# Patient Record
Sex: MALE | Race: WHITE | NOT HISPANIC OR LATINO | Employment: OTHER | URBAN - METROPOLITAN AREA
[De-identification: names, ages, dates, MRNs, and addresses within clinical notes are randomized per-mention and may not be internally consistent; named-entity substitution may affect disease eponyms.]

---

## 2019-09-10 ENCOUNTER — APPOINTMENT (OUTPATIENT)
Dept: RADIOLOGY | Facility: CLINIC | Age: 64
End: 2019-09-10
Payer: OTHER GOVERNMENT

## 2019-09-10 ENCOUNTER — OFFICE VISIT (OUTPATIENT)
Dept: OBGYN CLINIC | Facility: CLINIC | Age: 64
End: 2019-09-10
Payer: COMMERCIAL

## 2019-09-10 VITALS
BODY MASS INDEX: 34.57 KG/M2 | HEART RATE: 80 BPM | HEIGHT: 75 IN | DIASTOLIC BLOOD PRESSURE: 82 MMHG | SYSTOLIC BLOOD PRESSURE: 124 MMHG | WEIGHT: 278 LBS

## 2019-09-10 DIAGNOSIS — M54.2 NECK PAIN: ICD-10-CM

## 2019-09-10 DIAGNOSIS — M51.36 LUMBAR DEGENERATIVE DISC DISEASE: ICD-10-CM

## 2019-09-10 DIAGNOSIS — M46.1 INFLAMMATION OF RIGHT SACROILIAC JOINT (HCC): ICD-10-CM

## 2019-09-10 DIAGNOSIS — M47.812 CERVICAL SPONDYLOSIS WITHOUT MYELOPATHY: Primary | ICD-10-CM

## 2019-09-10 DIAGNOSIS — M54.50 LUMBAR PAIN: ICD-10-CM

## 2019-09-10 PROCEDURE — 72110 X-RAY EXAM L-2 SPINE 4/>VWS: CPT

## 2019-09-10 PROCEDURE — 72050 X-RAY EXAM NECK SPINE 4/5VWS: CPT

## 2019-09-10 PROCEDURE — 99203 OFFICE O/P NEW LOW 30 MIN: CPT | Performed by: ORTHOPAEDIC SURGERY

## 2019-09-10 RX ORDER — NAPROXEN 500 MG/1
500 TABLET ORAL 2 TIMES DAILY WITH MEALS
COMMUNITY
End: 2020-01-16 | Stop reason: SDUPTHER

## 2019-09-10 RX ORDER — CYCLOBENZAPRINE HCL 10 MG
10 TABLET ORAL
COMMUNITY

## 2019-09-10 RX ORDER — DONEPEZIL HYDROCHLORIDE 10 MG/1
10 TABLET, FILM COATED ORAL
COMMUNITY

## 2019-09-10 NOTE — PROGRESS NOTES
Assessment:     1  Cervical spondylosis without myelopathy    2  Lumbar degenerative disc disease    3  Inflammation of right sacroiliac joint (HCC)        Plan:     Problem List Items Addressed This Visit        Musculoskeletal and Integument    Cervical spondylosis without myelopathy - Primary    Relevant Orders    XR spine cervical complete 4 or 5 vw non injury    Ambulatory referral to Physical Therapy    Lumbar degenerative disc disease    Relevant Orders    XR spine lumbar minimum 4 views non injury    Ambulatory referral to Physical Therapy    Inflammation of right sacroiliac joint [facility])    Relevant Orders    Ambulatory referral to Physical Therapy          The patient was seen and examined by Dr Mily Harris and myself  Findings consistent with cervical and lumbar spine spondylosis and right sacroiliitis  Findings and treatment options were discussed with the patient  X-rays reviewed with him  Recommend formal physical therapy for the cervical lumbar spine at this time  Continue naproxen and Flexeril  He will follow up in 6-8 weeks for re-evaluation  Discussed he may knee evaluation with pain management for possible right SI joint injection if there is no improvement  All questions were answered to patient's satisfaction  Subjective:     Patient ID: Valery Menendez is a 60 y o  male  Chief Complaint: This is a 31-year-old white male complaining of cervical and lumbar spine pain for the past 40 years or so  Patient states the pain has been progressively worsening over time  He is having increasing stiffness of his cervical spine  He has occasional pain that radiates down his left upper extremity  He denies any numbness or tingling into his hands  He also has occasional pain radiating from his lumbar spine to his bilateral buttocks  He denies any numbness or tingling down his legs  He denies any bowel or bladder incontinence    He had physical therapy about 5 years ago in [place] that did help at that time  He states he has seen a rheumatologist and tests were negative for rheumatoid arthritis  He receives naproxen and Flexeril from his PCP that provided minimal relief  No other recent treatment  He has had a right total knee replacement and right shoulder replacement within the past 5 years as well  Patient intake form was reviewed today  Allergy:  No Known Allergies  Medications:  all current active meds have been reviewed  Past Medical History:  Past Medical History:   Diagnosis Date    Alzheimer disease     Arthritis      Past Surgical History:  Past Surgical History:   Procedure Laterality Date    CORONARY ANGIOPLASTY WITH STENT PLACEMENT Left     KNEE SURGERY Right 05/2017    replacement    SHOULDER SURGERY Right 11/2017    replacement     Family History:  Family History   Problem Relation Age of Onset    Colon cancer Mother     Dementia Mother     Prostate cancer Father     Heart disease Father      Social History:  Social History     Substance and Sexual Activity   Alcohol Use Yes    Binge frequency: Monthly     Social History     Substance and Sexual Activity   Drug Use Not on file     Social History     Tobacco Use   Smoking Status Former Smoker   Smokeless Tobacco Never Used     Review of Systems   Constitutional: Negative  HENT: Negative  Eyes: Negative  Respiratory: Negative  Cardiovascular: Negative  Gastrointestinal: Negative  Endocrine: Negative  Genitourinary: Negative  Musculoskeletal: Positive for arthralgias and back pain  Skin: Negative  Allergic/Immunologic: Negative  Neurological: Negative  Hematological: Negative  Psychiatric/Behavioral: Negative  Objective:  BP Readings from Last 1 Encounters:   09/10/19 124/82      Wt Readings from Last 1 Encounters:   09/10/19 126 kg (278 lb)      BMI:   Estimated body mass index is 34 75 kg/m² as calculated from the following:    Height as of this encounter: 6' 3" (1 905 m)  Weight as of this encounter: 126 kg (278 lb)  BSA:   Estimated body surface area is 2 52 meters squared as calculated from the following:    Height as of this encounter: 6' 3" (1 905 m)  Weight as of this encounter: 126 kg (278 lb)  Physical Exam   Constitutional: He is oriented to person, place, and time  He appears well-developed  HENT:   Head: Normocephalic and atraumatic  Eyes: Conjunctivae and EOM are normal    Neck: Neck supple  Neurological: He is alert and oriented to person, place, and time  Skin: Skin is warm  Psychiatric: He has a normal mood and affect  Nursing note and vitals reviewed  Back Exam     Tenderness   The patient is experiencing tenderness in the cervical and lumbar (Bilateral cervical paraspinals and trapezius, right SI joint and bilateral lumbar paraspinals)  Muscle Strength   The patient has normal back strength  Tests   Straight leg raise right: negative  Straight leg raise left: negative    Other   Toe walk: normal  Heel walk: normal  Back sensation: Some decreased sensation to light touch around right knee joint near total knee scar  Erythema: no back redness  Scars: absent    Comments:  Limited extension and bilateral lateral rotation of cervical spine with spasm  Limited extension of lumbar spine            I have personally reviewed pertinent films in PACS and my interpretation is X-rays of cervical spine reveal reversal of curve with degenerative disc disease at C5-C6, C6-C7 and facet arthropathy  X-rays lumbar spine reveal diffuse degenerative disc disease with straightening of curve

## 2019-09-24 ENCOUNTER — EVALUATION (OUTPATIENT)
Dept: PHYSICAL THERAPY | Facility: CLINIC | Age: 64
End: 2019-09-24
Payer: OTHER GOVERNMENT

## 2019-09-24 DIAGNOSIS — M47.812 CERVICAL SPONDYLOSIS WITHOUT MYELOPATHY: ICD-10-CM

## 2019-09-24 DIAGNOSIS — M51.36 LUMBAR DEGENERATIVE DISC DISEASE: ICD-10-CM

## 2019-09-24 DIAGNOSIS — M46.1 INFLAMMATION OF RIGHT SACROILIAC JOINT (HCC): ICD-10-CM

## 2019-09-24 PROCEDURE — 97161 PT EVAL LOW COMPLEX 20 MIN: CPT | Performed by: PHYSICAL THERAPIST

## 2019-09-24 PROCEDURE — 97140 MANUAL THERAPY 1/> REGIONS: CPT | Performed by: PHYSICAL THERAPIST
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
## 2019-09-27 ENCOUNTER — OFFICE VISIT (OUTPATIENT)
Dept: PHYSICAL THERAPY | Facility: CLINIC | Age: 64
End: 2019-09-27
Payer: OTHER GOVERNMENT

## 2019-09-27 DIAGNOSIS — M46.1 INFLAMMATION OF RIGHT SACROILIAC JOINT (HCC): ICD-10-CM

## 2019-09-27 DIAGNOSIS — M51.36 LUMBAR DEGENERATIVE DISC DISEASE: ICD-10-CM

## 2019-09-27 DIAGNOSIS — M47.812 CERVICAL SPONDYLOSIS WITHOUT MYELOPATHY: Primary | ICD-10-CM

## 2019-09-27 PROCEDURE — 97110 THERAPEUTIC EXERCISES: CPT

## 2019-09-27 PROCEDURE — 97112 NEUROMUSCULAR REEDUCATION: CPT

## 2019-09-27 PROCEDURE — 97140 MANUAL THERAPY 1/> REGIONS: CPT

## 2019-09-27 NOTE — PROGRESS NOTES
Daily Note     Today's date: 2019  Patient name: Daysi Major  : 1955  MRN: [id number]  Referring provider: Noah Freire PA-C  Dx:   Encounter Diagnosis     ICD-10-CM    1  Cervical spondylosis without myelopathy M47 812    2  Lumbar degenerative disc disease M51 36    3  Inflammation of right sacroiliac joint (HCC) M46 1                   Subjective: pt states that he feels the same only exercise that irritates him is chin tuck      Objective: See treatment diary below      Assessment:   Patient tolerated exercises with small correction with chin tuck to not press as aggressively into tuck, pt had less pain with less pressure      Plan: Continue per plan of care  Precautions: chronic c/s, and l/s pain, hx or right TSa, and right knee replacement              Daily Treatment Diary       Manuals            T/s, c/s and l/s pa and rot mobs DB                                                   Exercise Diary              HEP DB            UBE  2'x2'           Chin tuck  5"x10           doorway stretch             Seated t/s ext stretch             Supine QL  20"x5           ppt  5"  2x10           Bridge with  band  Blue  3"  2x10           clams  Blue  3"  2x10                                                                                                                                                                                    Modalities

## 2019-10-14 ENCOUNTER — OFFICE VISIT (OUTPATIENT)
Dept: PHYSICAL THERAPY | Facility: CLINIC | Age: 64
End: 2019-10-14
Payer: OTHER GOVERNMENT

## 2019-10-14 DIAGNOSIS — M51.36 LUMBAR DEGENERATIVE DISC DISEASE: ICD-10-CM

## 2019-10-14 DIAGNOSIS — M46.1 INFLAMMATION OF RIGHT SACROILIAC JOINT (HCC): ICD-10-CM

## 2019-10-14 DIAGNOSIS — M47.812 CERVICAL SPONDYLOSIS WITHOUT MYELOPATHY: Primary | ICD-10-CM

## 2019-10-14 PROCEDURE — 97140 MANUAL THERAPY 1/> REGIONS: CPT | Performed by: PHYSICAL THERAPIST

## 2019-10-14 PROCEDURE — 97110 THERAPEUTIC EXERCISES: CPT | Performed by: PHYSICAL THERAPIST

## 2019-10-14 PROCEDURE — 97112 NEUROMUSCULAR REEDUCATION: CPT | Performed by: PHYSICAL THERAPIST

## 2019-10-14 NOTE — PROGRESS NOTES
Daily Note     Today's date: 10/14/2019  Patient name: Claudeen Jacobsen  : 1955  MRN: [id number]  Referring provider: Andrew Barrow PA-C  Dx:   Encounter Diagnosis     ICD-10-CM    1  Cervical spondylosis without myelopathy M47 812    2  Lumbar degenerative disc disease M51 36    3  Inflammation of right sacroiliac joint (HCC) M46 1                   Subjective: Pt notes that as long as he is moving he isn't bad, but if he sits still he starts to get stiff and painful    Objective: See treatment diary below      Assessment: Pt with decreased TTp to the l/s and t/s during mobs today  Pt with increased diffiuclty and control of LT during prone I's    Plan: Continue per plan of care  Precautions: chronic c/s, and l/s pain, hx or right TSa, and right knee replacement              Daily Treatment Diary       Manuals 9/24 9/27 10/14          T/s, c/s and l/s pa and rot mobs DB  DB          SOR release with gentle traction   DB                                    Exercise Diary              HEP DB            UBE  2'x2' 2'x2'          Chin tuck  5"x10 5''x2x10          doorway stretch             Seated t/s ext stretch             Supine QL  20"x5 20''x5          ppt  5"  2x10 5''x2x10          Bridge with  band  Blue  3"  2x10 Blue 3''x2x10          clams  Blue  3"  2x10 Blue 3''x2x10          Prone hip ext   x10 ea          Prone I's   2x10          Seated isoshld ER   5''x2x10                                                                                                                                            Modalities

## 2019-10-16 ENCOUNTER — OFFICE VISIT (OUTPATIENT)
Dept: PHYSICAL THERAPY | Facility: CLINIC | Age: 64
End: 2019-10-16
Payer: OTHER GOVERNMENT

## 2019-10-16 DIAGNOSIS — M46.1 INFLAMMATION OF RIGHT SACROILIAC JOINT (HCC): ICD-10-CM

## 2019-10-16 DIAGNOSIS — M51.36 LUMBAR DEGENERATIVE DISC DISEASE: ICD-10-CM

## 2019-10-16 DIAGNOSIS — M47.812 CERVICAL SPONDYLOSIS WITHOUT MYELOPATHY: Primary | ICD-10-CM

## 2019-10-16 PROCEDURE — 97112 NEUROMUSCULAR REEDUCATION: CPT

## 2019-10-16 PROCEDURE — 97140 MANUAL THERAPY 1/> REGIONS: CPT

## 2019-10-16 NOTE — PROGRESS NOTES
Daily Note     Today's date: 10/16/2019  Patient name: Jeffy Ramsey  : 1955  MRN: [id number]  Referring provider: La Morris PA-C  Dx:   Encounter Diagnosis     ICD-10-CM    1  Cervical spondylosis without myelopathy M47 812    2  Lumbar degenerative disc disease M51 36    3  Inflammation of right sacroiliac joint (HCC) M46 1                   Subjective: no new c/o offered  He notes that chin tucks are still challenging for him      Objective: See treatment diary below      Assessment:   Patient noted tenderness with mobs through lower c/s upper t/s region  He is challenged by I due to some shoulder soreness      Plan: Continue per plan of care  Precautions: chronic c/s, and l/s pain, hx or right TSa, and right knee replacement              Daily Treatment Diary       Manuals 9/24 9/27 10/14 10/16         T/s, c/s and l/s pa and rot mobs DB  DB RN-  grade1-2         SOR release with gentle traction   DB DL                                   Exercise Diary              HEP DB            UBE  2'x2' 2'x2'          Chin tuck  5"x10 5''x2x10 5"x10         doorway stretch             Seated t/s ext stretch    3"  x10         Supine QL  20"x5 20''x5 20"x5         ppt  5"  2x10 5''x2x10 5"  2x10         Bridge with  band  Blue  3"  2x10 Blue 3''x2x10 Blue 3"  2x10         clams  Blue  3"  2x10 Blue 3''x2x10 Blue  3"  2x10         Prone hip ext   x10 ea 2x10         Prone I's   2x10 2x10         Seated isoshld ER   5''x2x10 5"  2x10                                                                                                                                           Modalities

## 2019-10-21 ENCOUNTER — OFFICE VISIT (OUTPATIENT)
Dept: PHYSICAL THERAPY | Facility: CLINIC | Age: 64
End: 2019-10-21
Payer: OTHER GOVERNMENT

## 2019-10-21 DIAGNOSIS — M46.1 INFLAMMATION OF RIGHT SACROILIAC JOINT (HCC): ICD-10-CM

## 2019-10-21 DIAGNOSIS — M47.812 CERVICAL SPONDYLOSIS WITHOUT MYELOPATHY: Primary | ICD-10-CM

## 2019-10-21 DIAGNOSIS — M51.36 LUMBAR DEGENERATIVE DISC DISEASE: ICD-10-CM

## 2019-10-21 PROCEDURE — 97012 MECHANICAL TRACTION THERAPY: CPT | Performed by: PHYSICAL THERAPIST

## 2019-10-21 PROCEDURE — 97140 MANUAL THERAPY 1/> REGIONS: CPT | Performed by: PHYSICAL THERAPIST

## 2019-10-21 PROCEDURE — 97112 NEUROMUSCULAR REEDUCATION: CPT | Performed by: PHYSICAL THERAPIST

## 2019-10-21 NOTE — PROGRESS NOTES
Daily Note     Today's date: 10/21/2019  Patient name: Etienne Babin  : 1955  MRN: [id number]  Referring provider: Maria Luisa Lang PA-C  Dx:   Encounter Diagnosis     ICD-10-CM    1  Cervical spondylosis without myelopathy M47 812    2  Lumbar degenerative disc disease M51 36    3  Inflammation of right sacroiliac joint (HCC) M46 1                   Subjective: Pt notes that he continues to have some pain on the left lateral side of c/s with chin tucks despite consistently doing them for 3 days  Objective: See treatment diary below      Assessment: trialed c/s traction today at 30lbs intermittant  initially trialed 20 # but pt didn't feel any pull  Pt with improved tolerance to rotation post traction  And improvement to chin tucks post left sided scalene stm and 1 st rib mobs  Add scalene stretch for nv    Plan: Continue per plan of care  Precautions: chronic c/s, and l/s pain, hx or right TSa, and right knee replacement              Daily Treatment Diary       Manuals 9/24 9/27 10/14 10/16 10/21        T/s, c/s and l/s pa and rot mobs DB  DB RN-  grade1-2 DB grade 2-3        SOR release with gentle traction   DB DL DB                                  Exercise Diary              HEP DB            UBE  2'x2' 2'x2'  2'x2'        Chin tuck  5"x10 5''x2x10 5"x10 5''x30        doorway stretch             Seated t/s ext stretch    3"  x10 3''x10        Supine QL  20"x5 20''x5 20"x5 20''x5        ppt  5"  2x10 5''x2x10 5"  2x10 5''x2x10        Bridge with  band  Blue  3"  2x10 Blue 3''x2x10 Blue 3"  2x10 nv        clams  Blue  3"  2x10 Blue 3''x2x10 Blue  3"  2x10 nv        Prone hip ext   x10 ea 2x10 2x10        Prone I's   2x10 2x10 2x10        Seated isoshld ER   5''x2x10 5"  2x10 nv        Scalene stretch     nv                     traction     intermittant 20# 2' 30# 10' (30# full time)                                                                                                  Modalities

## 2019-10-23 ENCOUNTER — OFFICE VISIT (OUTPATIENT)
Dept: PHYSICAL THERAPY | Facility: CLINIC | Age: 64
End: 2019-10-23
Payer: OTHER GOVERNMENT

## 2019-10-23 DIAGNOSIS — M46.1 INFLAMMATION OF RIGHT SACROILIAC JOINT (HCC): ICD-10-CM

## 2019-10-23 DIAGNOSIS — M51.36 LUMBAR DEGENERATIVE DISC DISEASE: ICD-10-CM

## 2019-10-23 DIAGNOSIS — M47.812 CERVICAL SPONDYLOSIS WITHOUT MYELOPATHY: Primary | ICD-10-CM

## 2019-10-23 PROCEDURE — 97012 MECHANICAL TRACTION THERAPY: CPT

## 2019-10-23 PROCEDURE — 97112 NEUROMUSCULAR REEDUCATION: CPT

## 2019-10-23 PROCEDURE — 97140 MANUAL THERAPY 1/> REGIONS: CPT

## 2019-10-23 NOTE — PROGRESS NOTES
Daily Note     Today's date: 10/23/2019  Patient name: John Duarte  : 1955  MRN: [id number]  Referring provider: Med Natarajan PA-C  Dx:   Encounter Diagnosis     ICD-10-CM    1  Cervical spondylosis without myelopathy M47 812    2  Lumbar degenerative disc disease M51 36    3  Inflammation of right sacroiliac joint (HCC) M46 1                   Subjective: pt notes that his neck felt better after last visit but low back was a bit more sore      Objective: See treatment diary below      Assessment: Tolerated treatment with no increased sxs during treatment session  Patient exhibited good technique with therapeutic exercises and would benefit from continued PT      Plan: Continue per plan of care  Precautions: chronic c/s, and l/s pain, hx or right TSa, and right knee replacement              Daily Treatment Diary       Manuals 9/24 9/27 10/14 10/16 10/21 10/23       T/s, c/s and l/s pa and rot mobs DB  DB RN-  grade1-2 DB grade 2-3 RN       SOR release with gentle traction   DB DL DB DL SOR only       TPR to L UT/scalene                          Exercise Diary              HEP DB            UBE  2'x2' 2'x2'  2'x2' 2'x2'       Chin tuck  5"x10 5''x2x10 5"x10 5''x30        doorway stretch             Seated t/s ext stretch    3"  x10 3''x10        Supine QL  20"x5 20''x5 20"x5 20''x5 20"X5       ppt  5"  2x10 5''x2x10 5"  2x10 5''x2x10 5"  2X10       Bridge with  band  Blue  3"  2x10 Blue 3''x2x10 Blue 3"  2x10 nv        clams  Blue  3"  2x10 Blue 3''x2x10 Blue  3"  2x10 nv        Prone hip ext   x10 ea 2x10 2x10 2X10       Prone I's   2x10 2x10 2x10        Seated isoshld ER   5''x2x10 5"  2x10 nv        Scalene stretch     nv                     traction     intermittant 20# 2' 30# 10' (30# static 10' 3 step up/down                                                                                                   Modalities

## 2019-10-28 ENCOUNTER — OFFICE VISIT (OUTPATIENT)
Dept: PHYSICAL THERAPY | Facility: CLINIC | Age: 64
End: 2019-10-28
Payer: OTHER GOVERNMENT

## 2019-10-28 DIAGNOSIS — M47.812 CERVICAL SPONDYLOSIS WITHOUT MYELOPATHY: Primary | ICD-10-CM

## 2019-10-28 DIAGNOSIS — M51.36 LUMBAR DEGENERATIVE DISC DISEASE: ICD-10-CM

## 2019-10-28 DIAGNOSIS — M46.1 INFLAMMATION OF RIGHT SACROILIAC JOINT (HCC): ICD-10-CM

## 2019-10-28 PROCEDURE — 97012 MECHANICAL TRACTION THERAPY: CPT | Performed by: PHYSICAL THERAPIST

## 2019-10-28 PROCEDURE — 97140 MANUAL THERAPY 1/> REGIONS: CPT | Performed by: PHYSICAL THERAPIST

## 2019-10-28 PROCEDURE — 97112 NEUROMUSCULAR REEDUCATION: CPT | Performed by: PHYSICAL THERAPIST

## 2019-10-28 NOTE — PROGRESS NOTES
Daily Note     Today's date: 10/28/2019  Patient name: Marko Chadwick  : 1955  MRN: [id number]  Referring provider: Lala Chahal PA-C  Dx:   Encounter Diagnosis     ICD-10-CM    1  Cervical spondylosis without myelopathy M47 812    2  Lumbar degenerative disc disease M51 36    3  Inflammation of right sacroiliac joint (HCC) M46 1                   Subjective: pt notes that he has been waking with less pain but by a few hours after waking he has more pain, and increased difficulty by the evening  Objective: See treatment diary below      Assessment: Pt presents with improved mobility of the t/s and l/s but with continued restrictions in the c/s    Plan: Continue per plan of care  Precautions: chronic c/s, and l/s pain, hx or right TSa, and right knee replacement              Daily Treatment Diary       Manuals 9/24 9/27 10/14 10/16 10/21 10/23 10/28      T/s, c/s and l/s pa and rot mobs DB  DB RN-  grade1-2 DB grade 2-3 RN DB      SOR release with gentle traction   DB DL DB DL SOR only DB      TPR to L UT/scalene                          Exercise Diary              HEP DB            UBE  2'x2' 2'x2'  2'x2' 2'x2' 2'x2'      Chin tuck  5"x10 5''x2x10 5"x10 5''x30  5''x30      doorway stretch             Seated t/s ext stretch    3"  x10 3''x10        Supine QL  20"x5 20''x5 20"x5 20''x5 20"X5 20''x5      ppt  5"  2x10 5''x2x10 5"  2x10 5''x2x10 5"  2X10 5''x2x10      Bridge with  band  Blue  3"  2x10 Blue 3''x2x10 Blue 3"  2x10 nv        clams  Blue  3"  2x10 Blue 3''x2x10 Blue  3"  2x10 nv  Blue 3''x2x10      Prone hip ext   x10 ea 2x10 2x10 2X10 nv      Prone I's   2x10 2x10 2x10        Seated isoshld ER   5''x2x10 5"  2x10 nv        [place] stretch     nv        [place] and arrow stretch       5''x10ea      traction     intermittant 20# 2' 30# 10' (30# static 10' 3 step up/down  (30# static 10' 3 step up/down Modalities

## 2019-10-30 ENCOUNTER — OFFICE VISIT (OUTPATIENT)
Dept: PHYSICAL THERAPY | Facility: CLINIC | Age: 64
End: 2019-10-30
Payer: OTHER GOVERNMENT

## 2019-10-30 DIAGNOSIS — M51.36 LUMBAR DEGENERATIVE DISC DISEASE: ICD-10-CM

## 2019-10-30 DIAGNOSIS — M46.1 INFLAMMATION OF RIGHT SACROILIAC JOINT (HCC): ICD-10-CM

## 2019-10-30 DIAGNOSIS — M47.812 CERVICAL SPONDYLOSIS WITHOUT MYELOPATHY: Primary | ICD-10-CM

## 2019-10-30 PROCEDURE — 97112 NEUROMUSCULAR REEDUCATION: CPT

## 2019-10-30 PROCEDURE — 97012 MECHANICAL TRACTION THERAPY: CPT

## 2019-10-30 PROCEDURE — 97140 MANUAL THERAPY 1/> REGIONS: CPT

## 2019-10-30 NOTE — PROGRESS NOTES
Daily Note     Today's date: 10/30/2019  Patient name: Andrés Bowens  : 1955  MRN: [id number]  Referring provider: Eleni Epley, PA-C  Dx:   Encounter Diagnosis     ICD-10-CM    1  Cervical spondylosis without myelopathy M47 812    2  Lumbar degenerative disc disease M51 36    3  Inflammation of right sacroiliac joint (HCC) M46 1                   Subjective: Patient states he has no new complaints at this time  Reports therapy has been improving his sx's slowly  Objective: See treatment diary below      Assessment: Tolerated treatment well  Continued with current POC this visit  Complained of pain in left cervical region following chin tucks which was relieved with SOR  Noted relief following traction  Patient demonstrated fatigue post treatment, exhibited good technique with therapeutic exercises and would benefit from continued PT      Plan: Continue per plan of care  Progress treatment as tolerated  Precautions: chronic c/s, and l/s pain, hx or right TSa, and right knee replacement              Daily Treatment Diary       Manuals 9/24 9/27 10/14 10/16 10/21 10/23 10/28 10/30     T/s, c/s and l/s pa and cali mobs DB  DB RN-  grade1-2 DB grade 2-3 RN DB nv     SOR release with gentle traction   DB DL DB DL SOR only DB RA     TPR to L UT/scalene                          Exercise Diary              HEP DB            UBE  2'x2' 2'x2'  2'x2' 2'x2' 2'x2' 2'x2'     Chin tuck  5"x10 5''x2x10 5"x10 5''x30  5''x30 5"x30     doorway stretch             Seated t/s ext stretch    3"  x10 3''x10        Supine QL  20"x5 20''x5 20"x5 20''x5 20"X5 20''x5 20"x5     ppt  5"  2x10 5''x2x10 5"  2x10 5''x2x10 5"  2X10 5''x2x10 5"  2x10     Bridge with  band  Blue  3"  2x10 Blue 3''x2x10 Blue 3"  2x10 nv        clams  Blue  3"  2x10 Blue 3''x2x10 Blue  3"  2x10 nv  Blue 3''x2x10 Blue  3" 2x15     Prone hip ext   x10 ea 2x10 2x10 2X10 nv      Prone I's   2x10 2x10 2x10        Seated isoshld ER   5''x2x10 5"  2x10 nv [place] stretch     nv        [place] and arrow stretch       5''x10ea 5"x10ea     traction     intermittant 20# 2' 30# 10' (30# static 10' 3 step up/down  (30# static 10' 3 step up/down  30# static 10' 3 step up/down                                                                                                Modalities

## 2019-11-04 ENCOUNTER — APPOINTMENT (OUTPATIENT)
Dept: PHYSICAL THERAPY | Facility: CLINIC | Age: 64
End: 2019-11-04
Payer: OTHER GOVERNMENT

## 2019-11-05 ENCOUNTER — OFFICE VISIT (OUTPATIENT)
Dept: OBGYN CLINIC | Facility: CLINIC | Age: 64
End: 2019-11-05
Payer: COMMERCIAL

## 2019-11-05 ENCOUNTER — APPOINTMENT (OUTPATIENT)
Dept: RADIOLOGY | Facility: CLINIC | Age: 64
End: 2019-11-05
Payer: OTHER GOVERNMENT

## 2019-11-05 VITALS
HEART RATE: 82 BPM | BODY MASS INDEX: 33.07 KG/M2 | SYSTOLIC BLOOD PRESSURE: 120 MMHG | DIASTOLIC BLOOD PRESSURE: 80 MMHG | WEIGHT: 266 LBS | HEIGHT: 75 IN

## 2019-11-05 DIAGNOSIS — M47.812 CERVICAL SPONDYLOSIS WITHOUT MYELOPATHY: ICD-10-CM

## 2019-11-05 DIAGNOSIS — M19.012 OSTEOARTHRITIS OF GLENOHUMERAL JOINT, LEFT: ICD-10-CM

## 2019-11-05 DIAGNOSIS — M25.512 LEFT SHOULDER PAIN, UNSPECIFIED CHRONICITY: ICD-10-CM

## 2019-11-05 DIAGNOSIS — M75.42 SHOULDER IMPINGEMENT SYNDROME, LEFT: Primary | ICD-10-CM

## 2019-11-05 DIAGNOSIS — M46.1 INFLAMMATION OF RIGHT SACROILIAC JOINT (HCC): ICD-10-CM

## 2019-11-05 DIAGNOSIS — M51.36 LUMBAR DEGENERATIVE DISC DISEASE: ICD-10-CM

## 2019-11-05 PROCEDURE — 99213 OFFICE O/P EST LOW 20 MIN: CPT | Performed by: ORTHOPAEDIC SURGERY

## 2019-11-05 PROCEDURE — 73030 X-RAY EXAM OF SHOULDER: CPT

## 2019-11-05 PROCEDURE — 20610 DRAIN/INJ JOINT/BURSA W/O US: CPT | Performed by: ORTHOPAEDIC SURGERY

## 2019-11-05 RX ORDER — BETAMETHASONE SODIUM PHOSPHATE AND BETAMETHASONE ACETATE 3; 3 MG/ML; MG/ML
12 INJECTION, SUSPENSION INTRA-ARTICULAR; INTRALESIONAL; INTRAMUSCULAR; SOFT TISSUE
Status: COMPLETED | OUTPATIENT
Start: 2019-11-05 | End: 2019-11-05

## 2019-11-05 RX ORDER — LIDOCAINE HYDROCHLORIDE 10 MG/ML
4 INJECTION, SOLUTION EPIDURAL; INFILTRATION; INTRACAUDAL; PERINEURAL
Status: COMPLETED | OUTPATIENT
Start: 2019-11-05 | End: 2019-11-05

## 2019-11-05 RX ADMIN — LIDOCAINE HYDROCHLORIDE 4 ML: 10 INJECTION, SOLUTION EPIDURAL; INFILTRATION; INTRACAUDAL; PERINEURAL at 09:46

## 2019-11-05 RX ADMIN — LIDOCAINE HYDROCHLORIDE 4 ML: 10 INJECTION, SOLUTION EPIDURAL; INFILTRATION; INTRACAUDAL; PERINEURAL at 08:45

## 2019-11-05 RX ADMIN — BETAMETHASONE SODIUM PHOSPHATE AND BETAMETHASONE ACETATE 12 MG: 3; 3 INJECTION, SUSPENSION INTRA-ARTICULAR; INTRALESIONAL; INTRAMUSCULAR; SOFT TISSUE at 09:46

## 2019-11-05 RX ADMIN — BETAMETHASONE SODIUM PHOSPHATE AND BETAMETHASONE ACETATE 12 MG: 3; 3 INJECTION, SUSPENSION INTRA-ARTICULAR; INTRALESIONAL; INTRAMUSCULAR; SOFT TISSUE at 08:45

## 2019-11-05 NOTE — PROGRESS NOTES
Assessment:     1  Shoulder impingement syndrome, left    2  Osteoarthritis of glenohumeral joint, left    3  Cervical spondylosis without myelopathy    4  Lumbar degenerative disc disease    5  Inflammation of right sacroiliac joint ([address] )        Plan:     Problem List Items Addressed This Visit        Musculoskeletal and Integument    Cervical spondylosis without myelopathy    Relevant Orders    Durable Medical Equipment    Ambulatory referral to Physical Therapy    Lumbar degenerative disc disease    Relevant Orders    Ambulatory referral to Physical Therapy    Inflammation of right sacroiliac joint (HCC)    Osteoarthritis of glenohumeral joint, left    Relevant Medications    lidocaine (PF) (XYLOCAINE-MPF) 1 % injection 4 mL (Completed)    betamethasone acetate-betamethasone sodium phosphate (CELESTONE) injection 12 mg (Completed)    Other Relevant Orders    Large joint arthrocentesis: L glenohumeral (Completed)       Other    Shoulder impingement syndrome, left - Primary    Relevant Medications    lidocaine (PF) (XYLOCAINE-MPF) 1 % injection 4 mL (Completed)    betamethasone acetate-betamethasone sodium phosphate (CELESTONE) injection 12 mg (Completed)    Other Relevant Orders    XR shoulder 2+ vw left    Large joint arthrocentesis: L subacromial bursa (Completed)          Patient will continue with physical therapy for cervical and lumbar DDD, pain, traction, modalities and motion as long as beneficial, he was given script for manual traction unit, he finds traction gives relief of symptoms  Imaging of left shoulder shows narrowing of glenohumeral joint with degenerative changes, exam positive impingement and Warren, offered and accepted CSI which was beneficial in pain reduction and improvement in motion  Ice shoulder over next few days, activities to tolerance  Can repeat injection in 3-4 months if needed  Continue with naproxen and muscle relaxant    All patient's questions were answered to his satisfaction  This note is created using dictation transcription  It may contain typographical errors, grammatical errors, improperly dictated words, background noise and other errors  Subjective:     Patient ID: Deondre Posada is a 60 y o  male  Chief Complaint:  60 yr old male in for f/u regarding cervical DDD, spondylosis and lumbar, SI pain  He has been attending physical therapy for past 6 weeks  He continues to note stiffness/pain, decreased motion in cervical spine  He feels tracking offers short term relief of symptoms  He is taking naproxen and muscle relaxant  Lumbar symptoms unchanged from previous appt  He is also complaining of left shoulder pain, told he has advanced arthritis in left shoulder, decreased motion and function with overhead activities, mild weakness noted  Pain anterolateral shoulder  No new injury or trauma  Denies numbness or tingling in left extremity  Allergy:  No Known Allergies  Medications:  all current active meds have been reviewed  Past Medical History:  Past Medical History:   Diagnosis Date    [facility] disease [facility] [address]     Arthritis      Past Surgical History:  Past Surgical History:   Procedure Laterality Date    CORONARY ANGIOPLASTY WITH STENT PLACEMENT Left     KNEE SURGERY Right 05/2017    replacement    SHOULDER SURGERY Right 11/2017    replacement     Family History:  Family History   Problem Relation Age of Onset    Colon cancer Mother     Dementia Mother     Prostate cancer Father     Heart disease Father      Social History:  Social History     Substance and Sexual Activity   Alcohol Use Yes    Binge frequency: Monthly     Social History     Substance and Sexual Activity   Drug Use Not on file     Social History     Tobacco Use   Smoking Status Former Smoker   Smokeless Tobacco Never Used     Review of Systems   Constitutional: Negative for chills and fever  HENT: Negative for drooling and sneezing  Eyes: Negative for redness     Respiratory: Negative for cough, shortness of breath and wheezing  Cardiovascular: Negative  Gastrointestinal: Negative  Endocrine: Negative  Genitourinary: Negative  Musculoskeletal: Positive for arthralgias (Left shoulder and left knee), back pain, joint swelling (Left knee) and neck pain  Neurological: Negative  Psychiatric/Behavioral: The patient is not nervous/anxious  Objective:  BP Readings from Last 1 Encounters:   11/05/19 120/80      Wt Readings from Last 1 Encounters:   11/05/19 121 kg (266 lb)      BMI:   Estimated body mass index is 33 25 kg/m² as calculated from the following:    Height as of this encounter: 6' 3" (1 905 m)  Weight as of this encounter: 121 kg (266 lb)  BSA:   Estimated body surface area is 2 48 meters squared as calculated from the following:    Height as of this encounter: 6' 3" (1 905 m)  Weight as of this encounter: 121 kg (266 lb)  Physical Exam   Constitutional: He is oriented to person, place, and time  He appears well-developed and well-nourished  HENT:   Head: Normocephalic and atraumatic  Eyes: Conjunctivae and EOM are normal    Neck: Neck supple  Pulmonary/Chest: Effort normal    Neurological: He is alert and oriented to person, place, and time  He has normal reflexes  Skin: Skin is warm and dry  Psychiatric: He has a normal mood and affect  His behavior is normal  Judgment and thought content normal    Nursing note and vitals reviewed  Back Exam     Tenderness   The patient is experiencing tenderness in the cervical and lumbar  Range of Motion   Extension: abnormal   Flexion: abnormal     Tests   Straight leg raise right: negative  Straight leg raise left: negative    Other   Toe walk: normal  Heel walk: normal    Comments:  Decreased cervical motion in all planes with pain  Sensation intact bilateral upper extremities        Left Shoulder Exam     Tenderness   The patient is experiencing tenderness in the biceps tendon      Range of Motion   Active abduction: 140 (Pain)   Passive abduction: 160 (Pain)   Forward flexion: 150 (Pain)     Muscle Strength   Abduction: 5/5   Internal rotation: 5/5   External rotation: 5/5   Biceps: 5/5     Tests   Apprehension: negative  Warren test: positive  Cross arm: negative  Impingement: positive  Drop arm: negative    Other   Erythema: absent  Scars: absent  Sensation: normal  Pulse: present             I have personally reviewed pertinent films in PACS and my interpretation is moderate narrowing glenohumeral joint with degenerative changes, no osseus abnormalities         Large joint arthrocentesis: L subacromial bursa  Date/Time: 11/5/2019 8:45 AM  Consent given by: patient  Site marked: site marked  Timeout: Immediately prior to procedure a time out was called to verify the correct patient, procedure, equipment, support staff and site/side marked as required   Supporting Documentation  Indications: pain   Procedure Details  Location: shoulder - L subacromial bursa  Preparation: Patient was prepped and draped in the usual sterile fashion  Needle size: 22 G  Ultrasound guidance: no  Approach: posterolateral  Medications administered: 12 mg betamethasone acetate-betamethasone sodium phosphate 6 (3-3) mg/mL; 4 mL lidocaine (PF) 1 %    Patient tolerance: patient tolerated the procedure well with no immediate complications  Dressing:  Sterile dressing applied    Large joint arthrocentesis: L glenohumeral  Date/Time: 11/5/2019 9:46 AM  Consent given by: patient  Site marked: site marked  Timeout: Immediately prior to procedure a time out was called to verify the correct patient, procedure, equipment, support staff and site/side marked as required   Supporting Documentation  Indications: pain   Procedure Details  Location: shoulder - L glenohumeral  Preparation: Patient was prepped and draped in the usual sterile fashion  Needle size: 22 G  Ultrasound guidance: no  Approach: posterior  Medications administered: 4 mL lidocaine (PF) 1 %; 12 mg betamethasone acetate-betamethasone sodium phosphate 6 (3-3) mg/mL    Patient tolerance: patient tolerated the procedure well with no immediate complications  Dressing:  Sterile dressing applied            Scribe Attestation    I,:   Karen Bills am acting as a scribe while in the presence of the attending physician :        I,:   Johnny Busby MD personally performed the services described in this documentation    as scribed in my presence :

## 2019-11-06 ENCOUNTER — OFFICE VISIT (OUTPATIENT)
Dept: PHYSICAL THERAPY | Facility: CLINIC | Age: 64
End: 2019-11-06
Payer: OTHER GOVERNMENT

## 2019-11-06 DIAGNOSIS — M47.812 CERVICAL SPONDYLOSIS WITHOUT MYELOPATHY: Primary | ICD-10-CM

## 2019-11-06 DIAGNOSIS — M46.1 INFLAMMATION OF RIGHT SACROILIAC JOINT (HCC): ICD-10-CM

## 2019-11-06 DIAGNOSIS — M51.36 LUMBAR DEGENERATIVE DISC DISEASE: ICD-10-CM

## 2019-11-06 PROCEDURE — 97012 MECHANICAL TRACTION THERAPY: CPT

## 2019-11-06 PROCEDURE — 97140 MANUAL THERAPY 1/> REGIONS: CPT

## 2019-11-06 PROCEDURE — 97112 NEUROMUSCULAR REEDUCATION: CPT

## 2019-11-06 NOTE — PROGRESS NOTES
Daily Note     Today's date: 2019  Patient name: Laura Coats  : 1955  MRN: [id number]  Referring provider: Melania Alvarado PA-C  Dx:   Encounter Diagnosis     ICD-10-CM    1  Cervical spondylosis without myelopathy M47 812    2  Lumbar degenerative disc disease M51 36    3  Inflammation of right sacroiliac joint (HCC) M46 1                   Subjective: notes that he got a shot in his left shoulder that seems to be helping at this time  Got script for cervical traction will submit to  [facility] and find out what he can get  Still with much tightness L sided neck       Objective: See treatment diary below      Assessment: unable to perform chin tuck today as he got significant tightness and spasm  Patient does get some relief from traction to decrease tightness however today he noted as machine coming down he felt pressure on left sided neck that did cause some discomfort      Plan: Continue per plan of care  Precautions: chronic c/s, and l/s pain, hx or right TSa, and right knee replacement              Daily Treatment Diary       Manuals 9/24 9/27 10/14 10/16 10/21 10/23 10/28 10/30 11    T/s, c/s and l/s pa and rot mobs DB  DB RN-  grade1-2 DB grade 2-3 RN DB nv     SOR release with gentle traction   DB DL DB DL SOR only DB RA DL    TPR to L UT/scalene                          Exercise Diary              HEP DB            UBE  2'x2' 2'x2'  2'x2' 2'x2' 2'x2' 2'x2' 2'x2'    Chin tuck  5"x10 5''x2x10 5"x10 5''x30  5''x30 5"x30 Held    doorway stretch             Seated t/s ext stretch    3"  x10 3''x10        Supine QL  20"x5 20''x5 20"x5 20''x5 20"X5 20''x5 20"x5     ppt  5"  2x10 5''x2x10 5"  2x10 5''x2x10 5"  2X10 5''x2x10 5"  2x10 5"x20    Bridge with  band  Blue  3"  2x10 Blue 3''x2x10 Blue 3"  2x10 nv    Blue  3"  2x10    clams  Blue  3"  2x10 Blue 3''x2x10 Blue  3"  2x10 nv  Blue 3''x2x10 Blue  3" 2x15 Blue  2x15    Prone hip ext   x10 ea 2x10 2x10 2X10 nv      Prone I's   2x10 2x10 2x10 Seated isoshld ER   5''x2x10 5"  2x10 nv        [place] stretch     nv        [place] and arrow stretch       5''x10ea 5"x10ea     traction     intermittant 20# 2' 30# 10' (30# static 10' 3 step up/down  (30# static 10' 3 step up/down  30# static 10' 3 step up/down 30#  Static 10'  3step up/dn                                                                                               Modalities

## 2019-11-08 ENCOUNTER — OFFICE VISIT (OUTPATIENT)
Dept: PHYSICAL THERAPY | Facility: CLINIC | Age: 64
End: 2019-11-08
Payer: OTHER GOVERNMENT

## 2019-11-08 DIAGNOSIS — M46.1 INFLAMMATION OF RIGHT SACROILIAC JOINT (HCC): ICD-10-CM

## 2019-11-08 DIAGNOSIS — M51.36 LUMBAR DEGENERATIVE DISC DISEASE: ICD-10-CM

## 2019-11-08 DIAGNOSIS — M47.812 CERVICAL SPONDYLOSIS WITHOUT MYELOPATHY: Primary | ICD-10-CM

## 2019-11-08 PROCEDURE — 97112 NEUROMUSCULAR REEDUCATION: CPT

## 2019-11-08 PROCEDURE — 97140 MANUAL THERAPY 1/> REGIONS: CPT

## 2019-11-08 PROCEDURE — 97012 MECHANICAL TRACTION THERAPY: CPT

## 2019-11-08 NOTE — PROGRESS NOTES
Daily Note     Today's date: 2019  Patient name: Renetta Reyes  : 1955  MRN: [id number]  Referring provider: Abigail Estes PA-C  Dx:   Encounter Diagnosis     ICD-10-CM    1  Cervical spondylosis without myelopathy M47 812    2  Lumbar degenerative disc disease M51 36    3  Inflammation of right sacroiliac joint (HCC) M46 1                   Subjective: pt notes that he was pretty sore after last visit in left psm      Objective: See treatment diary below      Assessment: Tolerated treatment with decreased pain in neck and did perform traction with better results  Patient exhibited good technique with therapeutic exercises      Plan: Continue per plan of care  Precautions: chronic c/s, and l/s pain, hx or right TSa, and right knee replacement              Daily Treatment Diary       Manuals 9/24 9/27 10/14 10/16 10/21 10/23 10/28 10/30 11/6 11/8   T/s, c/s and l/s pa and rot mobs DB  DB RN-  grade1-2 DB grade 2-3 RN DB nv  RN   SOR release with gentle traction   DB DL DB DL SOR only DB RA DL DL   TPR to L UT/scalene                          Exercise Diary              HEP DB            UBE  2'x2' 2'x2'  2'x2' 2'x2' 2'x2' 2'x2' 2'x2' 2'x2'   Chin tuck  5"x10 5''x2x10 5"x10 5''x30  5''x30 5"x30 Held    doorway stretch             Seated t/s ext stretch    3"  x10 3''x10        Supine QL  20"x5 20''x5 20"x5 20''x5 20"X5 20''x5 20"x5     ppt  5"  2x10 5''x2x10 5"  2x10 5''x2x10 5"  2X10 5''x2x10 5"  2x10 5"x20 5"x20   Bridge with  band  Blue  3"  2x10 Blue 3''x2x10 Blue 3"  2x10 nv    Blue  3"  2x10 2x10   clams  Blue  3"  2x10 Blue 3''x2x10 Blue  3"  2x10 nv  Blue 3''x2x10 Blue  3" 2x15 Blue  2x15 Blue  2x15   Prone hip ext   x10 ea 2x10 2x10 2X10 nv      Prone I's   2x10 2x10 2x10        Seated isoshld ER   5''x2x10 5"  2x10 nv        [place] stretch     nv        [place] and arrow stretch       5''x10ea 5"x10ea  5"x10   traction     intermittant 20# 2' 30# 10' (30# static 10' 3 step up/down  (30# static 10' 3 step up/down  30# static 10' 3 step up/down 30#  Static 10'  3step up/dn                                                                                               Modalities

## 2019-11-11 ENCOUNTER — OFFICE VISIT (OUTPATIENT)
Dept: PHYSICAL THERAPY | Facility: CLINIC | Age: 64
End: 2019-11-11
Payer: OTHER GOVERNMENT

## 2019-11-11 DIAGNOSIS — M46.1 INFLAMMATION OF RIGHT SACROILIAC JOINT (HCC): ICD-10-CM

## 2019-11-11 DIAGNOSIS — M47.812 CERVICAL SPONDYLOSIS WITHOUT MYELOPATHY: Primary | ICD-10-CM

## 2019-11-11 DIAGNOSIS — M51.36 LUMBAR DEGENERATIVE DISC DISEASE: ICD-10-CM

## 2019-11-11 PROCEDURE — 97012 MECHANICAL TRACTION THERAPY: CPT

## 2019-11-11 PROCEDURE — 97140 MANUAL THERAPY 1/> REGIONS: CPT

## 2019-11-11 NOTE — PROGRESS NOTES
Daily Note     Today's date: 2019  Patient name: Rose Marie Garcia  : 1955  MRN: [id number]  Referring provider: Suzanne Bustos PA-C  Dx:   Encounter Diagnosis     ICD-10-CM    1  Cervical spondylosis without myelopathy M47 812    2  Lumbar degenerative disc disease M51 36    3  Inflammation of right sacroiliac joint (HCC) M46 1                   Subjective: pt notes that he felt pretty good after last visit with less pain in left sided neck  Objective: See treatment diary below      Assessment: Tolerated treatment with no increased sxs in neck   Patient with a bit less tightness noted in left sided neck (UT) with manual treatment       Plan: Continue per plan of care  Precautions: chronic c/s, and l/s pain, hx or right TSa, and right knee replacement              Daily Treatment Diary       Manuals    T/s, c/s and l/s kianna walker RN         RN   SOR release with gentle traction DL        DL DL   TPR to L UT/scalene                          Exercise Diary              HEP             UBE 2'x2'        2'x2' 2'x2'   Chin tuck         Held    doorway stretch             Seated t/s ext stretch             Supine QL             ppt 5"x20        5"x20 5"x20   Bridge with  band Blue  2x10        Blue  3"  2x10 2x10   clams Blue  2x15        Blue  2x15 Blue  2x15   Prone hip ext 2x10            Prone I's             Seated isoshld ER             [place] stretch             [place] and arrow stretch 5"x10         5"x10   traction 30# static 10' 3step        30#  Static 10'  3step up/dn                                                                                               Modalities

## 2019-11-21 ENCOUNTER — TELEPHONE (OUTPATIENT)
Dept: OBGYN CLINIC | Facility: HOSPITAL | Age: 64
End: 2019-11-21

## 2019-11-21 NOTE — TELEPHONE ENCOUNTER
I did not evaluate his knee  I just stated that abnormal gait due to knee or hip problem may cause pain in his back  He does have degenerative disc disease in the lumbar spine which could cause pain in itself

## 2019-11-21 NOTE — TELEPHONE ENCOUNTER
Patient is calling   [phone]    Patient is calling because he needs a letter written to the [place] explaining that his back pain could be caused by his knee problems  Patient is stating that this was discussed at his last appt  Patient would like to pick this up when we call him to let him know it is ready  Patient may provide a fax number as well

## 2019-11-21 NOTE — TELEPHONE ENCOUNTER
I spoke with patient and advised him above information  He is going to get a referral to come back for knee evaluation  He will call when he has the referral from the [address]

## 2019-11-29 ENCOUNTER — OFFICE VISIT (OUTPATIENT)
Dept: PHYSICAL THERAPY | Facility: CLINIC | Age: 64
End: 2019-11-29
Payer: OTHER GOVERNMENT

## 2019-11-29 DIAGNOSIS — M51.36 LUMBAR DEGENERATIVE DISC DISEASE: ICD-10-CM

## 2019-11-29 DIAGNOSIS — M47.812 CERVICAL SPONDYLOSIS WITHOUT MYELOPATHY: Primary | ICD-10-CM

## 2019-11-29 DIAGNOSIS — M46.1 INFLAMMATION OF RIGHT SACROILIAC JOINT (HCC): ICD-10-CM

## 2019-11-29 PROCEDURE — 97112 NEUROMUSCULAR REEDUCATION: CPT

## 2019-11-29 PROCEDURE — 97012 MECHANICAL TRACTION THERAPY: CPT

## 2019-11-29 PROCEDURE — 97140 MANUAL THERAPY 1/> REGIONS: CPT

## 2019-11-29 NOTE — PROGRESS NOTES
Daily Note     Today's date: 2019  Patient name: Renetta Reyes  : 1955  MRN: [id number]  Referring provider: Abigail Estes PA-C  Dx:   Encounter Diagnosis     ICD-10-CM    1  Cervical spondylosis without myelopathy M47 812    2  Lumbar degenerative disc disease M51 36    3  Inflammation of right sacroiliac joint (HCC) M46 1                   Subjective: "It's all the same, it's the arthritis  The neck stretching helps but it doesn't last  They really haven't been doing much for my shoulders and that's what bothers me " Pt does also c/o's cerv pain when driving, lumbar pain when bending over  Unable to specify numerical pain level at arrival        Objective: See treatment diary below      Assessment: Tolerated treatment well  Patient would benefit from continued PT  Reiterated importance of improving flexibility through cervical and lumbar spine before strengthening  Pt did have sharp L anterior shoulder pain with bow and arrow stretch; held this today  Responds well to manual therapy and traction for temporary relief of cervical pain  RE next week  Plan: Continue per plan of care  Precautions: chronic c/s, and l/s pain, hx or right TSa, and right knee replacement              Daily Treatment Diary       Manuals    T/s, c/s and l/s pa and cali walker RN RN        RN   SOR release with gentle traction DL ARLEY       DL DL   TPR to L UT/scalene                          Exercise Diary              HEP             UBE 2'x2' 2'x2'       2'x2' 2'x2'   Chin tuck         Held    doorway stretch             Seated t/s ext stretch             Supine QL             ppt 5"x20 5''x20       5"x20 5"x20   Bridge with  band Blue  2x10 Blue 2x10       Blue  3"  2x10 2x10   clams Blue  2x15 Blue 2x15        Blue  2x15 Blue  2x15   Prone hip ext 2x10 2x10            Prone I's             Seated isoshld ER             Scal[place] stretch             [place] and arrow stretch 5"x10 5''x10 R only, L p!         5"x10   traction 30# static 10' 3step 30# static 10' 3 step       30#  Static 10'  3step up/dn                                                                                               Modalities

## 2019-12-02 ENCOUNTER — EVALUATION (OUTPATIENT)
Dept: PHYSICAL THERAPY | Facility: CLINIC | Age: 64
End: 2019-12-02
Payer: COMMERCIAL

## 2019-12-02 DIAGNOSIS — M47.812 CERVICAL SPONDYLOSIS WITHOUT MYELOPATHY: Primary | ICD-10-CM

## 2019-12-02 DIAGNOSIS — M46.1 INFLAMMATION OF RIGHT SACROILIAC JOINT (HCC): ICD-10-CM

## 2019-12-02 DIAGNOSIS — M51.36 LUMBAR DEGENERATIVE DISC DISEASE: ICD-10-CM

## 2019-12-02 PROCEDURE — 97164 PT RE-EVAL EST PLAN CARE: CPT | Performed by: PHYSICAL THERAPIST

## 2019-12-02 PROCEDURE — 97140 MANUAL THERAPY 1/> REGIONS: CPT | Performed by: PHYSICAL THERAPIST

## 2019-12-02 PROCEDURE — 97112 NEUROMUSCULAR REEDUCATION: CPT | Performed by: PHYSICAL THERAPIST

## 2019-12-02 NOTE — PROGRESS NOTES
PT Evaluation     Today's date: 2019  Patient name: Andrés Bowens  : 1955  MRN: [id number]  Referring provider: Eleni Epley, PA-C  Dx:   Encounter Diagnosis     ICD-10-CM    1  Cervical spondylosis without myelopathy M47 812    2  Lumbar degenerative disc disease M51 36    3  Inflammation of right sacroiliac joint [facility]) M46 1                   Assessment  Assessment details: Andrés Bowens is a 60 y o  Male with a hx of dementia, and a hx of chronic pain  He has been compliant with attending PT and home exercise program since initial eval   Felipe Tiwari  has made improvements in objective data since initial eval but is still limited compared to prior level of function  Felipe Tiwari continues with above listed impairments and would benefit from additional skilled PT to address these deficits to return to prior level of function  Impairments: abnormal gait, abnormal muscle firing, abnormal or restricted ROM, activity intolerance, difficulty understanding, impaired physical strength, lacks appropriate home exercise program, pain with function, poor posture  and poor body mechanics  Understanding of Dx/Px/POC: good   Prognosis: good    Goals  Short Term Goals: Target Date 4 weeks  1  Pt will initiate and advance HEP  2  Pt will have full prom  Not met  3  Pt will be able to sit to stand with out UE use met  4  Pt will have <2/10 pain at rest met      Long Term Goals: Target Date 8 weeks  1  Pt will demonstrate independence in HEP  2  Pt will be able to squat with out pain not met  3  Pt will have at least 4/5 strength in B ue and le partially met  4   Pt will have <2/10 pain with activity not met      Plan  Patient would benefit from: skilled PT  Planned modality interventions: cryotherapy, electrical stimulation/Russian stimulation and thermotherapy: hydrocollator packs  Planned therapy interventions: joint mobilization, manual therapy, patient education, postural training, activity modification, abdominal trunk stabilization, body mechanics training, flexibility, functional ROM exercises, graded exercise, home exercise program, neuromuscular re-education, strengthening, stretching, therapeutic activities, therapeutic exercise, motor coordination training, muscle pump exercises, gait training, balance/weight bearing training, ADL training and breathing training  Frequency: 2x week  Duration in weeks: 6  Plan of Care beginning date: 2019  Plan of Care expiration date: 2019  Treatment plan discussed with: patient        Subjective Evaluation    History of Present Illness  Mechanism of injury: Pt notes that he has had some improvements in his strength and rom, but continues to have pain through out his body  He notes that the exercises do help him but when he wakes in the am he has pain with movement after a few minutes  Pain  Current pain ratin  At best pain ratin  At worst pain ratin  Location: l/s and c/s  Quality: dull ache, discomfort, radiating, tight, throbbing and sharp    Patient Goals  Patient goals for therapy: decreased pain, increased motion, independence with ADLs/IADLs, increased strength and return to sport/leisure activities          Objective     Concurrent Complaints  Positive for night pain and disturbed sleep  Negative for dizziness, faints, headaches, tinnitus, trouble swallowing, difficulty breathing, shortness of breath, respiratory pain, visual change, bladder dysfunction, bowel dysfunction, saddle (S4) numbness, history of cancer, history of trauma and infection    Static Posture     Head  Forward  Shoulders  Depressed and rounded  Thoracic Spine  Hyperkyphosis  Lumbar Spine   Flattened  Postural Observations  Seated posture: fair  Standing posture: fair  Correction of posture: has no consistent effect        Tenderness     Lumbar Spine  No tenderness in the spinous process       Neurological Testing     Sensation   Cervical/Thoracic   Left   Intact: light touch    Right   Intact: light touch    Lumbar   Left   Intact: light touch    Right   Intact: light touch    Reflexes   Left   Biceps (C5/C6): normal (2+)  Brachioradialis (C6): normal (2+)  Triceps (C7): normal (2+)  Patellar (L4): normal (2+)  Achilles (S1): normal (2+)    Right   Biceps (C5/C6): normal (2+)  Brachioradialis (C6): normal (2+)  Triceps (C7): normal (2+)  Patellar (L4): normal (2+)  Achilles (S1): normal (2+)    Active Range of Motion   Cervical/Thoracic Spine       Cervical    Subcranial retraction:  with pain   Restriction level: moderate  Flexion:  WFL  Extension:  with pain Restriction level: moderate  Left lateral flexion:  with pain Restriction level: minimal  Right lateral flexion:  with pain Restriction level minimal  Left rotation:  with pain Restriction level: moderate  Right rotation:  with pain Restriction level: moderate    Thoracic    Flexion:  Restriction level: minimal  Extension:  Restriction level: minimal    Lumbar   Flexion:  WFL  Extension:  Restriction level: minimal  Left lateral flexion:  Restriction level: minimal  Right lateral flexion:  Restriction level: minimal  Left rotation:  with pain Restriction level: minimal  Right rotation:  Restriction level: minimal    Joint Play     Hypomobile: C3, C4, C5, C6, C7, T1, T2, T3, T4, T5, T6, T7, T8, T9, T10, T11, T12, 6th rib, 7th rib, 8th rib, 9th rib, 10th rib, L1, L2, L3, L4 and L5     Strength/Myotome Testing   Cervical Spine   Neck flexion: 4    Left Shoulder     Planes of Motion   Flexion: 4   Abduction: 4   External rotation at 0°: 4   Internal rotation at 0°: 4-     Right Shoulder     Planes of Motion   Flexion: 4   Abduction: 4   External rotation at 0°: 4   Internal rotation at 0°: 4     Left Elbow   Flexion: 4  Extension: 4    Right Elbow   Flexion: 4    Left Wrist/Hand   Wrist extension: 4+  Wrist flexion: 4+    Right Wrist/Hand   Wrist extension: 4+  Wrist flexion: 4+    Left Hip   Planes of Motion   Flexion: 4  Extension: 4-  Abduction: 4-  Adduction: 4+    Right Hip   Planes of Motion   Flexion: 4  Extension: 4  Abduction: 4-  Adduction: 4    Left Knee   Flexion: 4+  Extension: 5    Right Knee   Flexion: 4+  Extension: 5    Left Ankle/Foot   Dorsiflexion: 4+  Inversion: 4  Great toe extension: 4+    Right Ankle/Foot   Dorsiflexion: 4+  Inversion: 4  Great toe extension: 4+    Muscle Activation   Patient able to activate left transverse abdominals, left external obliques, left internal obliques, right transverse abdominals, right external obliques and right internal obliques  Patient unable to activate left multifidus and right multifidus  Tests   Cervical   Positive lumbar distraction test and neck flexor muscle endurance test   Negative vertical compression  Left   Positive Spurling's Test A and cervical flexion-rotation test       Right   Positive Spurling's Test A  Lumbar   Negative prone instability  and vertical compression  Left   Negative passive SLR and slump test      Right   Negative passive SLR and slump test      Left Pelvic Girdle/Sacrum   Positive: active SLR test      Right Pelvic Girdle/Sacrum   Positive: active SLR test      Ambulation     Observational Gait   Decreased walking speed and stride length  Left arm swing: decreased  Right arm swing: decreased  Base of support: increased    Functional Assessment      Squat    Pain, left tibial anterior translation beyond toes and right tibial anterior translation beyond toes  Single Leg Stance   Left: 7 seconds  Right: 9 seconds  Neuro Exam:     Headaches   Patient reports headaches: No               Precautions: chronic c/s, and l/s pain, hx or right TSa, and right knee replacement              Daily Treatment Diary        Daily Treatment Diary       Manuals 11/11 11/29 12/2 11/6 11/8   T/s, c/s and l/s pa and clai reddys RN RN DB       RN   SOR release with gentle traction DL ARLEY DB      DL DL   TPR to L UT/tay Exercise Diary              HEP             UBE 2'x2' 2'x2' 2'x2'      2'x2' 2'x2'   Chin tuck         Held    doorway stretch             Seated t/s ext stretch             Supine QL             ppt 5"x20 5''x20       5"x20 5"x20   Bridge with  band Blue  2x10 Blue 2x10       Blue  3"  2x10 2x10   clams Blue  2x15 Blue 2x15        Blue  2x15 Blue  2x15   Prone hip ext 2x10 2x10            Prone I's             Seated shld ER B   green 2x10          Scalene stretch             [place] and arrow stretch 5"x10 5''x10 R only, L p!         5"x10   traction 30# static 10' 3step 30# static 10' 3 step       30#  Static 10'  3step up/dn    Seated knee ext   5# 2x10          Standing knee flex   5# 2x10          Seated snags   2x10 ea rot

## 2019-12-04 ENCOUNTER — OFFICE VISIT (OUTPATIENT)
Dept: PHYSICAL THERAPY | Facility: CLINIC | Age: 64
End: 2019-12-04
Payer: COMMERCIAL

## 2019-12-04 DIAGNOSIS — M47.812 CERVICAL SPONDYLOSIS WITHOUT MYELOPATHY: Primary | ICD-10-CM

## 2019-12-04 DIAGNOSIS — M46.1 INFLAMMATION OF RIGHT SACROILIAC JOINT (HCC): ICD-10-CM

## 2019-12-04 DIAGNOSIS — M51.36 LUMBAR DEGENERATIVE DISC DISEASE: ICD-10-CM

## 2019-12-04 PROCEDURE — 97110 THERAPEUTIC EXERCISES: CPT

## 2019-12-04 PROCEDURE — 97112 NEUROMUSCULAR REEDUCATION: CPT

## 2019-12-04 PROCEDURE — 97140 MANUAL THERAPY 1/> REGIONS: CPT

## 2019-12-04 NOTE — PROGRESS NOTES
Daily Note     Today's date: 2019  Patient name: Shelli Dunlap  : 1955  MRN: [id number]  Referring provider: Nemesio Ruiz PA-C  Dx:   Encounter Diagnosis     ICD-10-CM    1  Cervical spondylosis without myelopathy M47 812    2  Lumbar degenerative disc disease M51 36    3  Inflammation of right sacroiliac joint (HCC) M46 1                   Subjective: pt notes just not feeling well as he is battling a chest cold  Objective: See treatment diary below      Assessment:   Patient noted some clicking in left knee with LAQ exercises but no back pain with activity  Plan: Continue per plan of care  Precautions: chronic c/s, and l/s pain, hx or right TSa, and right knee replacement        Daily Treatment Diary       Manuals    T/s, c/s and l/s pa and rot mobs RN RN DB DB      RN   SOR release with gentle traction DL ARLEY DB DL     DL DL   TPR to L UT/scalene                          Exercise Diary              HEP             UBE 2'x2' 2'x2' 2'x2' 2'x2'     2'x2' 2'x2'   Chin tuck         Held    doorway stretch             Seated t/s ext stretch             Supine QL             ppt 5"x20 5''x20  5"x20     5"x20 5"x20   Bridge with  band Blue  2x10 Blue 2x10  Blue   2x10     Blue  3"  2x10 2x10   clams Blue  2x15 Blue 2x15   Blue   2x10     Blue  2x15 Blue  2x15   Prone hip ext 2x10 2x10   2x10         Prone I's             Seated shld ER B   green 2x10          Scalene stretch             [place] and arrow stretch 5"x10 5''x10 R only, L p!         5"x10   traction 30# static 10' 3step 30# static 10' 3 step       30#  Static 10'  3step up/dn    Seated knee ext   5# 2x10 5#  2x10         Standing knee flex   5# 2x10 5#  2x10         Seated snags   2x10 ea rot

## 2019-12-09 ENCOUNTER — OFFICE VISIT (OUTPATIENT)
Dept: PHYSICAL THERAPY | Facility: CLINIC | Age: 64
End: 2019-12-09
Payer: COMMERCIAL

## 2019-12-09 DIAGNOSIS — M46.1 INFLAMMATION OF RIGHT SACROILIAC JOINT (HCC): ICD-10-CM

## 2019-12-09 DIAGNOSIS — M51.36 LUMBAR DEGENERATIVE DISC DISEASE: ICD-10-CM

## 2019-12-09 DIAGNOSIS — M47.812 CERVICAL SPONDYLOSIS WITHOUT MYELOPATHY: Primary | ICD-10-CM

## 2019-12-09 PROCEDURE — 97140 MANUAL THERAPY 1/> REGIONS: CPT

## 2019-12-09 PROCEDURE — 97112 NEUROMUSCULAR REEDUCATION: CPT

## 2019-12-09 PROCEDURE — 97110 THERAPEUTIC EXERCISES: CPT

## 2019-12-09 NOTE — PROGRESS NOTES
Daily Note     Today's date: 2019  Patient name: Grisel Sanchez  : 1955  MRN: [id number]  Referring provider: Ling Hamilton PA-C  Dx:   Encounter Diagnosis     ICD-10-CM    1  Cervical spondylosis without myelopathy M47 812    2  Lumbar degenerative disc disease M51 36    3  Inflammation of right sacroiliac joint ([address] ) M46 1            2020- pt is following up with ortho and pain management  DC PT at this time  Subjective: pt notes that he is not feeling well, with sickness notes increased tightness in neck( a lot of coughing) but back is doing ok      Objective: See treatment diary below      Assessment: Tolerated treatment fair    Patient tolerated manuals all in prone as this was better for sickness vs supine      Plan: Continue per plan of care  Precautions: chronic c/s, and l/s pain, hx or right TSa, and right knee replacement        Daily Treatment Diary       Manuals    T/s, c/s and l/s pa and rot mobs RN RN DB DB DB     RN   SOR release with gentle traction DL ARLEY DB DL DL    DL DL   TPR to L UT/scalene                          Exercise Diary              HEP             UBE 2'x2' 2'x2' 2'x2' 2'x2' 2'x2'    2'x2' 2'x2'   Chin tuck         Held    doorway stretch             Seated t/s ext stretch             Supine QL             ppt 5"x20 5''x20  5"x20     5"x20 5"x20   Bridge with  band Blue  2x10 Blue 2x10  Blue   2x10 Blue 2x10    Blue  3"  2x10 2x10   clams Blue  2x15 Blue 2x15   Blue   2x10 Blue 2x10    Blue  2x15 Blue  2x15   Prone hip ext 2x10 2x10   2x10 2x10        Prone I's             Seated shld ER B   green 2x10          Scalene stretch             [place] and arrow stretch 5"x10 5''x10 R only, L p!    5"x10     5"x10   traction 30# static 10' 3step 30# static 10' 3 step       30#  Static 10'  3step up/dn    Seated knee ext   5# 2x10 5#  2x10 nv        Standing knee flex   5# 2x10 5#  2x10 nv        Seated snags   2x10 ea rot  nv

## 2019-12-11 ENCOUNTER — APPOINTMENT (OUTPATIENT)
Dept: PHYSICAL THERAPY | Facility: CLINIC | Age: 64
End: 2019-12-11
Payer: COMMERCIAL

## 2019-12-13 ENCOUNTER — OFFICE VISIT (OUTPATIENT)
Dept: OBGYN CLINIC | Facility: CLINIC | Age: 64
End: 2019-12-13
Payer: COMMERCIAL

## 2019-12-13 VITALS
BODY MASS INDEX: 33.07 KG/M2 | HEIGHT: 75 IN | DIASTOLIC BLOOD PRESSURE: 87 MMHG | HEART RATE: 74 BPM | WEIGHT: 266 LBS | SYSTOLIC BLOOD PRESSURE: 131 MMHG

## 2019-12-13 DIAGNOSIS — M19.012 OSTEOARTHRITIS OF GLENOHUMERAL JOINT, LEFT: ICD-10-CM

## 2019-12-13 DIAGNOSIS — M75.42 SHOULDER IMPINGEMENT SYNDROME, LEFT: ICD-10-CM

## 2019-12-13 DIAGNOSIS — M51.36 LUMBAR DEGENERATIVE DISC DISEASE: ICD-10-CM

## 2019-12-13 DIAGNOSIS — M47.812 CERVICAL SPONDYLOSIS WITHOUT MYELOPATHY: Primary | ICD-10-CM

## 2019-12-13 PROCEDURE — 99213 OFFICE O/P EST LOW 20 MIN: CPT | Performed by: ORTHOPAEDIC SURGERY

## 2019-12-13 NOTE — PROGRESS NOTES
Assessment:     1  Cervical spondylosis without myelopathy    2  Lumbar degenerative disc disease    3  Osteoarthritis of glenohumeral joint, left    4  Shoulder impingement syndrome, left        Plan:     Problem List Items Addressed This Visit        Musculoskeletal and Integument    Cervical spondylosis without myelopathy - Primary    Relevant Orders    Ambulatory referral to Pain Management    FL guided needle plac bx/asp/inj    Lumbar degenerative disc disease    Relevant Orders    Ambulatory referral to Pain Management    FL guided needle plac bx/asp/inj    Osteoarthritis of glenohumeral joint, left       Other    Shoulder impingement syndrome, left          Findings consistent with cervical spondylosis without myelopathy, lumbar degenerative disc disease, left shoulder osteoarthritis and impingement  Discussed findings and treatment options with the patient  I will refer patient to pain management doctor for cortisone injection over his cervical and lumbar spine  Hopefully the cervical injection will decreased pain over his shoulder  I encouraged patient to continue doing therapy for his neck and low back  May have to consider refer patient to a spine specialist for further treatment recommendation if his pain continues  All patient's questions were answered to his satisfaction  This note is created using dictation transcription  It may contain typographical errors, grammatical errors, improperly dictated words, background noise and other errors  Subjective:     Patient ID: Albino Meckel is a 60 y o  male  Chief Complaint:  72-year-old gentleman follow-up left shoulder pain, neck pain, and low back pain  Patient has been attending physical therapy for his lumbar and neck treatment  Patient stated the therapy he does help for couple hours but the pain returns  He continued to complain of pain in his neck, low back and left shoulder  He denies pain radiating down his arms and legs    He denies weakness in the arm and legs  The left shoulder pain is localized over the shoulder with reaching and overhead activities  He denies bowel bladder problem  Allergy:  No Known Allergies  Medications:  all current active meds have been reviewed  Past Medical History:  Past Medical History:   Diagnosis Date    Alzheimer disease ([address] )     Arthritis      Past Surgical History:  Past Surgical History:   Procedure Laterality Date    CORONARY ANGIOPLASTY WITH STENT PLACEMENT Left     KNEE SURGERY Right 05/2017    replacement    SHOULDER SURGERY Right 11/2017    replacement     Family History:  Family History   Problem Relation Age of Onset    Colon cancer Mother     Dementia Mother     Prostate cancer Father     Heart disease Father      Social History:  Social History     Substance and Sexual Activity   Alcohol Use Yes    Binge frequency: Monthly     Social History     Substance and Sexual Activity   Drug Use Not on file     Social History     Tobacco Use   Smoking Status Former Smoker   Smokeless Tobacco Never Used     Review of Systems   Constitutional: Negative  HENT: Negative  Eyes: Negative  Respiratory: Negative  Cardiovascular: Negative  Gastrointestinal: Negative  Endocrine: Negative  Genitourinary: Negative  Musculoskeletal: Positive for arthralgias (Left shoulder), back pain, neck pain and neck stiffness  Skin: Negative  Neurological: Negative  Negative for weakness and numbness  Hematological: Negative  Psychiatric/Behavioral: Negative  Objective:  BP Readings from Last 1 Encounters:   12/13/19 131/87      Wt Readings from Last 1 Encounters:   12/13/19 121 kg (266 lb)      BMI:   Estimated body mass index is 33 25 kg/m² as calculated from the following:    Height as of this encounter: 6' 3" (1 905 m)  Weight as of this encounter: 121 kg (266 lb)    BSA:   Estimated body surface area is 2 48 meters squared as calculated from the following:    Height as of this encounter: 6' 3" (1 905 m)  Weight as of this encounter: 121 kg (266 lb)  Physical Exam   Constitutional: He is oriented to person, place, and time  He appears well-developed  HENT:   Head: Normocephalic and atraumatic  Eyes: Conjunctivae and EOM are normal    Neck: Neck supple  Pulmonary/Chest: Effort normal    Neurological: He is alert and oriented to person, place, and time  Skin: Skin is warm  Psychiatric: He has a normal mood and affect  Nursing note and vitals reviewed  Back Exam     Tenderness   The patient is experiencing tenderness in the cervical     Tests   Straight leg raise right: negative  Straight leg raise left: negative    Other   Sensation: normal  Gait: normal     Comments:  Pain with neck range of motion in extension and rotation  Bilateral upper extremity motor and sensory function are grossly intact      Left Shoulder Exam     Tenderness   The patient is experiencing tenderness in the biceps tendon  Range of Motion   Active abduction: 150 (Pain)   Passive abduction: 160   Forward flexion: 170 (Pain)     Muscle Strength   The patient has normal left shoulder strength      Tests   Apprehension: negative  Cross arm: positive  Impingement: positive  Drop arm: negative    Other   Erythema: absent  Sensation: normal  Pulse: present             No new images for review

## 2020-01-01 ENCOUNTER — OFFICE VISIT (OUTPATIENT)
Dept: LAB | Facility: HOSPITAL | Age: 65
End: 2020-01-01
Attending: NEUROLOGICAL SURGERY
Payer: COMMERCIAL

## 2020-01-01 ENCOUNTER — CLINICAL SUPPORT (OUTPATIENT)
Dept: CARDIAC REHAB | Facility: HOSPITAL | Age: 65
End: 2020-01-01
Attending: INTERNAL MEDICINE
Payer: COMMERCIAL

## 2020-01-01 ENCOUNTER — TELEPHONE (OUTPATIENT)
Dept: NEUROSURGERY | Facility: CLINIC | Age: 65
End: 2020-01-01

## 2020-01-01 ENCOUNTER — APPOINTMENT (OUTPATIENT)
Dept: RADIOLOGY | Facility: HOSPITAL | Age: 65
End: 2020-01-01
Payer: COMMERCIAL

## 2020-01-01 ENCOUNTER — PREP FOR PROCEDURE (OUTPATIENT)
Dept: NEUROSURGERY | Facility: CLINIC | Age: 65
End: 2020-01-01

## 2020-01-01 ENCOUNTER — APPOINTMENT (OUTPATIENT)
Dept: LAB | Facility: HOSPITAL | Age: 65
End: 2020-01-01
Attending: INTERNAL MEDICINE
Payer: COMMERCIAL

## 2020-01-01 ENCOUNTER — HOSPITAL ENCOUNTER (OUTPATIENT)
Facility: HOSPITAL | Age: 65
Setting detail: OUTPATIENT SURGERY
Discharge: HOME | End: 2020-10-16
Attending: NEUROLOGICAL SURGERY | Admitting: NEUROLOGICAL SURGERY
Payer: COMMERCIAL

## 2020-01-01 ENCOUNTER — ANESTHESIA EVENT (OUTPATIENT)
Dept: PERIOP | Facility: HOSPITAL | Age: 65
End: 2020-01-01
Payer: COMMERCIAL

## 2020-01-01 ENCOUNTER — ANESTHESIA (OUTPATIENT)
Dept: PERIOP | Facility: HOSPITAL | Age: 65
End: 2020-01-01
Payer: COMMERCIAL

## 2020-01-01 ENCOUNTER — CLINICAL SUPPORT (OUTPATIENT)
Dept: NEUROSURGERY | Facility: CLINIC | Age: 65
End: 2020-01-01

## 2020-01-01 ENCOUNTER — APPOINTMENT (OUTPATIENT)
Dept: RADIOLOGY | Facility: CLINIC | Age: 65
End: 2020-01-01
Payer: COMMERCIAL

## 2020-01-01 ENCOUNTER — EVALUATION (OUTPATIENT)
Dept: PHYSICAL THERAPY | Facility: CLINIC | Age: 65
End: 2020-01-01
Payer: COMMERCIAL

## 2020-01-01 ENCOUNTER — TELEPHONE (OUTPATIENT)
Dept: CARDIOLOGY CLINIC | Facility: CLINIC | Age: 65
End: 2020-01-01

## 2020-01-01 ENCOUNTER — TELEPHONE (OUTPATIENT)
Dept: OTHER | Facility: OTHER | Age: 65
End: 2020-01-01

## 2020-01-01 ENCOUNTER — OFFICE VISIT (OUTPATIENT)
Dept: NEUROSURGERY | Facility: CLINIC | Age: 65
End: 2020-01-01

## 2020-01-01 ENCOUNTER — OFFICE VISIT (OUTPATIENT)
Dept: CARDIOLOGY CLINIC | Facility: CLINIC | Age: 65
End: 2020-01-01
Payer: COMMERCIAL

## 2020-01-01 ENCOUNTER — DOCUMENTATION (OUTPATIENT)
Dept: NEUROSURGERY | Facility: CLINIC | Age: 65
End: 2020-01-01

## 2020-01-01 VITALS — HEART RATE: 80 BPM

## 2020-01-01 VITALS
DIASTOLIC BLOOD PRESSURE: 88 MMHG | SYSTOLIC BLOOD PRESSURE: 136 MMHG | HEART RATE: 60 BPM | TEMPERATURE: 98.4 F | WEIGHT: 259 LBS | BODY MASS INDEX: 32.2 KG/M2 | HEIGHT: 75 IN

## 2020-01-01 VITALS
WEIGHT: 252 LBS | DIASTOLIC BLOOD PRESSURE: 82 MMHG | HEIGHT: 75 IN | HEART RATE: 64 BPM | RESPIRATION RATE: 16 BRPM | TEMPERATURE: 97.6 F | SYSTOLIC BLOOD PRESSURE: 145 MMHG | BODY MASS INDEX: 31.33 KG/M2

## 2020-01-01 VITALS — DIASTOLIC BLOOD PRESSURE: 92 MMHG | SYSTOLIC BLOOD PRESSURE: 150 MMHG | TEMPERATURE: 97.5 F

## 2020-01-01 VITALS
DIASTOLIC BLOOD PRESSURE: 81 MMHG | HEART RATE: 58 BPM | SYSTOLIC BLOOD PRESSURE: 146 MMHG | OXYGEN SATURATION: 96 % | RESPIRATION RATE: 16 BRPM | BODY MASS INDEX: 32.37 KG/M2 | HEIGHT: 75 IN | TEMPERATURE: 96.1 F

## 2020-01-01 DIAGNOSIS — L40.50 PSORIATIC ARTHRITIS (HCC): ICD-10-CM

## 2020-01-01 DIAGNOSIS — G89.18 POST-OPERATIVE PAIN: ICD-10-CM

## 2020-01-01 DIAGNOSIS — M48.02 CERVICAL SPINAL STENOSIS: ICD-10-CM

## 2020-01-01 DIAGNOSIS — Z98.1 S/P CERVICAL SPINAL FUSION: ICD-10-CM

## 2020-01-01 DIAGNOSIS — M47.812 CERVICAL SPONDYLOSIS WITHOUT MYELOPATHY: ICD-10-CM

## 2020-01-01 DIAGNOSIS — M54.12 CERVICAL RADICULOPATHY: Primary | ICD-10-CM

## 2020-01-01 DIAGNOSIS — E78.00 HYPERCHOLESTEROLEMIA: ICD-10-CM

## 2020-01-01 DIAGNOSIS — M54.12 CERVICAL RADICULOPATHY: ICD-10-CM

## 2020-01-01 DIAGNOSIS — I25.10 CORONARY ARTERY DISEASE INVOLVING NATIVE CORONARY ARTERY OF NATIVE HEART WITHOUT ANGINA PECTORIS: Primary | ICD-10-CM

## 2020-01-01 DIAGNOSIS — M48.02 CERVICAL SPINAL STENOSIS: Primary | ICD-10-CM

## 2020-01-01 DIAGNOSIS — M19.012 OSTEOARTHRITIS OF GLENOHUMERAL JOINT, LEFT: ICD-10-CM

## 2020-01-01 DIAGNOSIS — Z98.890 POST-OPERATIVE STATE: Primary | ICD-10-CM

## 2020-01-01 DIAGNOSIS — Z09 POSTOPERATIVE EXAMINATION: ICD-10-CM

## 2020-01-01 DIAGNOSIS — Z11.59 SCREENING FOR VIRAL DISEASE: ICD-10-CM

## 2020-01-01 DIAGNOSIS — I25.10 CORONARY ARTERY DISEASE INVOLVING NATIVE CORONARY ARTERY OF NATIVE HEART WITHOUT ANGINA PECTORIS: ICD-10-CM

## 2020-01-01 DIAGNOSIS — D68.9 COAGULATION DISORDER (HCC): ICD-10-CM

## 2020-01-01 DIAGNOSIS — M75.42 IMPINGEMENT SYNDROME OF LEFT SHOULDER: ICD-10-CM

## 2020-01-01 DIAGNOSIS — Z95.5 S/P CORONARY ARTERY STENT PLACEMENT: ICD-10-CM

## 2020-01-01 DIAGNOSIS — M46.1 SACROILIITIS (HCC): ICD-10-CM

## 2020-01-01 DIAGNOSIS — Z98.61 HISTORY OF PTCA: ICD-10-CM

## 2020-01-01 DIAGNOSIS — I10 ESSENTIAL HYPERTENSION: ICD-10-CM

## 2020-01-01 DIAGNOSIS — Z96.611 S/P SHOULDER REPLACEMENT, RIGHT: ICD-10-CM

## 2020-01-01 DIAGNOSIS — M25.511 CHRONIC RIGHT SHOULDER PAIN: ICD-10-CM

## 2020-01-01 DIAGNOSIS — R29.3 POSTURE ABNORMALITY: ICD-10-CM

## 2020-01-01 DIAGNOSIS — G89.4 CHRONIC PAIN SYNDROME: ICD-10-CM

## 2020-01-01 DIAGNOSIS — Z98.890 POST-OPERATIVE STATE: ICD-10-CM

## 2020-01-01 DIAGNOSIS — M51.36 LUMBAR DEGENERATIVE DISC DISEASE: ICD-10-CM

## 2020-01-01 DIAGNOSIS — Z95.5 S/P DRUG ELUTING CORONARY STENT PLACEMENT: ICD-10-CM

## 2020-01-01 DIAGNOSIS — Z95.5 STENTED CORONARY ARTERY: ICD-10-CM

## 2020-01-01 DIAGNOSIS — Z09 POSTOPERATIVE EXAMINATION: Primary | ICD-10-CM

## 2020-01-01 DIAGNOSIS — M54.2 NECK PAIN: ICD-10-CM

## 2020-01-01 DIAGNOSIS — G89.29 CHRONIC RIGHT SHOULDER PAIN: ICD-10-CM

## 2020-01-01 LAB
ALBUMIN SERPL BCP-MCNC: 3.8 G/DL (ref 3.5–5)
ALP SERPL-CCNC: 125 U/L (ref 46–116)
ALT SERPL W P-5'-P-CCNC: 30 U/L (ref 12–78)
ANION GAP SERPL CALCULATED.3IONS-SCNC: 1 MMOL/L (ref 4–13)
APTT PPP: 35 SECONDS (ref 23–37)
AST SERPL W P-5'-P-CCNC: 16 U/L (ref 5–45)
ATRIAL RATE: 55 BPM
BACTERIA UR QL AUTO: NORMAL /HPF
BASOPHILS # BLD AUTO: 0.09 THOUSANDS/ΜL (ref 0–0.1)
BASOPHILS NFR BLD AUTO: 1 % (ref 0–1)
BILIRUB SERPL-MCNC: 0.83 MG/DL (ref 0.2–1)
BILIRUB UR QL STRIP: NEGATIVE
BUN SERPL-MCNC: 22 MG/DL (ref 5–25)
CALCIUM SERPL-MCNC: 9.2 MG/DL (ref 8.3–10.1)
CHLORIDE SERPL-SCNC: 110 MMOL/L (ref 100–108)
CHOLEST SERPL-MCNC: 123 MG/DL (ref 50–200)
CLARITY UR: CLEAR
CO2 SERPL-SCNC: 31 MMOL/L (ref 21–32)
COLOR UR: ABNORMAL
CREAT SERPL-MCNC: 1.2 MG/DL (ref 0.6–1.3)
CRP SERPL QL: 6.8 MG/L
EOSINOPHIL # BLD AUTO: 0.67 THOUSAND/ΜL (ref 0–0.61)
EOSINOPHIL NFR BLD AUTO: 7 % (ref 0–6)
ERYTHROCYTE [DISTWIDTH] IN BLOOD BY AUTOMATED COUNT: 13.7 % (ref 11.6–15.1)
ERYTHROCYTE [SEDIMENTATION RATE] IN BLOOD: 14 MM/HOUR (ref 0–19)
EST. AVERAGE GLUCOSE BLD GHB EST-MCNC: 120 MG/DL
GFR SERPL CREATININE-BSD FRML MDRD: 64 ML/MIN/1.73SQ M
GLUCOSE P FAST SERPL-MCNC: 98 MG/DL (ref 65–99)
GLUCOSE SERPL-MCNC: 95 MG/DL (ref 65–140)
GLUCOSE UR STRIP-MCNC: NEGATIVE MG/DL
HBA1C MFR BLD: 5.8 %
HCT VFR BLD AUTO: 50.2 % (ref 36.5–49.3)
HDLC SERPL-MCNC: 61 MG/DL
HGB BLD-MCNC: 16.2 G/DL (ref 12–17)
HGB UR QL STRIP.AUTO: ABNORMAL
HLA-B27 QL NAA+PROBE: NEGATIVE
IMM GRANULOCYTES # BLD AUTO: 0.02 THOUSAND/UL (ref 0–0.2)
IMM GRANULOCYTES NFR BLD AUTO: 0 % (ref 0–2)
INR PPP: 0.96 (ref 0.84–1.19)
KETONES UR STRIP-MCNC: NEGATIVE MG/DL
LDLC SERPL CALC-MCNC: 44 MG/DL (ref 0–100)
LEUKOCYTE ESTERASE UR QL STRIP: NEGATIVE
LYMPHOCYTES # BLD AUTO: 1.55 THOUSANDS/ΜL (ref 0.6–4.47)
LYMPHOCYTES NFR BLD AUTO: 16 % (ref 14–44)
MCH RBC QN AUTO: 29 PG (ref 26.8–34.3)
MCHC RBC AUTO-ENTMCNC: 32.3 G/DL (ref 31.4–37.4)
MCV RBC AUTO: 90 FL (ref 82–98)
MONOCYTES # BLD AUTO: 0.99 THOUSAND/ΜL (ref 0.17–1.22)
MONOCYTES NFR BLD AUTO: 11 % (ref 4–12)
MRSA NOSE QL CULT: NORMAL
NEUTROPHILS # BLD AUTO: 6.12 THOUSANDS/ΜL (ref 1.85–7.62)
NEUTS SEG NFR BLD AUTO: 65 % (ref 43–75)
NITRITE UR QL STRIP: NEGATIVE
NON-SQ EPI CELLS URNS QL MICRO: NORMAL /HPF
NONHDLC SERPL-MCNC: 62 MG/DL
NRBC BLD AUTO-RTO: 0 /100 WBCS
P AXIS: 60 DEGREES
PH UR STRIP.AUTO: 7 [PH]
PLATELET # BLD AUTO: 257 THOUSANDS/UL (ref 149–390)
PMV BLD AUTO: 10.6 FL (ref 8.9–12.7)
POTASSIUM SERPL-SCNC: 4 MMOL/L (ref 3.5–5.3)
PR INTERVAL: 168 MS
PROT SERPL-MCNC: 6.9 G/DL (ref 6.4–8.2)
PROT UR STRIP-MCNC: NEGATIVE MG/DL
PROTHROMBIN TIME: 12.8 SECONDS (ref 11.6–14.5)
QRS AXIS: 34 DEGREES
QRSD INTERVAL: 70 MS
QT INTERVAL: 384 MS
QTC INTERVAL: 367 MS
RBC # BLD AUTO: 5.58 MILLION/UL (ref 3.88–5.62)
RBC #/AREA URNS AUTO: NORMAL /HPF
SARS-COV-2 RNA SPEC QL NAA+PROBE: NOT DETECTED
SODIUM SERPL-SCNC: 142 MMOL/L (ref 136–145)
SP GR UR STRIP.AUTO: 1.03 (ref 1–1.03)
T WAVE AXIS: 32 DEGREES
TRIGL SERPL-MCNC: 92 MG/DL
UROBILINOGEN UR QL STRIP.AUTO: 0.2 E.U./DL
VENTRICULAR RATE: 55 BPM
WBC # BLD AUTO: 9.44 THOUSAND/UL (ref 4.31–10.16)
WBC #/AREA URNS AUTO: NORMAL /HPF

## 2020-01-01 PROCEDURE — 36415 COLL VENOUS BLD VENIPUNCTURE: CPT

## 2020-01-01 PROCEDURE — U0003 INFECTIOUS AGENT DETECTION BY NUCLEIC ACID (DNA OR RNA); SEVERE ACUTE RESPIRATORY SYNDROME CORONAVIRUS 2 (SARS-COV-2) (CORONAVIRUS DISEASE [COVID-19]), AMPLIFIED PROBE TECHNIQUE, MAKING USE OF HIGH THROUGHPUT TECHNOLOGIES AS DESCRIBED BY CMS-2020-01-R: HCPCS | Performed by: NEUROLOGICAL SURGERY

## 2020-01-01 PROCEDURE — 83036 HEMOGLOBIN GLYCOSYLATED A1C: CPT

## 2020-01-01 PROCEDURE — 99024 POSTOP FOLLOW-UP VISIT: CPT | Performed by: NEUROLOGICAL SURGERY

## 2020-01-01 PROCEDURE — 22551 ARTHRD ANT NTRBDY CERVICAL: CPT | Performed by: NEUROLOGICAL SURGERY

## 2020-01-01 PROCEDURE — 85730 THROMBOPLASTIN TIME PARTIAL: CPT

## 2020-01-01 PROCEDURE — 87081 CULTURE SCREEN ONLY: CPT

## 2020-01-01 PROCEDURE — 80061 LIPID PANEL: CPT

## 2020-01-01 PROCEDURE — 22845 INSERT SPINE FIXATION DEVICE: CPT | Performed by: NEUROLOGICAL SURGERY

## 2020-01-01 PROCEDURE — 93798 PHYS/QHP OP CAR RHAB W/ECG: CPT

## 2020-01-01 PROCEDURE — 81001 URINALYSIS AUTO W/SCOPE: CPT

## 2020-01-01 PROCEDURE — 81374 HLA I TYPING 1 ANTIGEN LR: CPT

## 2020-01-01 PROCEDURE — 86140 C-REACTIVE PROTEIN: CPT

## 2020-01-01 PROCEDURE — 22853 INSJ BIOMECHANICAL DEVICE: CPT | Performed by: NEUROLOGICAL SURGERY

## 2020-01-01 PROCEDURE — 72040 X-RAY EXAM NECK SPINE 2-3 VW: CPT

## 2020-01-01 PROCEDURE — 93005 ELECTROCARDIOGRAM TRACING: CPT

## 2020-01-01 PROCEDURE — 85652 RBC SED RATE AUTOMATED: CPT

## 2020-01-01 PROCEDURE — 93010 ELECTROCARDIOGRAM REPORT: CPT | Performed by: INTERNAL MEDICINE

## 2020-01-01 PROCEDURE — 99024 POSTOP FOLLOW-UP VISIT: CPT | Performed by: PHYSICIAN ASSISTANT

## 2020-01-01 PROCEDURE — 99244 OFF/OP CNSLTJ NEW/EST MOD 40: CPT | Performed by: INTERNAL MEDICINE

## 2020-01-01 PROCEDURE — 82948 REAGENT STRIP/BLOOD GLUCOSE: CPT

## 2020-01-01 PROCEDURE — 99024 POSTOP FOLLOW-UP VISIT: CPT

## 2020-01-01 PROCEDURE — C1713 ANCHOR/SCREW BN/BN,TIS/BN: HCPCS | Performed by: NEUROLOGICAL SURGERY

## 2020-01-01 PROCEDURE — 97162 PT EVAL MOD COMPLEX 30 MIN: CPT | Performed by: PHYSICAL THERAPIST

## 2020-01-01 PROCEDURE — 20930 SP BONE ALGRFT MORSEL ADD-ON: CPT | Performed by: NEUROLOGICAL SURGERY

## 2020-01-01 PROCEDURE — 85610 PROTHROMBIN TIME: CPT

## 2020-01-01 PROCEDURE — 97110 THERAPEUTIC EXERCISES: CPT | Performed by: PHYSICAL THERAPIST

## 2020-01-01 PROCEDURE — 85025 COMPLETE CBC W/AUTO DIFF WBC: CPT

## 2020-01-01 PROCEDURE — 80053 COMPREHEN METABOLIC PANEL: CPT | Performed by: INTERNAL MEDICINE

## 2020-01-01 DEVICE — IMPLANT 6240741 ANATOMIC 14X11X7MM
Type: IMPLANTABLE DEVICE | Site: SPINE CERVICAL | Status: FUNCTIONAL
Brand: VERTE-STACK® SPINAL SYSTEM

## 2020-01-01 DEVICE — PLATE 3001021 ZEVO 21MM 1 LVL
Type: IMPLANTABLE DEVICE | Site: SPINE CERVICAL | Status: FUNCTIONAL
Brand: ZEVO™ ANTERIOR CERVICAL PLATE SYSTEM

## 2020-01-01 DEVICE — DBM 005005 PROGENIX DBM 5 CC SRVC FEE
Type: IMPLANTABLE DEVICE | Site: SPINE CERVICAL | Status: FUNCTIONAL
Brand: PROGENIX® PUTTY AND PROGENIX® PLUS

## 2020-01-01 RX ORDER — ISOSORBIDE MONONITRATE 30 MG/1
30 TABLET, EXTENDED RELEASE ORAL DAILY
Status: ON HOLD | COMMUNITY
End: 2020-01-01 | Stop reason: ALTCHOICE

## 2020-01-01 RX ORDER — SODIUM CHLORIDE, SODIUM LACTATE, POTASSIUM CHLORIDE, CALCIUM CHLORIDE 600; 310; 30; 20 MG/100ML; MG/100ML; MG/100ML; MG/100ML
75 INJECTION, SOLUTION INTRAVENOUS CONTINUOUS
Status: DISCONTINUED | OUTPATIENT
Start: 2020-01-01 | End: 2020-01-01 | Stop reason: HOSPADM

## 2020-01-01 RX ORDER — KETAMINE HCL IN NACL, ISO-OSM 100MG/10ML
SYRINGE (ML) INJECTION AS NEEDED
Status: DISCONTINUED | OUTPATIENT
Start: 2020-01-01 | End: 2020-01-01

## 2020-01-01 RX ORDER — SUCCINYLCHOLINE/SOD CL,ISO/PF 100 MG/5ML
SYRINGE (ML) INTRAVENOUS AS NEEDED
Status: DISCONTINUED | OUTPATIENT
Start: 2020-01-01 | End: 2020-01-01

## 2020-01-01 RX ORDER — OXYCODONE HYDROCHLORIDE 5 MG/1
10 CAPSULE ORAL EVERY 4 HOURS PRN
Qty: 30 CAPSULE | Refills: 0 | Status: SHIPPED | OUTPATIENT
Start: 2020-01-01 | End: 2020-01-01

## 2020-01-01 RX ORDER — NITROGLYCERIN 0.4 MG/1
0.4 TABLET SUBLINGUAL
Qty: 30 TABLET | Refills: 1 | Status: SHIPPED | OUTPATIENT
Start: 2020-01-01

## 2020-01-01 RX ORDER — BUPIVACAINE HYDROCHLORIDE AND EPINEPHRINE 5; 5 MG/ML; UG/ML
INJECTION, SOLUTION EPIDURAL; INTRACAUDAL; PERINEURAL AS NEEDED
Status: DISCONTINUED | OUTPATIENT
Start: 2020-01-01 | End: 2020-01-01 | Stop reason: HOSPADM

## 2020-01-01 RX ORDER — MIDAZOLAM HYDROCHLORIDE 2 MG/2ML
INJECTION, SOLUTION INTRAMUSCULAR; INTRAVENOUS AS NEEDED
Status: DISCONTINUED | OUTPATIENT
Start: 2020-01-01 | End: 2020-01-01

## 2020-01-01 RX ORDER — FENTANYL CITRATE 50 UG/ML
INJECTION, SOLUTION INTRAMUSCULAR; INTRAVENOUS AS NEEDED
Status: DISCONTINUED | OUTPATIENT
Start: 2020-01-01 | End: 2020-01-01

## 2020-01-01 RX ORDER — OXYCODONE HYDROCHLORIDE 5 MG/1
10 TABLET ORAL EVERY 4 HOURS PRN
Status: DISCONTINUED | OUTPATIENT
Start: 2020-01-01 | End: 2020-01-01 | Stop reason: HOSPADM

## 2020-01-01 RX ORDER — PROPOFOL 10 MG/ML
INJECTION, EMULSION INTRAVENOUS CONTINUOUS PRN
Status: DISCONTINUED | OUTPATIENT
Start: 2020-01-01 | End: 2020-01-01

## 2020-01-01 RX ORDER — ONDANSETRON 2 MG/ML
INJECTION INTRAMUSCULAR; INTRAVENOUS AS NEEDED
Status: DISCONTINUED | OUTPATIENT
Start: 2020-01-01 | End: 2020-01-01

## 2020-01-01 RX ORDER — FENTANYL CITRATE/PF 50 MCG/ML
25 SYRINGE (ML) INJECTION
Status: DISCONTINUED | OUTPATIENT
Start: 2020-01-01 | End: 2020-01-01 | Stop reason: HOSPADM

## 2020-01-01 RX ORDER — CLOPIDOGREL BISULFATE 75 MG/1
75 TABLET ORAL DAILY
COMMUNITY

## 2020-01-01 RX ORDER — MAGNESIUM HYDROXIDE 1200 MG/15ML
LIQUID ORAL AS NEEDED
Status: DISCONTINUED | OUTPATIENT
Start: 2020-01-01 | End: 2020-01-01 | Stop reason: HOSPADM

## 2020-01-01 RX ORDER — LIDOCAINE HYDROCHLORIDE 10 MG/ML
INJECTION, SOLUTION EPIDURAL; INFILTRATION; INTRACAUDAL; PERINEURAL AS NEEDED
Status: DISCONTINUED | OUTPATIENT
Start: 2020-01-01 | End: 2020-01-01

## 2020-01-01 RX ORDER — SODIUM CHLORIDE 9 MG/ML
125 INJECTION, SOLUTION INTRAVENOUS CONTINUOUS
Status: DISCONTINUED | OUTPATIENT
Start: 2020-01-01 | End: 2020-01-01 | Stop reason: HOSPADM

## 2020-01-01 RX ORDER — ONDANSETRON 2 MG/ML
4 INJECTION INTRAMUSCULAR; INTRAVENOUS EVERY 6 HOURS PRN
Status: DISCONTINUED | OUTPATIENT
Start: 2020-01-01 | End: 2020-01-01 | Stop reason: HOSPADM

## 2020-01-01 RX ORDER — PROPOFOL 10 MG/ML
INJECTION, EMULSION INTRAVENOUS AS NEEDED
Status: DISCONTINUED | OUTPATIENT
Start: 2020-01-01 | End: 2020-01-01

## 2020-01-01 RX ORDER — CHLORHEXIDINE GLUCONATE 0.12 MG/ML
15 RINSE ORAL ONCE
Status: COMPLETED | OUTPATIENT
Start: 2020-01-01 | End: 2020-01-01

## 2020-01-01 RX ORDER — NEOSTIGMINE METHYLSULFATE 1 MG/ML
INJECTION INTRAVENOUS AS NEEDED
Status: DISCONTINUED | OUTPATIENT
Start: 2020-01-01 | End: 2020-01-01

## 2020-01-01 RX ORDER — OXYCODONE HYDROCHLORIDE 5 MG/1
5 TABLET ORAL EVERY 4 HOURS PRN
Status: DISCONTINUED | OUTPATIENT
Start: 2020-01-01 | End: 2020-01-01 | Stop reason: HOSPADM

## 2020-01-01 RX ORDER — ACETAMINOPHEN 325 MG/1
650 TABLET ORAL EVERY 4 HOURS PRN
Status: DISCONTINUED | OUTPATIENT
Start: 2020-01-01 | End: 2020-01-01 | Stop reason: HOSPADM

## 2020-01-01 RX ORDER — ROCURONIUM BROMIDE 10 MG/ML
INJECTION, SOLUTION INTRAVENOUS AS NEEDED
Status: DISCONTINUED | OUTPATIENT
Start: 2020-01-01 | End: 2020-01-01

## 2020-01-01 RX ORDER — ASPIRIN 81 MG/1
81 TABLET ORAL DAILY
COMMUNITY

## 2020-01-01 RX ORDER — GINSENG 100 MG
CAPSULE ORAL AS NEEDED
Status: DISCONTINUED | OUTPATIENT
Start: 2020-01-01 | End: 2020-01-01 | Stop reason: HOSPADM

## 2020-01-01 RX ORDER — LIDOCAINE HYDROCHLORIDE AND EPINEPHRINE 10; 10 MG/ML; UG/ML
INJECTION, SOLUTION INFILTRATION; PERINEURAL AS NEEDED
Status: DISCONTINUED | OUTPATIENT
Start: 2020-01-01 | End: 2020-01-01 | Stop reason: HOSPADM

## 2020-01-01 RX ORDER — DEXAMETHASONE SODIUM PHOSPHATE 10 MG/ML
INJECTION, SOLUTION INTRAMUSCULAR; INTRAVENOUS AS NEEDED
Status: DISCONTINUED | OUTPATIENT
Start: 2020-01-01 | End: 2020-01-01

## 2020-01-01 RX ORDER — GLYCOPYRROLATE 0.2 MG/ML
INJECTION INTRAMUSCULAR; INTRAVENOUS AS NEEDED
Status: DISCONTINUED | OUTPATIENT
Start: 2020-01-01 | End: 2020-01-01

## 2020-01-01 RX ORDER — HYDROMORPHONE HCL/PF 1 MG/ML
0.5 SYRINGE (ML) INJECTION
Status: DISCONTINUED | OUTPATIENT
Start: 2020-01-01 | End: 2020-01-01 | Stop reason: HOSPADM

## 2020-01-01 RX ADMIN — CHLORHEXIDINE GLUCONATE 15 ML: 1.2 RINSE ORAL at 06:25

## 2020-01-01 RX ADMIN — SODIUM CHLORIDE, SODIUM LACTATE, POTASSIUM CHLORIDE, AND CALCIUM CHLORIDE: .6; .31; .03; .02 INJECTION, SOLUTION INTRAVENOUS at 07:33

## 2020-01-01 RX ADMIN — ROCURONIUM BROMIDE 45 MG: 10 INJECTION, SOLUTION INTRAVENOUS at 07:48

## 2020-01-01 RX ADMIN — MIDAZOLAM 2 MG: 1 INJECTION INTRAMUSCULAR; INTRAVENOUS at 07:33

## 2020-01-01 RX ADMIN — PHENYLEPHRINE HYDROCHLORIDE 20 MCG: 10 INJECTION INTRAVENOUS at 08:40

## 2020-01-01 RX ADMIN — ROCURONIUM BROMIDE 5 MG: 10 INJECTION, SOLUTION INTRAVENOUS at 07:38

## 2020-01-01 RX ADMIN — GLYCOPYRROLATE 0.8 MG: 0.2 INJECTION, SOLUTION INTRAMUSCULAR; INTRAVENOUS at 09:12

## 2020-01-01 RX ADMIN — LIDOCAINE HYDROCHLORIDE 50 MG: 10 INJECTION, SOLUTION EPIDURAL; INFILTRATION; INTRACAUDAL; PERINEURAL at 07:38

## 2020-01-01 RX ADMIN — PHENYLEPHRINE HYDROCHLORIDE 10 MCG/MIN: 10 INJECTION INTRAVENOUS at 07:40

## 2020-01-01 RX ADMIN — SODIUM CHLORIDE: 0.9 INJECTION, SOLUTION INTRAVENOUS at 07:48

## 2020-01-01 RX ADMIN — OXYCODONE HYDROCHLORIDE 10 MG: 5 TABLET ORAL at 12:10

## 2020-01-01 RX ADMIN — ONDANSETRON 4 MG: 2 INJECTION INTRAMUSCULAR; INTRAVENOUS at 09:16

## 2020-01-01 RX ADMIN — REMIFENTANIL HYDROCHLORIDE 0.1 MCG/KG/MIN: 1 INJECTION, POWDER, LYOPHILIZED, FOR SOLUTION INTRAVENOUS at 07:38

## 2020-01-01 RX ADMIN — Medication 25 MCG: at 10:35

## 2020-01-01 RX ADMIN — Medication 100 MG: at 07:38

## 2020-01-01 RX ADMIN — Medication 2000 MG: at 07:51

## 2020-01-01 RX ADMIN — FENTANYL CITRATE 50 MCG: 50 INJECTION, SOLUTION INTRAMUSCULAR; INTRAVENOUS at 09:05

## 2020-01-01 RX ADMIN — GLYCOPYRROLATE 0.2 MG: 0.2 INJECTION, SOLUTION INTRAMUSCULAR; INTRAVENOUS at 07:38

## 2020-01-01 RX ADMIN — PHENYLEPHRINE HYDROCHLORIDE 100 MCG: 10 INJECTION INTRAVENOUS at 09:12

## 2020-01-01 RX ADMIN — DEXAMETHASONE SODIUM PHOSPHATE 10 MG: 10 INJECTION, SOLUTION INTRAMUSCULAR; INTRAVENOUS at 08:26

## 2020-01-01 RX ADMIN — PROPOFOL 120 MCG/KG/MIN: 10 INJECTION, EMULSION INTRAVENOUS at 07:38

## 2020-01-01 RX ADMIN — PROPOFOL 200 MG: 10 INJECTION, EMULSION INTRAVENOUS at 07:38

## 2020-01-01 RX ADMIN — Medication 25 MCG: at 10:11

## 2020-01-01 RX ADMIN — NEOSTIGMINE METHYLSULFATE 4 MG: 1 INJECTION, SOLUTION INTRAVENOUS at 09:12

## 2020-01-01 RX ADMIN — Medication 25 MG: at 07:38

## 2020-01-02 ENCOUNTER — TELEPHONE (OUTPATIENT)
Dept: PAIN MEDICINE | Facility: CLINIC | Age: 65
End: 2020-01-02

## 2020-01-02 NOTE — TELEPHONE ENCOUNTER
Patient called & left message on S/P voicemail from 12/31/19 requesting to confirm his appointment  I lmom confirming his consult scheduled on 1/6/20 & our office hours & call back number

## 2020-01-03 ENCOUNTER — TELEPHONE (OUTPATIENT)
Dept: OBGYN CLINIC | Facility: HOSPITAL | Age: 65
End: 2020-01-03

## 2020-01-03 ENCOUNTER — TELEPHONE (OUTPATIENT)
Dept: PAIN MEDICINE | Facility: CLINIC | Age: 65
End: 2020-01-03

## 2020-01-03 NOTE — TELEPHONE ENCOUNTER
Rafat Barcenas from the [place] called and said they still do not have a referral, they are working on it and the patient needs to be canceled   [facility] will contact patient and us to schedule once it is authorized    Rafat Barcenas can be contacted at [phone]

## 2020-01-03 NOTE — TELEPHONE ENCOUNTER
LMOM FOR PATIENT TO RETURN OUR CALL  WE HAVE NOT YET RECEIVED HIS [facility] REFERRAL FOR PAIN MANAGEMENT      PLEASE RESCHEDULE NEXT WEEK WITH DR Dian Rand

## 2020-01-03 NOTE — TELEPHONE ENCOUNTER
Kishan Roberts from [place] [facility] is calling to rquest notes from Dr Sweetie Adame that support he is to be referred to Pain Management      Faxed 12/13/19 office visit note to: [phone]

## 2020-01-06 NOTE — TELEPHONE ENCOUNTER
Patient is aware of appt needing to be canceled due to not having referral from [facility]

## 2020-01-14 ENCOUNTER — CONSULT (OUTPATIENT)
Dept: PAIN MEDICINE | Facility: CLINIC | Age: 65
End: 2020-01-14
Payer: OTHER GOVERNMENT

## 2020-01-14 VITALS
DIASTOLIC BLOOD PRESSURE: 82 MMHG | BODY MASS INDEX: 33.57 KG/M2 | HEART RATE: 52 BPM | WEIGHT: 270 LBS | SYSTOLIC BLOOD PRESSURE: 140 MMHG | HEIGHT: 75 IN

## 2020-01-14 DIAGNOSIS — M46.1 SACROILIITIS (HCC): ICD-10-CM

## 2020-01-14 DIAGNOSIS — M47.816 LUMBAR SPONDYLOSIS: ICD-10-CM

## 2020-01-14 DIAGNOSIS — M54.12 CERVICAL RADICULOPATHY: Primary | ICD-10-CM

## 2020-01-14 DIAGNOSIS — G89.29 CHRONIC RIGHT SHOULDER PAIN: ICD-10-CM

## 2020-01-14 DIAGNOSIS — Z96.611 S/P SHOULDER REPLACEMENT, RIGHT: ICD-10-CM

## 2020-01-14 DIAGNOSIS — M25.511 CHRONIC RIGHT SHOULDER PAIN: ICD-10-CM

## 2020-01-14 PROCEDURE — 99204 OFFICE O/P NEW MOD 45 MIN: CPT | Performed by: ANESTHESIOLOGY

## 2020-01-14 RX ORDER — CLOPIDOGREL BISULFATE 75 MG/1
TABLET ORAL
COMMUNITY
End: 2020-01-01 | Stop reason: HOSPADM

## 2020-01-14 RX ORDER — CYCLOBENZAPRINE HCL 10 MG
TABLET ORAL
COMMUNITY
End: 2020-01-16 | Stop reason: SDUPTHER

## 2020-01-14 RX ORDER — METOPROLOL SUCCINATE 50 MG/1
25 TABLET, EXTENDED RELEASE ORAL DAILY
COMMUNITY

## 2020-01-14 RX ORDER — ISOSORBIDE DINITRATE 30 MG/1
30 TABLET ORAL DAILY
COMMUNITY
End: 2020-01-01 | Stop reason: ALTCHOICE

## 2020-01-14 RX ORDER — KETOCONAZOLE 20 MG/G
CREAM TOPICAL
COMMUNITY
End: 2020-03-05

## 2020-01-14 RX ORDER — BUDESONIDE 0.25 MG/2ML
INHALANT ORAL
COMMUNITY
End: 2020-03-10

## 2020-01-14 RX ORDER — ASPIRIN 81 MG/1
81 TABLET ORAL DAILY
COMMUNITY
Start: 2019-01-22 | End: 2020-01-01 | Stop reason: HOSPADM

## 2020-01-14 RX ORDER — NAPROXEN 500 MG/1
TABLET ORAL
COMMUNITY
End: 2020-03-10

## 2020-01-14 RX ORDER — DONEPEZIL HYDROCHLORIDE 10 MG/1
TABLET, FILM COATED ORAL
COMMUNITY
End: 2020-01-16 | Stop reason: SDUPTHER

## 2020-01-14 RX ORDER — NITROGLYCERIN 0.4 MG/1
0.4 TABLET SUBLINGUAL AS NEEDED
COMMUNITY
Start: 2019-02-18 | End: 2020-01-01 | Stop reason: SDUPTHER

## 2020-01-14 RX ORDER — HYDROCHLOROTHIAZIDE 12.5 MG/1
CAPSULE, GELATIN COATED ORAL
COMMUNITY

## 2020-01-14 RX ORDER — PANTOPRAZOLE SODIUM 20 MG/1
20 TABLET, DELAYED RELEASE ORAL DAILY
COMMUNITY

## 2020-01-14 RX ORDER — ATORVASTATIN CALCIUM 80 MG/1
80 TABLET, FILM COATED ORAL DAILY
COMMUNITY

## 2020-01-14 RX ORDER — ISOSORBIDE MONONITRATE 30 MG/1
TABLET, EXTENDED RELEASE ORAL
COMMUNITY
Start: 2019-11-04 | End: 2020-01-30

## 2020-01-14 RX ORDER — AZELASTINE HYDROCHLORIDE, FLUTICASONE PROPIONATE 137; 50 UG/1; UG/1
1 SPRAY, METERED NASAL AS NEEDED
COMMUNITY

## 2020-01-14 RX ORDER — SERTRALINE HYDROCHLORIDE 100 MG/1
50 TABLET, FILM COATED ORAL
COMMUNITY

## 2020-01-14 NOTE — PROGRESS NOTES
Assessment  1  Cervical radiculopathy    2  Sacroiliitis ([address] ) - Right    3  Lumbar spondylosis    4  Chronic right shoulder pain    5  S/P shoulder replacement, right        Plan  Patient with a history of chronic pain status post right shoulder replacement with right-sided low back pain and right arm pain  He has bilateral knee pain with a right knee replacement my recommendations are as follows:    He has been through extensive physical therapy for cervical spine and with slight neurological deficit I am going to order MRI of the cervical spine trial any significant pathology  He has a right shoulder replacement he has pain on both active and passive range of motion of his right shoulder I believe an orthopedic re-evaluation with Dr Nick Yun would be warranted  The patient's low back pain persists despite time, relative rest, activity modification and therapy  Based on the patient's symptoms and examination, I suspect that their pain is being generated by the sacroiliac joint  I will schedule the patient for intra-articular sacroiliac joint steroid injection under fluoroscopic guidance to address any inflammatory component of the pain  This would serve both diagnostic and hopefully therapeutic purposes  If the patient does not receive significant relief following the injection, it is possible that there is another pain source as the sacroiliac joint is a common pain referral site  It is also possible that the pain is being manifested by an extra-articular sacroiliac pain generator which would not be addressed with an intra-articular injection  Patient does have a history of right-sided low back pain and if the above treatment plan does not provide significant relief one consider his pain being generated by the lumbar facet joints but will re-evaluate if needed  In the office today, we reviewed the nature of the patient's pathology in depth using  diagrams and models    We discussed the approach I would use for the sacroiliac joint injection and provided literature for home review  The patient understands the risks associated with the procedure including bleeding, infection, tissue injury, allergic reaction and paralysis and provided written and verbal consent in the office today  My impressions and treatment recommendations were discussed in detail with the patient who verbalized understanding and had no further questions  Discharge instructions were provided  I personally saw and examined the patient and I agree with the above discussed plan of care  This note is created using dictation transcription  It may contain typographical errors, grammatical errors, improperly dictated words, background noise and other errors  Orders Placed This Encounter   Procedures    [place] spine and pain procedure     Standing Status:   Future     Standing Expiration Date:   1/14/2024     Order Specific Question:   Reason for Exam:     Answer:   right Sacroiliac joint injection     Order Specific Question:   Anticoagulant hold needed? Answer:   no    MRI cervical spine without contrast     Standing Status:   Future     Standing Expiration Date:   1/14/2024     Scheduling Instructions: There is no preparation for this test  Please leave your jewelry and valuables at home, wedding rings are the exception  Please bring your insurance cards, a form of photo ID and a list of your medications with you  Arrive 15 minutes prior to your appointment time in order to register  Please bring any prior CT or MRI studies of this area that were not performed at a [facility]  To schedule this appointment, please contact Central Scheduling at [phone]  Order Specific Question:   What is the patient's sedation requirement?      Answer:   No Sedation    Ambulatory referral to Orthopedic Surgery     Standing Status:   Future     Standing Expiration Date:   1/14/2021     Referral Priority:   Routine Referral Type:   Consult - AMB     Referral Reason:   Specialty Services Required     Referred to Provider:   Kristopher Proctor MD     Requested Specialty:   Orthopedic Surgery     Number of Visits Requested:   1     Expiration Date:   2021     New Medications Ordered This Visit   Medications    aspirin (ASPIRIN 81) 81 mg EC tablet    atorvastatin (LIPITOR) 80 mg tablet     Sig: TAKE ONE-HALF TABLET BY MOUTH EVERY EVENING TO LOWER CHOLESTEROL;MAY CAUSE MUSCLE PAIN-REPORT SIDE EFFECTS TO YOUR PROVIDER*    Azelastine-Fluticasone 137-50 MCG/ACT SUSP     Sig: SPRAY 1 PUFF IN NOSTRIL(S) TWICE A DAY    budesonide (PULMICORT) 0 25 mg/2 mL nebulizer solution     Si 5MG (1 RESPULE) IN NEBULIZER DAILY    Cetirizine HCl 10 MG CAPS     Sig: TAKE ONE TABLET BY MOUTH DAILY FOR ALLERGIES    clopidogrel (PLAVIX) 75 mg tablet     Sig: TAKE ONE TABLET BY MOUTH EVERY DAY TO PREVENT BLOOD CLOTS (SAME AS PLAVIX)    cyclobenzaprine (FLEXERIL) 10 mg tablet     Sig: TAKE ONE TABLET BY MOUTH EVERY DAY AS NEEDED FOR MUSCLE SPASMS    donepezil (ARICEPT) 10 mg tablet     Sig: TAKE ONE TABLET BY MOUTH EVERY DAY FOR MEMORY    hydrochlorothiazide (MICROZIDE) 12 5 mg capsule     Sig: TAKE ONE TABLET BY MOUTH EVERY DAY FOR BLOOD PRESSURE    isosorbide dinitrate (ISORDIL) 30 mg tablet     Sig: TAKE ONE TABLET BY MOUTH DAILY FOR HEART AND/OR ANGINA    ketoconazole (NIZORAL) 2 % cream     Sig: APPLY SMALL AMOUNT TOPICALLY TWICE A DAY    METOPROLOL SUCCINATE ER PO     Sig: TAKE ONE-HALF TABLET BY MOUTH DAILY FOR BLOOD PRESSURE/HEART    naproxen (NAPROSYN) 500 mg tablet     Sig: TAKE ONE TABLET BY MOUTH TWICE A DAY AS NEEDED FOR PAIN OR INFLAMMATION - TAKE WITH FOOD OR MILK    pantoprazole (PROTONIX) 20 mg tablet     Sig: TAKE ONE TABLET BY MOUTH DAILY FOR STOMACH OR REFLUX    sertraline (ZOLOFT) 100 mg tablet     Sig: TAKE ONE-HALF TABLET BY MOUTH EVERY DAY FOR MOOD    Coenzyme Q10-Vitamin E (QUNOL ULTRA COQ10) 100-150 MG-UNIT CAPS    isosorbide mononitrate (IMDUR) 30 mg 24 hr tablet    nitroglycerin (NITROSTAT) 0 4 mg SL tablet    Omega-3 Fatty Acids (SALMON OIL-1000 PO)     REFERRED BY PCP  History of Present Illness    Renetta Reyes is a 60 y o  male with history of being in the [address] also attributing his pain to arthritis  He states his pain in his knees have been going on for approximately 35 years his right shoulder approximately 10 years low back approximately intermittently for 30 years and neck and arm for 10 years  Describes pain in her between moderate to severe his worse of his pain in his right shoulder and neck but also has severe low back pain  He rates his pain as 7/10 on the visual analog scale significant interfering with daily living activities  His pain is constant worse in the afternoon evening describes sharp achy shooting with a pressure-like sensation  Patient has subjective weakness of both the upper lower limbs  Patient reports that lying down sitting exercise relaxation decreases in symptoms will most activities aggravate them  He had moderate relief of his right shoulder pain after right shoulder replacement traction did provided moderate relief of his neck pain physical therapy helped moderately with his low back pain as did chiropractic treatment  Patient recently moved from [place] and just establishing care in this area  I have personally reviewed and/or updated the patient's past medical history, past surgical history, family history, social history, current medications, allergies, and vital signs today  Review of Systems   Constitutional: Negative for fever and unexpected weight change  HENT: Negative for trouble swallowing  Eyes: Negative for visual disturbance  Respiratory: Positive for shortness of breath  Negative for wheezing  Cardiovascular: Negative for chest pain and palpitations     Gastrointestinal: Negative for constipation, diarrhea, nausea and vomiting  Endocrine: Negative for cold intolerance, heat intolerance and polydipsia  Genitourinary: Positive for difficulty urinating  Negative for frequency  Musculoskeletal: Positive for gait problem (decreased rom ) and joint swelling (joint stiffness)  Negative for arthralgias and myalgias  Skin: Negative for rash  Neurological: Positive for weakness  Negative for dizziness, seizures, syncope and headaches  Hematological: Does not bruise/bleed easily  Psychiatric/Behavioral: Positive for dysphoric mood  All other systems reviewed and are negative        Patient Active Problem List   Diagnosis    Cervical spondylosis without myelopathy    Lumbar degenerative disc disease    Inflammation of right sacroiliac joint (HCC)    Shoulder impingement syndrome, left    Osteoarthritis of glenohumeral joint, left       Past Medical History:   Diagnosis Date    [address] disease [address] [address] )     Arthritis        Past Surgical History:   Procedure Laterality Date    CORONARY ANGIOPLASTY WITH STENT PLACEMENT Left     KNEE SURGERY Right 05/2017    replacement    SHOULDER SURGERY Right 11/2017    replacement       Family History   Problem Relation Age of Onset    Colon cancer Mother     Dementia Mother     Prostate cancer Father     Heart disease Father        Social History     Occupational History    Not on file   Tobacco Use    Smoking status: Former Smoker    Smokeless tobacco: Never Used   Substance and Sexual Activity    Alcohol use: Yes     Binge frequency: Monthly    Drug use: Not on file    Sexual activity: Not on file       Current Outpatient Medications on File Prior to Visit   Medication Sig    aspirin (ASPIRIN 81) 81 mg EC tablet     atorvastatin (LIPITOR) 80 mg tablet TAKE ONE-HALF TABLET BY MOUTH EVERY EVENING TO LOWER CHOLESTEROL;MAY CAUSE MUSCLE PAIN-REPORT SIDE EFFECTS TO YOUR PROVIDER*    Azelastine-Fluticasone 137-50 MCG/ACT SUSP SPRAY 1 PUFF IN NOSTRIL(S) TWICE A DAY    budesonide (PULMICORT) 0 25 mg/2 mL nebulizer solution 0 5MG (1 RESPULE) IN NEBULIZER DAILY    Cetirizine HCl 10 MG CAPS TAKE ONE TABLET BY MOUTH DAILY FOR ALLERGIES    clopidogrel (PLAVIX) 75 mg tablet TAKE ONE TABLET BY MOUTH EVERY DAY TO PREVENT BLOOD CLOTS (SAME AS PLAVIX)    Coenzyme Q10-Vitamin E (QUNOL ULTRA COQ10) 100-150 MG-UNIT CAPS     cyclobenzaprine (FLEXERIL) 10 mg tablet Take 10 mg by mouth 3 (three) times a day as needed for muscle spasms    cyclobenzaprine (FLEXERIL) 10 mg tablet TAKE ONE TABLET BY MOUTH EVERY DAY AS NEEDED FOR MUSCLE SPASMS    donepezil (ARICEPT) 10 mg tablet Take 10 mg by mouth daily at bedtime    donepezil (ARICEPT) 10 mg tablet TAKE ONE TABLET BY MOUTH EVERY DAY FOR MEMORY    hydrochlorothiazide (MICROZIDE) 12 5 mg capsule TAKE ONE TABLET BY MOUTH EVERY DAY FOR BLOOD PRESSURE    isosorbide dinitrate (ISORDIL) 30 mg tablet TAKE ONE TABLET BY MOUTH DAILY FOR HEART AND/OR ANGINA    isosorbide mononitrate (IMDUR) 30 mg 24 hr tablet     ketoconazole (NIZORAL) 2 % cream APPLY SMALL AMOUNT TOPICALLY TWICE A DAY    METOPROLOL SUCCINATE ER PO TAKE ONE-HALF TABLET BY MOUTH DAILY FOR BLOOD PRESSURE/HEART    naproxen (NAPROSYN) 500 mg tablet Take 500 mg by mouth 2 (two) times a day with meals    naproxen (NAPROSYN) 500 mg tablet TAKE ONE TABLET BY MOUTH TWICE A DAY AS NEEDED FOR PAIN OR INFLAMMATION - TAKE WITH FOOD OR MILK    nitroglycerin (NITROSTAT) 0 4 mg SL tablet     Omega-3 Fatty Acids (SALMON OIL-1000 PO)     pantoprazole (PROTONIX) 20 mg tablet TAKE ONE TABLET BY MOUTH DAILY FOR STOMACH OR REFLUX    sertraline (ZOLOFT) 100 mg tablet TAKE ONE-HALF TABLET BY MOUTH EVERY DAY FOR MOOD     No current facility-administered medications on file prior to visit          No Known Allergies    Physical Exam    /82   Pulse (!) 52   Ht 6' 3" (1 905 m)   Wt 122 kg (270 lb)   BMI 33 75 kg/m²     Constitutional: normal, well developed, well nourished, alert, in no distress and non-toxic and no overt pain behavior  and overweight  Eyes: anicteric  HEENT: grossly intact  Neck: supple, symmetric, trachea midline and no masses   Pulmonary:even and unlabored  Cardiovascular:No edema or pitting edema present  Skin:Normal without rashes or lesions and well hydrated  Psychiatric:Mood and affect appropriate  Neurologic:Cranial Nerves II-XII grossly intact  Musculoskeletal:normal, decreased range of motion cervical spine decreased range of motion in shoulder right abduction with right shoulder pain on abduction and external rotation, deep tendon reflexes are 2+ symmetrical left upper limb diminished right triceps 1+, no focal motor deficit appreciated left upper limb no motor deficit appreciated the right upper limb aside from 3-5 strength right triceps, deep tendon reflexes are diminished in the bilateral lower limbs but symmetrical, positive pain to palpation the right PSIS positive Herber's maneuver on the right positive pelvic tilt test on the right negative bilateral straight leg raising positive pain with lumbar extension    Imaging  LEFT SHOULDER     INDICATION:   M25 512: Pain in left shoulder      COMPARISON:  None     VIEWS:  XR SHOULDER 2+ VW LEFT         FINDINGS:     There is no acute fracture or dislocation      Left glenohumeral and left AC mild degenerative joints      No lytic or blastic lesions are seen      Soft tissues are unremarkable      IMPRESSION:     No acute osseous abnormality  LUMBAR SPINE     INDICATION:   M54 5: Low back pain      COMPARISON:  None     VIEWS:  XR SPINE LUMBAR MINIMUM 4 VIEWS NON INJURY        FINDINGS:     There is no evidence of acute fracture or destructive osseous lesion      Alignment is unremarkable      Mild degenerative disc disease L2-3  Facet degenerative changes are seen particularly L4-5 and L5-S1  Transitional lumbosacral junction    Elongated left L5 transverse process articulates with the sacral base      The pedicles appear intact      Soft tissues are unremarkable      IMPRESSION:     No acute osseous abnormality        Degenerative changes as described        CERVICAL SPINE     INDICATION:  M54 2: Cervicalgia      COMPARISON:  None     VIEWS:  XR SPINE CERVICAL COMPLETE 4 OR 5 VW NON INJURY   Images: 6     FINDINGS:     No evidence of fracture       Straightening of the cervical lordosis with mild reversal at the C5-6 level  Mild anterolisthesis C4 on C5      Moderately advanced disc space narrowing is seen at C5-6 and C6-7 with small osteophytes       Moderate foraminal narrowing seen on the left at C3-4, C5-6 and C6-7  Apparent severe neuroforaminal narrowing on the right at C3-4, C4-5 and C5-6  Multilevel facet arthropathy most pronounced at C3-4      The prevertebral soft tissues are within normal limits        The lung apices are clear      IMPRESSION:     No acute osseous abnormality      Degenerative changes as above  I have personally reviewed pertinent films in PACS

## 2020-01-16 ENCOUNTER — HOSPITAL ENCOUNTER (OUTPATIENT)
Dept: RADIOLOGY | Facility: CLINIC | Age: 65
Discharge: HOME | End: 2020-01-16
Attending: ANESTHESIOLOGY | Admitting: ANESTHESIOLOGY
Payer: OTHER GOVERNMENT

## 2020-01-16 VITALS
HEART RATE: 58 BPM | DIASTOLIC BLOOD PRESSURE: 74 MMHG | OXYGEN SATURATION: 98 % | TEMPERATURE: 98.2 F | SYSTOLIC BLOOD PRESSURE: 126 MMHG | RESPIRATION RATE: 20 BRPM

## 2020-01-16 DIAGNOSIS — M46.1 SACROILIITIS (HCC): ICD-10-CM

## 2020-01-16 PROCEDURE — 27096 INJECT SACROILIAC JOINT: CPT | Performed by: ANESTHESIOLOGY

## 2020-01-16 RX ORDER — METHYLPREDNISOLONE ACETATE 80 MG/ML
80 INJECTION, SUSPENSION INTRA-ARTICULAR; INTRALESIONAL; INTRAMUSCULAR; PARENTERAL; SOFT TISSUE ONCE
Status: COMPLETED | OUTPATIENT
Start: 2020-01-16 | End: 2020-01-16

## 2020-01-16 RX ORDER — LIDOCAINE HYDROCHLORIDE 10 MG/ML
5 INJECTION, SOLUTION EPIDURAL; INFILTRATION; INTRACAUDAL; PERINEURAL ONCE
Status: COMPLETED | OUTPATIENT
Start: 2020-01-16 | End: 2020-01-16

## 2020-01-16 RX ADMIN — LIDOCAINE HYDROCHLORIDE 2 ML: 20 INJECTION, SOLUTION EPIDURAL; INFILTRATION; INTRACAUDAL at 13:12

## 2020-01-16 RX ADMIN — METHYLPREDNISOLONE ACETATE 80 MG: 80 INJECTION, SUSPENSION INTRA-ARTICULAR; INTRALESIONAL; INTRAMUSCULAR; SOFT TISSUE at 13:12

## 2020-01-16 RX ADMIN — LIDOCAINE HYDROCHLORIDE 3 ML: 10 INJECTION, SOLUTION EPIDURAL; INFILTRATION; INTRACAUDAL; PERINEURAL at 13:09

## 2020-01-16 RX ADMIN — IOHEXOL 1 ML: 300 INJECTION, SOLUTION INTRAVENOUS at 13:11

## 2020-01-16 NOTE — DISCHARGE INSTRUCTIONS
Steroid Joint Injection   WHAT YOU NEED TO KNOW:   A steroid joint injection is a procedure to inject steroid medicine into a joint  Steroid medicine decreases pain and inflammation  The injection may also contain an anesthetic (numbing medicine) to decrease pain  It may be done to treat conditions such as arthritis, gout, or carpal tunnel syndrome  The injections may be given in your knee, ankle, shoulder, elbow, wrist, ankle or sacroiliac joint  1  Do not apply heat to any area that is numb  If you have discomfort or soreness at the injection site, you may apply ice today, 20 minutes on and 20 minutes off  Tomorrow you may use ice or warm, moist heat  Do not apply ice or heat directly to the skin  2  You may have an increase or change in the discomfort for 36-48 hours after your treatment  Apply ice and continue with any pain medicine you have been prescribed  3  Do not do anything strenuous today  You may shower, but no tub baths or hot tubs today  You may resume your normal activities tomorrow, but do not overdo it  Resume normal activities slowly when you are feeling better  4  If you experience redness, drainage or swelling at the injection site, or if you develop a fever above 100 degrees, please call [facility] at [phone] or go to the Emergency Room  5  Continue to take all routine medicines prescribed by your primary care physician unless otherwise instructed by our staff  Most blood thinners should be started again according to your regularly scheduled dosing  If you have any questions, please give our office a call  If you have a problem specifically related to your procedure, please call our office at [phone]  Problems not related to your procedure should be directed to your primary care physician

## 2020-01-16 NOTE — H&P
History of Present Illness: The patient is a 60 y o  male who presents with complaints of low back pain        Patient Active Problem List   Diagnosis    Cervical spondylosis without myelopathy    Lumbar degenerative disc disease    Inflammation of right [address]ro[address]ac joint ([facility] [address]    Shoulder impingement syndrome, left    Osteoarthritis of glenohumeral joint, left       Past Medical History:   Diagnosis Date    Alzheimer disease ([facility] [address] )     Arthritis        Past Surgical History:   Procedure Laterality Date    CORONARY ANGIOPLASTY WITH STENT PLACEMENT Left     KNEE SURGERY Right 05/2017    replacement    SHOULDER SURGERY Right 11/2017    replacement         Current Outpatient Medications:     aspirin (ASPIRIN 81) 81 mg EC tablet, , Disp: , Rfl:     atorvastatin (LIPITOR) 80 mg tablet, TAKE ONE-HALF TABLET BY MOUTH EVERY EVENING TO LOWER CHOLESTEROL;MAY CAUSE MUSCLE PAIN-REPORT SIDE EFFECTS TO YOUR PROVIDER*, Disp: , Rfl:     Azelastine-Fluticasone 137-50 MCG/ACT SUSP, SPRAY 1 PUFF IN NOSTRIL(S) TWICE A DAY, Disp: , Rfl:     budesonide (PULMICORT) 0 25 mg/2 mL nebulizer solution, 0 5MG (1 RESPULE) IN NEBULIZER DAILY, Disp: , Rfl:     Cetirizine HCl 10 MG CAPS, TAKE ONE TABLET BY MOUTH DAILY FOR ALLERGIES, Disp: , Rfl:     clopidogrel (PLAVIX) 75 mg tablet, TAKE ONE TABLET BY MOUTH EVERY DAY TO PREVENT BLOOD CLOTS (SAME AS PLAVIX), Disp: , Rfl:     Coenzyme Q10-Vitamin E (QUNOL ULTRA COQ10) 100-150 MG-UNIT CAPS, , Disp: , Rfl:     cyclobenzaprine (FLEXERIL) 10 mg tablet, Take 10 mg by mouth 3 (three) times a day as needed for muscle spasms, Disp: , Rfl:     cyclobenzaprine (FLEXERIL) 10 mg tablet, TAKE ONE TABLET BY MOUTH EVERY DAY AS NEEDED FOR MUSCLE SPASMS, Disp: , Rfl:     donepezil (ARICEPT) 10 mg tablet, Take 10 mg by mouth daily at bedtime, Disp: , Rfl:     donepezil (ARICEPT) 10 mg tablet, TAKE ONE TABLET BY MOUTH EVERY DAY FOR MEMORY, Disp: , Rfl:     hydrochlorothiazide (MICROZIDE) 12 5 mg capsule, TAKE ONE TABLET BY MOUTH EVERY DAY FOR BLOOD PRESSURE, Disp: , Rfl:     isosorbide dinitrate (ISORDIL) 30 mg tablet, TAKE ONE TABLET BY MOUTH DAILY FOR HEART AND/OR ANGINA, Disp: , Rfl:     isosorbide mononitrate (IMDUR) 30 mg 24 hr tablet, , Disp: , Rfl:     ketoconazole (NIZORAL) 2 % cream, APPLY SMALL AMOUNT TOPICALLY TWICE A DAY, Disp: , Rfl:     METOPROLOL SUCCINATE ER PO, TAKE ONE-HALF TABLET BY MOUTH DAILY FOR BLOOD PRESSURE/HEART, Disp: , Rfl:     naproxen (NAPROSYN) 500 mg tablet, Take 500 mg by mouth 2 (two) times a day with meals, Disp: , Rfl:     naproxen (NAPROSYN) 500 mg tablet, TAKE ONE TABLET BY MOUTH TWICE A DAY AS NEEDED FOR PAIN OR INFLAMMATION - TAKE WITH FOOD OR MILK, Disp: , Rfl:     nitroglycerin (NITROSTAT) 0 4 mg SL tablet, , Disp: , Rfl:     Omega-3 Fatty Acids (SALMON OIL-1000 PO), , Disp: , Rfl:     pantoprazole (PROTONIX) 20 mg tablet, TAKE ONE TABLET BY MOUTH DAILY FOR STOMACH OR REFLUX, Disp: , Rfl:     sertraline (ZOLOFT) 100 mg tablet, TAKE ONE-HALF TABLET BY MOUTH EVERY DAY FOR MOOD, Disp: , Rfl:     Current Facility-Administered Medications:     iohexol (OMNIPAQUE) 300 mg/mL injection 50 mL, 50 mL, Intra-articular, Once, Osiel Painter DO    lidocaine (PF) (XYLOCAINE-MPF) 1 % injection 5 mL, 5 mL, Other, Once, Osiel Painter DO    lidocaine (PF) (XYLOCAINE-MPF) 2 % injection 5 mL, 5 mL, Intra-articular, Once, Osiel Painter DO    methylPREDNISolone acetate (DEPO-MEDROL) injection 80 mg, 80 mg, Intra-articular, Once, Osiel Painter DO    No Known Allergies    Physical Exam:   General: Awake, Alert, Oriented x 3, Mood and affect appropriate  Respiratory: Respirations even and unlabored  Cardiovascular: Peripheral pulses intact; no edema  Musculoskeletal Exam:  Decreased range of lumbar spine    ASA Score: II         Assessment:   1   Sacroiliitis ([facility] [address] ) - Right        Plan: right Sacroiliac joint injection

## 2020-01-23 ENCOUNTER — TELEPHONE (OUTPATIENT)
Dept: PAIN MEDICINE | Facility: CLINIC | Age: 65
End: 2020-01-23

## 2020-01-23 NOTE — TELEPHONE ENCOUNTER
Dr Sung Dobson Procedure F/u (S/p RT SIJ   F/u not at this time)       Spoke with pt who states that he was feeling better but today his pain is a 3 aqsked if there was any relief and he states that it is hard to say at this point in time advised that I would forward this to doctor and see if there was any further advise      No follow up at this time

## 2020-01-24 ENCOUNTER — HOSPITAL ENCOUNTER (OUTPATIENT)
Dept: MRI IMAGING | Facility: HOSPITAL | Age: 65
Discharge: HOME | End: 2020-01-24
Attending: ANESTHESIOLOGY
Payer: OTHER GOVERNMENT

## 2020-01-24 DIAGNOSIS — M54.12 CERVICAL RADICULOPATHY: ICD-10-CM

## 2020-01-24 PROCEDURE — 72141 MRI NECK SPINE W/O DYE: CPT

## 2020-01-24 NOTE — TELEPHONE ENCOUNTER
Patient is leaving for [place] on Feb 3 and was hoping to get into see you before then  There are no opening for you now till Feb 5th      Please advise

## 2020-01-24 NOTE — TELEPHONE ENCOUNTER
First one is 02/06/2020  I could speak with him on Monday and see where I could fit him in      Please advise

## 2020-01-30 ENCOUNTER — TELEPHONE (OUTPATIENT)
Dept: RADIOLOGY | Facility: CLINIC | Age: 65
End: 2020-01-30

## 2020-01-30 ENCOUNTER — OFFICE VISIT (OUTPATIENT)
Dept: PAIN MEDICINE | Facility: CLINIC | Age: 65
End: 2020-01-30
Payer: OTHER GOVERNMENT

## 2020-01-30 VITALS
BODY MASS INDEX: 33.69 KG/M2 | HEART RATE: 56 BPM | DIASTOLIC BLOOD PRESSURE: 78 MMHG | SYSTOLIC BLOOD PRESSURE: 130 MMHG | HEIGHT: 75 IN | WEIGHT: 271 LBS

## 2020-01-30 DIAGNOSIS — M47.812 CERVICAL SPONDYLOSIS WITHOUT MYELOPATHY: ICD-10-CM

## 2020-01-30 DIAGNOSIS — M46.1 SACROILIITIS (HCC): ICD-10-CM

## 2020-01-30 DIAGNOSIS — M48.02 CERVICAL SPINAL STENOSIS: ICD-10-CM

## 2020-01-30 DIAGNOSIS — G89.4 CHRONIC PAIN SYNDROME: Primary | ICD-10-CM

## 2020-01-30 DIAGNOSIS — M54.2 NECK PAIN: ICD-10-CM

## 2020-01-30 DIAGNOSIS — M54.12 CERVICAL RADICULOPATHY: ICD-10-CM

## 2020-01-30 PROCEDURE — 99214 OFFICE O/P EST MOD 30 MIN: CPT | Performed by: NURSE PRACTITIONER

## 2020-01-30 RX ORDER — PREDNISONE 10 MG/1
TABLET ORAL
Qty: 36 TABLET | Refills: 0 | Status: SHIPPED | OUTPATIENT
Start: 2020-01-30 | End: 2020-03-05

## 2020-01-30 NOTE — PROGRESS NOTES
Assessment:  1  Chronic pain syndrome    2  Neck pain    3  Cervical radiculopathy    4  Sacroiliitis (HCC)    5  Cervical spondylosis without myelopathy    6  Cervical spinal stenosis        Plan:  While the patient is wife were in the office today, I did have a thorough conversation with him regarding his chronic pain syndrome, symptoms, treatment and plan options  With regards to his low back and sacroiliac joint pain, for now, we decided to hold off on any repeat injections and try to concentrate on his cervical spine since that seems to be his biggest issue  The patient was agreeable and verbalized an understanding  I did thoroughly review the results of the patient's most recent cervical spine MRI with the patient and his wife and explained him that at this point I do feel that a surgical consultation would be appropriate and in the meantime we could also try a cervical epidural steroid injection to see if that would provide some relief as well  However, the patient is on Plavix and aspirin and is leaving for vacation in a few days  So to try to help with his pain in the more immediate presents, I am going to prescribe a titrating dose of oral prednisone  I discussed with the patient that at this point time since I feel that there is a significant inflammatory component to their pain symptoms, that they would benefit from a titrating dose of oral steroids over the next 7 days  I advised the patient that while on the steroids, they should not take any other oral NSAIDs except for acetaminophen or Tylenol until they have completed the steroid taper  I also advised them that once they have completed the steroid taper, they are to give our office a follow up phone call to let us know how they are doing and if there is any improvement  The patient was agreeable and verbalized an understanding       We are also going to proceed with scheduling him for a cervical epidural steroid injection with Dr Tenna Baumgarten as well   I did explain to the patient and his wife that depending on the results of the injection it may need to be repeated  The patient was agreeable and verbalized an understanding  Complete risks and benefits including bleeding, infection, tissue reaction, nerve injury and allergic reaction were discussed  The approach was demonstrated using models and literature was provided  Verbal and written consent was obtained  However, I did advise the patient that for now until he is scheduled by our office he is to continue to take his Plavix and aspirin as prescribed by his physician until he hears otherwise from our office  The patient was agreeable and verbalized an understanding  We did briefly discuss the options of neuropathic medications, however, with his underlying early Alzheimer's and memory issues, and the fact that he does not want to taking more medications, we decided was in his best interest to hold off medication options  The patient is schedule a follow-up office visit 3-4 weeks after his injections and at that point time we would re-evaluate his pain symptoms and treatment plan options  The patient was agreeable and verbalized an understanding  History of Present Illness: The patient is a 60 y o  male last seen on 1/16/20 who presents for a follow up office visit in regards to chronic pain syndrome secondary to sacroiliitis, cervical spondylosis and stenosis with radiculopathy  The patient currently reports that since his last office visit he feels his pain symptoms have remained the same as he continues with the right SI joint pain even though he is status post a right sacroiliac joint injection on January 16, 2020 with Dr Corky Arreaga    He reports that at best the injection provided relief for a day or so and although he continues with the right-sided SI joint pain, his biggest issue continues to be the worsening neck and right greater than left upper extremity radicular pain and symptoms  Since his office visit he did proceed with an updated MRI of the cervical spine which did show significant degenerative changes with several levels of moderate to severe foraminal stenosis and moderate to severe central stenosis with evidence of spinal cord contact at C4-C5  The patient presents today with his wife to discuss his treatment plan options  I have personally reviewed and/or updated the patient's past medical history, past surgical history, family history, social history, current medications, allergies, and vital signs today  Review of Systems:    Review of Systems   Respiratory: Positive for shortness of breath  Cardiovascular: Positive for chest pain  Gastrointestinal: Negative for constipation, diarrhea, nausea and vomiting  Musculoskeletal: Positive for gait problem  Negative for arthralgias, joint swelling (joint stiffness) and myalgias  Skin: Negative for rash  Neurological: Positive for dizziness and weakness  Negative for seizures  All other systems reviewed and are negative          Past Medical History:   Diagnosis Date    Alzheimer disease ([facility] [address] )     Arthritis        Past Surgical History:   Procedure Laterality Date    CORONARY ANGIOPLASTY WITH STENT PLACEMENT Left     KNEE SURGERY Right 05/2017    replacement    SHOULDER SURGERY Right 11/2017    replacement       Family History   Problem Relation Age of Onset    Colon cancer Mother     Dementia Mother     Prostate cancer Father     Heart disease Father        Social History     Occupational History    Not on file   Tobacco Use    Smoking status: Former Smoker    Smokeless tobacco: Never Used   Substance and Sexual Activity    Alcohol use: Yes     Binge frequency: Monthly    Drug use: Not on file    Sexual activity: Not on file         Current Outpatient Medications:     aspirin (ASPIRIN 81) 81 mg EC tablet, , Disp: , Rfl:     atorvastatin (LIPITOR) 80 mg tablet, TAKE ONE-HALF TABLET BY MOUTH EVERY EVENING TO LOWER CHOLESTEROL;MAY CAUSE MUSCLE PAIN-REPORT SIDE EFFECTS TO YOUR PROVIDER*, Disp: , Rfl:     Azelastine-Fluticasone 137-50 MCG/ACT SUSP, SPRAY 1 PUFF IN NOSTRIL(S) TWICE A DAY, Disp: , Rfl:     budesonide (PULMICORT) 0 25 mg/2 mL nebulizer solution, 0 5MG (1 RESPULE) IN NEBULIZER DAILY, Disp: , Rfl:     Cetirizine HCl 10 MG CAPS, TAKE ONE TABLET BY MOUTH DAILY FOR ALLERGIES, Disp: , Rfl:     clopidogrel (PLAVIX) 75 mg tablet, TAKE ONE TABLET BY MOUTH EVERY DAY TO PREVENT BLOOD CLOTS (SAME AS PLAVIX), Disp: , Rfl:     Coenzyme Q10-Vitamin E (QUNOL ULTRA COQ10) 100-150 MG-UNIT CAPS, , Disp: , Rfl:     cyclobenzaprine (FLEXERIL) 10 mg tablet, Take 10 mg by mouth 3 (three) times a day as needed for muscle spasms, Disp: , Rfl:     donepezil (ARICEPT) 10 mg tablet, Take 10 mg by mouth daily at bedtime, Disp: , Rfl:     hydrochlorothiazide (MICROZIDE) 12 5 mg capsule, TAKE ONE TABLET BY MOUTH EVERY DAY FOR BLOOD PRESSURE, Disp: , Rfl:     isosorbide dinitrate (ISORDIL) 30 mg tablet, TAKE ONE TABLET BY MOUTH DAILY FOR HEART AND/OR ANGINA, Disp: , Rfl:     ketoconazole (NIZORAL) 2 % cream, APPLY SMALL AMOUNT TOPICALLY TWICE A DAY, Disp: , Rfl:     METOPROLOL SUCCINATE ER PO, TAKE ONE-HALF TABLET BY MOUTH DAILY FOR BLOOD PRESSURE/HEART, Disp: , Rfl:     naproxen (NAPROSYN) 500 mg tablet, TAKE ONE TABLET BY MOUTH TWICE A DAY AS NEEDED FOR PAIN OR INFLAMMATION - TAKE WITH FOOD OR MILK, Disp: , Rfl:     nitroglycerin (NITROSTAT) 0 4 mg SL tablet, , Disp: , Rfl:     Omega-3 Fatty Acids (SALMON OIL-1000 PO), , Disp: , Rfl:     pantoprazole (PROTONIX) 20 mg tablet, TAKE ONE TABLET BY MOUTH DAILY FOR STOMACH OR REFLUX, Disp: , Rfl:     sertraline (ZOLOFT) 100 mg tablet, TAKE ONE-HALF TABLET BY MOUTH EVERY DAY FOR MOOD, Disp: , Rfl:     predniSONE 10 mg tablet, Take 2 PO QID on the first day and then decrease by 1 pill daily until gone  Call once completed  , Disp: 36 tablet, Rfl: 0    No Known Allergies    Physical Exam:    /78 (BP Location: Left arm, Patient Position: Sitting, Cuff Size: Large)   Pulse 56   Ht 6' 3" (1 905 m)   Wt 123 kg (271 lb)   BMI 33 87 kg/m²     Constitutional:normal, well developed, well nourished, alert, in no distress and non-toxic and no overt pain behavior  and overweight  Eyes:anicteric  HEENT:grossly intact  Neck:supple, symmetric, trachea midline and no masses   Pulmonary:even and unlabored  Cardiovascular:No edema or pitting edema present  Skin:Normal without rashes or lesions and well hydrated  Psychiatric:Mood and affect appropriate  Neurologic:Cranial Nerves II-XII grossly intact  Musculoskeletal:The patient's gait was slightly antalgic, but steady without the use of any assistive devices        Imaging  FL spine and pain procedure    (Results Pending)   FL spine and pain procedure    (Results Pending)         Orders Placed This Encounter   Procedures    FL spine and pain procedure    FL spine and pain procedure

## 2020-01-30 NOTE — PATIENT INSTRUCTIONS
Epidural Steroid Injection   AMBULATORY CARE:   What you need to know about an epidural steroid injection (NICA):  An NICA is a procedure to inject steroid medicine into the epidural space  The epidural space is between your spinal cord and vertebrae  Steroids reduce inflammation and fluid buildup in your spine that may be causing pain  You may be given pain medicine along with the steroids  How to prepare for an NICA:  Your healthcare provider will talk to you about how to prepare for your procedure  He will tell you what medicines to take or not take on the day of your procedure  You may need to stop taking blood thinners or other medicines several days before your procedure  You may need to adjust any diabetes medicine you take on the day of your procedure  Steroid medicine can increase your blood sugar level  What will happen during an NICA:   · You will be given medicine to numb the procedure area  You will be awake for the procedure, but you will not feel pain  You may also be given medicine to help you relax during the procedure  Contrast liquid will be used to help your healthcare provider see the area better  Tell the healthcare provider if you have ever had an allergic reaction to contrast liquid  · Your healthcare provider may place the needle into your neck area, middle of your back, or tailbone area  He may inject the medicine next to the nerves that are causing your pain  He may instead inject the medicine into a larger area of the epidural space  This helps the medicine spread to more nerves  Your healthcare provider will use a fluoroscope to help guide the needle to the right place  A fluoroscope is a type of x-ray  After the procedure, a bandage will be placed over the injection site to prevent infection  Risks of an NICA:  You may have temporary or permanent nerve damage or paralysis  You may have bleeding or develop a serious infection, such as meningitis (swelling of the brain coverings)  An abscess may also develop  You may need surgery to fix the abscess  You may have a seizure, anxiety, or trouble sleeping  If you are a man, you may have temporary erectile dysfunction (not able to have an erection)  Care for your wound as directed: You may remove the bandage before you go to bed the day of your procedure  You may take a shower, but do not take a bath for at least 24 hours  Self-care:   · Do not drive,  use machines, or do strenuous activity for 24 hours after your procedure or as directed  · Continue other treatments  as directed  Steroid injections alone will not control your pain  The injections are meant to be used with other treatments, such as physical therapy  Seek care immediately if:   · Blood soaks through your bandage  · Your wound is red, swollen, or draining pus  · You have a fever or chills, severe back pain, and the procedure area is sensitive to the touch  · You have a seizure  · You have trouble moving your legs  · You have weakness or numbness in your legs  · You cannot control when you urinate or have a bowel movement  Contact your healthcare provider if:   · You have nausea or are vomiting  · Your face or neck is red for a few days and you feel warm  · You have more pain than you had before the procedure  · You have swelling in your hands or feet  · You have questions or concerns about your condition or care  Follow up with your healthcare provider as directed:  Write down your questions so you remember to ask them during your visits  © 2017 [employer] Information is for End User's use only and may not be sold, redistributed or otherwise used for commercial purposes  All illustrations and images included in CareNotes® are the copyrighted property of [employer] , [employer]  or [employer]  The above information is an  only  It is not intended as medical advice for individual conditions or treatments  Talk to your doctor, nurse or pharmacist before following any medical regimen to see if it is safe and effective for you

## 2020-01-30 NOTE — TELEPHONE ENCOUNTER
Faxed anticoag hold to [place] cardiologist Dr Bernardino Bennett (fax # [phone]) phone number [phone]  PLAVIX & ASA    Schedule DILEEP X2 the a 2-3 wk f/u post inj    Pt leaving 2/3/20 for [place] for 3 weeks

## 2020-01-31 ENCOUNTER — TELEPHONE (OUTPATIENT)
Dept: PAIN MEDICINE | Facility: CLINIC | Age: 65
End: 2020-01-31

## 2020-02-03 ENCOUNTER — OFFICE VISIT (OUTPATIENT)
Dept: OBGYN CLINIC | Facility: HOSPITAL | Age: 65
End: 2020-02-03
Payer: COMMERCIAL

## 2020-02-03 ENCOUNTER — HOSPITAL ENCOUNTER (OUTPATIENT)
Dept: RADIOLOGY | Facility: HOSPITAL | Age: 65
Discharge: HOME | End: 2020-02-03
Attending: ORTHOPAEDIC SURGERY
Payer: OTHER GOVERNMENT

## 2020-02-03 ENCOUNTER — APPOINTMENT (OUTPATIENT)
Dept: LAB | Facility: HOSPITAL | Age: 65
End: 2020-02-03
Payer: OTHER GOVERNMENT

## 2020-02-03 VITALS
DIASTOLIC BLOOD PRESSURE: 88 MMHG | HEIGHT: 75 IN | HEART RATE: 54 BPM | BODY MASS INDEX: 32.33 KG/M2 | SYSTOLIC BLOOD PRESSURE: 138 MMHG | WEIGHT: 260 LBS

## 2020-02-03 DIAGNOSIS — G89.29 CHRONIC RIGHT SHOULDER PAIN: ICD-10-CM

## 2020-02-03 DIAGNOSIS — Z96.611 S/P SHOULDER REPLACEMENT, RIGHT: ICD-10-CM

## 2020-02-03 DIAGNOSIS — M75.42 IMPINGEMENT SYNDROME OF LEFT SHOULDER: ICD-10-CM

## 2020-02-03 DIAGNOSIS — M25.511 RIGHT SHOULDER PAIN, UNSPECIFIED CHRONICITY: Primary | ICD-10-CM

## 2020-02-03 DIAGNOSIS — M25.511 RIGHT SHOULDER PAIN, UNSPECIFIED CHRONICITY: ICD-10-CM

## 2020-02-03 DIAGNOSIS — M25.511 CHRONIC RIGHT SHOULDER PAIN: Primary | ICD-10-CM

## 2020-02-03 DIAGNOSIS — M25.511 CHRONIC RIGHT SHOULDER PAIN: ICD-10-CM

## 2020-02-03 DIAGNOSIS — G89.29 CHRONIC RIGHT SHOULDER PAIN: Primary | ICD-10-CM

## 2020-02-03 LAB
BASOPHILS # BLD AUTO: 0.09 THOUSANDS/ΜL (ref 0–0.1)
BASOPHILS NFR BLD AUTO: 1 % (ref 0–1)
CRP SERPL QL: <3 MG/L
EOSINOPHIL # BLD AUTO: 0.28 THOUSAND/ΜL (ref 0–0.61)
EOSINOPHIL NFR BLD AUTO: 3 % (ref 0–6)
ERYTHROCYTE [DISTWIDTH] IN BLOOD BY AUTOMATED COUNT: 13.9 % (ref 11.6–15.1)
ERYTHROCYTE [SEDIMENTATION RATE] IN BLOOD: 2 MM/HOUR (ref 0–10)
HCT VFR BLD AUTO: 47.4 % (ref 36.5–49.3)
HGB BLD-MCNC: 15.1 G/DL (ref 12–17)
IMM GRANULOCYTES # BLD AUTO: 0.02 THOUSAND/UL (ref 0–0.2)
IMM GRANULOCYTES NFR BLD AUTO: 0 % (ref 0–2)
LYMPHOCYTES # BLD AUTO: 1.58 THOUSANDS/ΜL (ref 0.6–4.47)
LYMPHOCYTES NFR BLD AUTO: 18 % (ref 14–44)
MCH RBC QN AUTO: 28.3 PG (ref 26.8–34.3)
MCHC RBC AUTO-ENTMCNC: 31.9 G/DL (ref 31.4–37.4)
MCV RBC AUTO: 89 FL (ref 82–98)
MONOCYTES # BLD AUTO: 0.7 THOUSAND/ΜL (ref 0.17–1.22)
MONOCYTES NFR BLD AUTO: 8 % (ref 4–12)
NEUTROPHILS # BLD AUTO: 5.9 THOUSANDS/ΜL (ref 1.85–7.62)
NEUTS SEG NFR BLD AUTO: 70 % (ref 43–75)
NRBC BLD AUTO-RTO: 0 /100 WBCS
PLATELET # BLD AUTO: 238 THOUSANDS/UL (ref 149–390)
PMV BLD AUTO: 10.1 FL (ref 8.9–12.7)
RBC # BLD AUTO: 5.34 MILLION/UL (ref 3.88–5.62)
WBC # BLD AUTO: 8.57 THOUSAND/UL (ref 4.31–10.16)

## 2020-02-03 PROCEDURE — 73030 X-RAY EXAM OF SHOULDER: CPT

## 2020-02-03 PROCEDURE — 85025 COMPLETE CBC W/AUTO DIFF WBC: CPT

## 2020-02-03 PROCEDURE — 85652 RBC SED RATE AUTOMATED: CPT

## 2020-02-03 PROCEDURE — 99213 OFFICE O/P EST LOW 20 MIN: CPT | Performed by: ORTHOPAEDIC SURGERY

## 2020-02-03 PROCEDURE — 36415 COLL VENOUS BLD VENIPUNCTURE: CPT

## 2020-02-03 PROCEDURE — 86140 C-REACTIVE PROTEIN: CPT

## 2020-02-03 NOTE — PROGRESS NOTES
I personally examined the patient and reviewed the history provided  I agree with the note and the assessment and plan by Dr Carley Sagastume MD     Please refer to the documented HPI in the body of the note for details  Radiology: I have personally reviewed the following images and my read follows  Radiographs multiple views right shoulder show a stemmed total shoulder arthroplasty with no evidence of loosening, the humeral head does appear to be prominent in the humeral shaft  X-rays left shoulder multiple views show mild degenerative change with no acute bony injury    Assessment:    1) painful right total shoulder arthroplasty    2) left subacromial impingement syndrome with mild glenohumeral degenerative change    Plan:    1) a formal evaluation of a painful right total shoulder arthroplasty has been initiated today in the form of lab work as well as aspiration for synovasure and a CT arthrogram to evaluate the rotator cuff  The results of the studies will help me determine the next best step in treatment for the patient to treat the painful right total shoulder arthroplasty      2) patient will initiate physical therapy for his left subacromial impingement syndrome and further evaluation the form of an MRI scan may be required if symptoms persist, I do think we should do fully evaluate and treat the right shoulder before taking on the left shoulder and the patient and his family agree        Assessment  Diagnoses and all orders for this visit:    Chronic right shoulder pain - S/P shoulder replacement, right      Impingement syndrome of left shoulder        Discussion and Plan:  WBAT BL UE  Will order ESR, CRP, CBC, FL guided aspiration of the right shoulder with synovasure analysis and CT arthrogram of the right shoulder to evaluate for infectious/structural pathology that may be contributing to his symptoms  Tylenol as needed for pain relief  Will refer patient for PT for the left shoulder impingement syndrome  Will see patient back following completion of the above studies     Subjective:   Patient ID: Sidney Bentley is a 63 y o  male      Patient presents for evaluation of bilateral shoulder pain  Patient has a history of R TSA performed at an outside hospital in 2017  He states that he has had persistent pain since the procedure that is worse with overhead activities and did not resolve with previous physical therapy  He also has pain to the left shoulder and has been diagnosed with impingement syndrome of the left shoulder, his pain did respond to previous CSI          The following portions of the patient's history were reviewed and updated as appropriate: allergies, current medications, past family history, past medical history, past social history, past surgical history and problem list     Review of Systems   Constitutional: Negative for chills  HENT: Negative for hearing loss  Eyes: Negative for discharge  Respiratory: Negative for cough  Cardiovascular: Negative for chest pain  Skin: Negative for pallor  Neurological: Negative for numbness  Psychiatric/Behavioral: Negative for agitation         Objective:  /88   Pulse (!) 54   Ht 6' 3" (1 905 m)   Wt 118 kg (260 lb) Comment: verbal  BMI 32 50 kg/m²       Right Shoulder Exam     Range of Motion   Active abduction: 120   Passive abduction: 160   External rotation: 60   Forward flexion: 150     Muscle Strength   Abduction: 5/5   External rotation: 5/5   Supraspinatus: 5/5   Subscapularis: 4/5   Biceps: 5/5     Tests   Warrne test: positive  Impingement: positive  Drop arm: negative    Other   Erythema: absent  Sensation: normal  Pulse: present      Left Shoulder Exam     Range of Motion   Active abduction: 150   Passive abduction: 160   External rotation: 60   Forward flexion: 160     Muscle Strength   Abduction: 5/5   Internal rotation: 5/5   External rotation: 5/5   Supraspinatus: 5/5   Subscapularis: 5/5   Biceps: 5/5 Tests   Warren test: positive  Impingement: positive  Drop arm: negative    Other   Erythema: absent  Sensation: normal  Pulse: present             Physical Exam   Constitutional: He is oriented to person, place, and time  He appears well-developed and well-nourished  HENT:   Head: Normocephalic and atraumatic  Eyes: Pupils are equal, round, and reactive to light  EOM are normal    Cardiovascular: Normal rate and intact distal pulses  Pulmonary/Chest: Effort normal  No respiratory distress  Neurological: He is alert and oriented to person, place, and time  Skin: Skin is warm and dry  Psychiatric: He has a normal mood and affect  His behavior is normal          I have personally reviewed pertinent films in PACS and my interpretation is as follows      XR of the bilateral shoulders show R shoulder implant without obvious evidence of loosening    L shoulder demonstrates mild degenerative changes of the glenohumeral joint

## 2020-02-04 NOTE — TELEPHONE ENCOUNTER
Received ok to hold asa x 6 days and plavix x 7 days prior to procedure signed by Dr Gilford Gash on 2/3/2020

## 2020-02-07 NOTE — TELEPHONE ENCOUNTER
S/w pt, advised this office received an ok to hold plavix and asa, calling to schedule procedure  Pt stated that this is not a good time  He is in a hot tub in [place]  Provided office hours  Pt stated that he will call back

## 2020-02-11 ENCOUNTER — TELEPHONE (OUTPATIENT)
Dept: OBGYN CLINIC | Facility: HOSPITAL | Age: 65
End: 2020-02-11

## 2020-02-11 NOTE — TELEPHONE ENCOUNTER
Spoke to patient  He stated that he has called a few times and every time he is on the phone with someone, it cuts out and hangs up  Advised him that this message was sent over to our surgery coordinators for someone to reach back out to him in regards to scheduling       Please follow up when available, thank you

## 2020-02-11 NOTE — TELEPHONE ENCOUNTER
Patient never scheduled his lab work or CT while he was in the office  I am not sure why he hung up on me but he said Dr King Russo never said he need to get any studies or therapy  Note is very straight forward as far as his plan  Right shoulder needs aspiration and CT arthrogram ([facility])    Left shoulder needs PT scheduled  He has [employer] so I am not sure if he has to go somewhere else or if we can obtain within [facility]    Can someone reach out and make sure he gets everything that was ordered?

## 2020-02-11 NOTE — TELEPHONE ENCOUNTER
Patient is scheduled for the testing all that's needed is the physical therapy to be scheduled  Forwarding to Karley Cruz so that she is aware!

## 2020-02-11 NOTE — TELEPHONE ENCOUNTER
Patient of Dr Jerrica Stewart    Patient called stating he is returning a call from Dr Manan Cabral nurse about scheduling    Patient is unaware what he is supposed to be scheduling       I see an order for PT- however patient is unsure if there is something else he needs to schedule    Patient Phone # [phone]

## 2020-02-24 ENCOUNTER — TRANSCRIBE ORDERS (OUTPATIENT)
Dept: RADIOLOGY | Facility: HOSPITAL | Age: 65
End: 2020-02-24

## 2020-02-24 ENCOUNTER — HOSPITAL ENCOUNTER (OUTPATIENT)
Dept: RADIOLOGY | Facility: HOSPITAL | Age: 65
Discharge: HOME | End: 2020-02-24
Payer: OTHER GOVERNMENT

## 2020-02-24 DIAGNOSIS — Z96.611 S/P SHOULDER REPLACEMENT, RIGHT: ICD-10-CM

## 2020-02-24 DIAGNOSIS — M25.511 CHRONIC RIGHT SHOULDER PAIN: ICD-10-CM

## 2020-02-24 DIAGNOSIS — G89.29 CHRONIC RIGHT SHOULDER PAIN: ICD-10-CM

## 2020-02-24 DIAGNOSIS — M25.511 RIGHT SHOULDER PAIN, UNSPECIFIED CHRONICITY: ICD-10-CM

## 2020-02-24 PROCEDURE — 77002 NEEDLE LOCALIZATION BY XRAY: CPT

## 2020-02-24 PROCEDURE — 23350 INJECTION FOR SHOULDER X-RAY: CPT

## 2020-02-24 PROCEDURE — 73200 CT UPPER EXTREMITY W/O DYE: CPT

## 2020-02-24 RX ORDER — LIDOCAINE HYDROCHLORIDE 10 MG/ML
10 INJECTION, SOLUTION EPIDURAL; INFILTRATION; INTRACAUDAL; PERINEURAL
Status: COMPLETED | OUTPATIENT
Start: 2020-02-24 | End: 2020-02-24

## 2020-02-24 RX ORDER — SODIUM CHLORIDE 9 MG/ML
10 INJECTION INTRAVENOUS
Status: COMPLETED | OUTPATIENT
Start: 2020-02-24 | End: 2020-02-24

## 2020-02-24 RX ADMIN — IOHEXOL 5 ML: 300 INJECTION, SOLUTION INTRAVENOUS at 16:10

## 2020-02-24 RX ADMIN — SODIUM CHLORIDE 6 ML: 9 INJECTION, SOLUTION INTRAMUSCULAR; INTRAVENOUS; SUBCUTANEOUS at 16:10

## 2020-02-24 RX ADMIN — LIDOCAINE HYDROCHLORIDE 6 ML: 10 INJECTION, SOLUTION EPIDURAL; INFILTRATION; INTRACAUDAL; PERINEURAL at 16:10

## 2020-02-27 ENCOUNTER — TELEPHONE (OUTPATIENT)
Dept: OBGYN CLINIC | Facility: HOSPITAL | Age: 65
End: 2020-02-27

## 2020-02-27 ENCOUNTER — EVALUATION (OUTPATIENT)
Dept: PHYSICAL THERAPY | Facility: CLINIC | Age: 65
End: 2020-02-27

## 2020-02-27 DIAGNOSIS — M75.42 IMPINGEMENT SYNDROME OF LEFT SHOULDER: Primary | ICD-10-CM

## 2020-02-27 DIAGNOSIS — G89.29 CHRONIC LEFT SHOULDER PAIN: ICD-10-CM

## 2020-02-27 DIAGNOSIS — M25.512 CHRONIC LEFT SHOULDER PAIN: ICD-10-CM

## 2020-02-27 NOTE — PROGRESS NOTES
PT Evaluation     Today's date: 2020  Patient name: Mini Quiroz  : 1955  MRN: [id number]  Referring provider: Albino Niño MD  Dx:   Encounter Diagnosis     ICD-10-CM    1  Chronic left shoulder pain M25 512     G89 29    2  Impingement syndrome of left shoulder M75 42 Ambulatory referral to Physical Therapy                  Assessment  Assessment details: Mini Quiroz is a 63 y o  male who presents with chronic pain, decreased strength, decreased ROM, decreased joint mobility and postural  dysfunction  Due to these impairments, patient has difficulty performing ADL's, recreational activities, engaging in social activities, lifting/carrying, transfers, reaching  Patient's clinical presentation is consistent with their referring diagnosis of Chronic left shoulder pain, Impingement syndrome of left shoulder  Patient has been educated in home exercise program and plan of care  Patient would benefit from skilled physical therapy services to address their aforementioned functional limitations and progress towards prior level of function and independence with home exercise program      Impairments: abnormal muscle firing, abnormal or restricted ROM, activity intolerance, impaired physical strength, lacks appropriate home exercise program, pain with function, weight-bearing intolerance, poor posture  and poor body mechanics  Barriers to therapy: Pt with chronic c/s, l/s, B shoulder pain, B knee pain  Pt also has a hx of chronic HA, right TSA, and right TKA  Understanding of Dx/Px/POC: good   Prognosis: fair    Goals  Short Term Goals: Target Date 30 days  1  Initiate and advance HEP  2  Decrease c/o pain to 4/10 at worst  3  Increase ROM to WNL  4  Increase strength by 1 grade    Long Term Goals: Target Date 60 days  1  Indep with HEP  2  Abolish c/o pain   3  Increase strength to [facility]  4   Pt will be able to perform all ADL's      Plan  Patient would benefit from: skilled PT  Planned modality interventions: cryotherapy  Planned therapy interventions: joint mobilization, manual therapy, patient education, postural training, activity modification, abdominal trunk stabilization, body mechanics training, flexibility, functional ROM exercises, graded exercise, home exercise program, neuromuscular re-education, strengthening, stretching, therapeutic activities, therapeutic exercise, motor coordination training, muscle pump exercises, gait training, balance/weight bearing training and ADL training  Frequency: 2x week  Duration in weeks: 8  Plan of Care beginning date: 2020  Plan of Care expiration date: 2020  Treatment plan discussed with: patient        Subjective Evaluation    History of Present Illness  Mechanism of injury: Pt notes that he has had B shoulder pain for many years  He notes that he has had a right TSA in 2017 and has had pain in that shld since that point  He notes that his left shoulder has been bothersome as well  Since that time  No specific injury  He notes that he is always tired from his dementia meds, but notes it's hard to sleep secondary to having pain in both shoulders with sleeping  Pt notes that ortho believes he has impingment in the left shoulder and is currently doing blood work on the right shoulder to make sure there is no infection in that one  Pt is able to reach behind his back and behind his head with the left arm but has difficulty with quick motions, and reaching overhead  Pt also denies any n/t in B LE and UE  Pain  Current pain ratin  At best pain rating: 3  At worst pain ratin  Location: left shoulder  Quality: dull ache, discomfort and sharp    Patient Goals  Patient goals for therapy: decreased pain, increased motion, independence with ADLs/IADLs, increased strength and return to sport/leisure activities          Objective     Static Posture   General Observations  Guarded  Head  Forward  Shoulders  Depressed and rounded      Thoracic Spine  Hyperkyphosis  Postural Observations  Seated posture: fair  Standing posture: fair  Correction of posture: makes symptoms better        Observations   Left Shoulder   Positive for atrophy  Right Shoulder  Positive for atrophy  Palpation   Left   Tenderness of the anterior deltoid, infraspinatus, middle deltoid, subscapularis, supraspinatus and upper trapezius  Right   Tenderness of the anterior deltoid, infraspinatus and upper trapezius  Cervical/Thoracic Screen   Cervical range of motion within normal limits with the following exceptions: Greatly limited rom in all directions, pain on left upper c/s with all motions as well but nothing reproduces cc of shoulder pain    Neurological Testing     Sensation     Shoulder   Left Shoulder   Intact: light touch    Right Shoulder   Intact: light touch    Active Range of Motion   Left Shoulder   Flexion: 145 degrees with pain  Abduction: 135 degrees with pain  External rotation BTH: T1   Internal rotation BTB: L1     Right Shoulder   Flexion: 145 degrees with pain  Abduction: 130 degrees     Scapular Mobility   Left Shoulder   Scapular mobility: fair    Right Shoulder   Scapular mobility: fair    Joint Play   Left Shoulder  Hypomobile in the anterior capsule, posterior capsule and inferior capsule  Strength/Myotome Testing     Left Shoulder     Planes of Motion   Flexion: 4   Extension: 4+   Abduction: 4   External rotation at 0°: 4-   Internal rotation at 0°: 4     Right Shoulder     Planes of Motion   Flexion: 4   Extension: 4+   Abduction: 4   External rotation at 0°: 4-   Internal rotation at 0°: 4     Tests     Left Shoulder   Positive empty can, Hawkin's and passive horizontal adduction       Ambulation     Observational Gait   Gait: antalgic   Left arm swing: decreased  Right arm swing: decreased             Precautions: right TKA, and right TSA              Daily Treatment Diary       Manuals 2/27 Exercise Diary                                                                                                                                                                                                                                                                                                            Modalities

## 2020-02-27 NOTE — TELEPHONE ENCOUNTER
LM for Kimi Wisdom to bring the paperwork for the Rt shoulder Authorization from the [facility] Patient bringing [place] paperwork for RT shoulder    Marilee Amaral [id number] valid 10/30/19 to 4/25/20  (This is not in media other [facility] auths are)

## 2020-03-02 ENCOUNTER — OFFICE VISIT (OUTPATIENT)
Dept: OBGYN CLINIC | Facility: HOSPITAL | Age: 65
End: 2020-03-02
Payer: COMMERCIAL

## 2020-03-02 VITALS
DIASTOLIC BLOOD PRESSURE: 77 MMHG | HEIGHT: 75 IN | BODY MASS INDEX: 33.45 KG/M2 | HEART RATE: 61 BPM | WEIGHT: 269 LBS | SYSTOLIC BLOOD PRESSURE: 125 MMHG

## 2020-03-02 DIAGNOSIS — M25.511 CHRONIC RIGHT SHOULDER PAIN: Primary | ICD-10-CM

## 2020-03-02 DIAGNOSIS — G89.29 CHRONIC RIGHT SHOULDER PAIN: Primary | ICD-10-CM

## 2020-03-02 DIAGNOSIS — Z96.611 S/P SHOULDER REPLACEMENT, RIGHT: ICD-10-CM

## 2020-03-02 PROCEDURE — 99214 OFFICE O/P EST MOD 30 MIN: CPT | Performed by: ORTHOPAEDIC SURGERY

## 2020-03-02 NOTE — PROGRESS NOTES
Assessment  Diagnoses and all orders for this visit:    Chronic right shoulder pain    S/P shoulder replacement, right        Discussion and Plan:    · Explained to the patient that his lab results, synovasure and CT arthrogram of his right shoulder revealed no signs of infection or loosening of the prosthesis  They also revealed an intact rotator cuff  · He was instructed to continue with formal physical therapy as well as supplement with home exercise program   · Discussed with the patient that the next step in treatment process would be to perform a revision to a reverse total shoulder arthroplasty but we would need further information about his current anatomic prosthesis from the operative note which the patient does state he is able to obtain  Patient did note that he will bring it by the office at his next visit  Low at this time he wishes to not perform a surgical procedure as he states that his symptoms have slightly improved since his last visit and he is able to perform his daily activities with only mild pain/symptoms  · He will follow up on an as-needed basis  Subjective:   Patient ID: America Khan is a 63 y o  male      HPI  51-year-old male presents to the office today for follow-up visit for his right shoulder  He has a history of a right TSA performed in 2017 in [place]  Patient reports today that his pain type symptoms have slightly improved since last visit due to beginning formal physical therapy  He does state that he continues to have a persistent dull, aching sensation about the anterior lateral aspect of the shoulder especially with repetitive overhead and overuse motions  Denies numbness and tingling, fever, chills        The following portions of the patient's history were reviewed and updated as appropriate: allergies, current medications, past family history, past medical history, past social history, past surgical history and problem list     Review of Systems Constitutional: Negative for chills, fatigue, fever and unexpected weight change  HENT: Negative for hearing loss, nosebleeds and sore throat  Eyes: Negative for pain, redness and visual disturbance  Respiratory: Negative for cough, shortness of breath and wheezing  Cardiovascular: Negative for chest pain, palpitations and leg swelling  Gastrointestinal: Negative for abdominal pain, nausea and vomiting  Endocrine: Negative for polydipsia and polyuria  Genitourinary: Negative for frequency and urgency  Skin: Negative for color change, rash and wound  Neurological: Negative for dizziness, weakness, numbness and headaches  Psychiatric/Behavioral: Negative for behavioral problems, self-injury and suicidal ideas  Objective:  /77   Pulse 61   Ht 6' 3" (1 905 m)   Wt 122 kg (269 lb)   BMI 33 62 kg/m²       Right Shoulder Exam     Tenderness   Right shoulder tenderness location: Diffusely about the shoulder  Range of Motion   External rotation: 60   Forward flexion: 160   Internal rotation 0 degrees: Lumbar     Muscle Strength   Abduction: 5/5   External rotation: 5/5     Other   Erythema: absent  Scars: present  Sensation: normal  Pulse: present    Comments:  Anterior incision is well-healed without signs of infection  Physical Exam   Constitutional: He is oriented to person, place, and time  He appears well-developed and well-nourished  No distress  Eyes: Pupils are equal, round, and reactive to light  Conjunctivae are normal    Neck: Normal range of motion  Neck supple  Cardiovascular: Normal rate, regular rhythm and intact distal pulses  Pulmonary/Chest: Effort normal and breath sounds normal    Abdominal: Soft  Bowel sounds are normal    Neurological: He is alert and oriented to person, place, and time  He has normal reflexes  Skin: Skin is warm and dry  No rash noted  No erythema  Psychiatric: He has a normal mood and affect   His behavior is normal  I have personally reviewed pertinent films in PACS and my interpretation is as follows  CT Arthrogram Right Shoulder:   Intact right total shoulder arthroplasty without evidence of loosening or periprosthetic fracture  There is no evidence of a rotator cuff tear, although there is mild subscapularis muscle atrophy which is consistent after TSA surgery      Lab results were personally reviewed as well - his synovial sure results are negative for out for defense in and all other markers of infection    His C reactive protein and sedimentation rate are all normal as well as is his white blood cell count        Scribe Attestation    I,:   Juan F Cyr am acting as a scribe while in the presence of the attending physician :        I,:   Shun Moore MD personally performed the services described in this documentation    as scribed in my presence :

## 2020-03-03 ENCOUNTER — CLINICAL SUPPORT (OUTPATIENT)
Dept: CARDIAC REHAB | Facility: HOSPITAL | Age: 65
End: 2020-03-03
Attending: INTERNAL MEDICINE
Payer: COMMERCIAL

## 2020-03-03 DIAGNOSIS — Z95.5 S/P CORONARY ARTERY STENT PLACEMENT: Primary | ICD-10-CM

## 2020-03-03 PROCEDURE — 93797 PHYS/QHP OP CAR RHAB WO ECG: CPT

## 2020-03-03 NOTE — PROGRESS NOTES
CARDIAC REHAB ASSESSMENT    Today's date: March 3, 2020  Patient name: Marta Armenta     : 1955       MRN: [id number]  PCP: Cynthia Dee MD  Referring Physician: Chris Thomas MD  Cardiologist: Dr Anastacio Jean  Surgeon: Carmela Woodruff  Dx: Stent    Date of onset: 2019  Cultural needs: n/a    Weight:    Wt Readings from Last 1 Encounters:   20 122 kg (269 lb)      Height:   Ht Readings from Last 1 Encounters:   20 6' 3" (1 905 m)     Medical History:   Past Medical History:   Diagnosis Date    Alzheimer disease ([facility] [address] )     Arthritis          Physical Limitations: right leg, left arm pain    Risk Factors   Cholesterol: Yes  Smoking: Former user  HTN: Yes  DM: No  Obesity: Yes   Inactivity: No  Stress:  perceived  stress: 6/10   Stressors:r/t alzheimers-forgetting things and how to do things   Goals for Stress Management:relaxation, breathing techniques    Family History:  Family History   Problem Relation Age of Onset    Colon cancer Mother     Dementia Mother     Prostate cancer Father     Heart disease Father        Allergies: Patient has no known allergies    ETOH:   Social History     Substance and Sexual Activity   Alcohol Use Yes    Binge frequency: Monthly         Current Medications:   Current Outpatient Medications   Medication Sig Dispense Refill    aspirin (ASPIRIN 81) 81 mg EC tablet       atorvastatin (LIPITOR) 80 mg tablet TAKE ONE-HALF TABLET BY MOUTH EVERY EVENING TO LOWER CHOLESTEROL;MAY CAUSE MUSCLE PAIN-REPORT SIDE EFFECTS TO YOUR PROVIDER*      Azelastine-Fluticasone 137-50 MCG/ACT SUSP SPRAY 1 PUFF IN NOSTRIL(S) TWICE A DAY      budesonide (PULMICORT) 0 25 mg/2 mL nebulizer solution 0 5MG (1 RESPULE) IN NEBULIZER DAILY      Cetirizine HCl 10 MG CAPS TAKE ONE TABLET BY MOUTH DAILY FOR ALLERGIES      clopidogrel (PLAVIX) 75 mg tablet TAKE ONE TABLET BY MOUTH EVERY DAY TO PREVENT BLOOD CLOTS (SAME AS PLAVIX)      Coenzyme Q10-Vitamin E (QUNOL ULTRA COQ10) 100-150 MG-UNIT CAPS       cyclobenzaprine (FLEXERIL) 10 mg tablet Take 10 mg by mouth 3 (three) times a day as needed for muscle spasms      donepezil (ARICEPT) 10 mg tablet Take 10 mg by mouth daily at bedtime      hydrochlorothiazide (MICROZIDE) 12 5 mg capsule TAKE ONE TABLET BY MOUTH EVERY DAY FOR BLOOD PRESSURE      isosorbide dinitrate (ISORDIL) 30 mg tablet TAKE ONE TABLET BY MOUTH DAILY FOR HEART AND/OR ANGINA      ketoconazole (NIZORAL) 2 % cream APPLY SMALL AMOUNT TOPICALLY TWICE A DAY      METOPROLOL SUCCINATE ER PO TAKE ONE-HALF TABLET BY MOUTH DAILY FOR BLOOD PRESSURE/HEART      naproxen (NAPROSYN) 500 mg tablet TAKE ONE TABLET BY MOUTH TWICE A DAY AS NEEDED FOR PAIN OR INFLAMMATION - TAKE WITH FOOD OR MILK      nitroglycerin (NITROSTAT) 0 4 mg SL tablet       Omega-3 Fatty Acids (SALMON OIL-1000 PO)       pantoprazole (PROTONIX) 20 mg tablet TAKE ONE TABLET BY MOUTH DAILY FOR STOMACH OR REFLUX      predniSONE 10 mg tablet Take 2 PO QID on the first day and then decrease by 1 pill daily until gone  Call once completed  36 tablet 0    sertraline (ZOLOFT) 100 mg tablet TAKE ONE-HALF TABLET BY MOUTH EVERY DAY FOR MOOD       No current facility-administered medications for this visit  Functional Status Prior to Diagnosis for Treatment   Occupation: retired  Recreation: none  ADLs: limited by Orthopedic limitations   [place]: No limitations  Exercise: no regular exercise  Other: n/a    Current Functional Status  Occupation: retired  Recreation: reports not able to do any hobbies anymore due to alzheimer's  ADLs:limited by Orthopedic limitations   [place]: limited by cognitive impairment  Exercise: walking 3x/week about 5 miles at at time  Other: reports he has had chest pain while walking that goes away when he stops  Had stress test, but no intervention was planned      Patient Specific Goals:  Lose weight LT goal 220 lbs, be able to walk further distance with no leg pain, decrease chest pain occurances    Short Term Program Goals: increased strength improved energy/stamina with ADLs exercise 120-150 mins/wk wt loss 1-2 ppw    Long Term Goals: increased maximal walking duration  increased intial training workload  Improved [facility] Activity Status score  Improved functional capacity  Improved Quality of Life - [facility] score reduced  Reduced waist circumference  weight loss goal of 220 lb    Ability to reach goals/rehabilitation potential:  Excellent    Projected return to function: 12 weeks  Objective tests: sub-max TM ETT      Nutritional   Reviewed details of Rate your Plate  Discussed key elements of heart healthy eating  Reviewed patient goals for dietary modifications and their clinical implications  Reviewed most recent lipid profile       Goals for dietary modification: low sodium      Emotional/Social  Patient reports feeling some depression since d/c    SOCIAL SUPPORT NETWORK    Marital status:     Rate 1-5:    Marriage: 5   Family: 5   Financial: 5   Relationships: 5   Spirituality: 5   Intellectual: 5      Domestic Violence Screening: No    Comments: n/a

## 2020-03-03 NOTE — PROGRESS NOTES
Cardiac Rehabilitation Plan of Care   Care Plan       Today's date: 3/3/2020   Visits: 1-intake  Patient name: Etienne Babin      : 1955  Age: 63 y o  MRN: [id number]  Referring Physician: Mónica Ritter MD  Cardiologist: Dr Yao Jean  Provider: Jason  Clinician: Jonnie Melchor MS, CEP      Dx: stent  Date of onset: 2019      SUMMARY OF PROGRESS:  Mr Ofelia Hilario is here today for cardiac rehabilitation evaluation  He has stenting done in 2019 and has just been advised to participate in cardiac rehab due to recent chest pain  He reports has had stress test, but no further stenting or intervention at this time  He reports he has been walking with his wife 3x/week up to 5 miles at a time  He feels he is limited to 5 miles due to leg pain  He also reports he has felt some chest heaviness when walking that goes away when he stops to rest  He is hoping that rehab will help to decrease that heaviness and get him into better shape physically  His main goal is to lose weight with long term goal of 220 lb  He would also like to be able to walk further distances without leg pain  He reports feeling down at times-mostly it seems to his Alzheimer's diagnosis  PHQ-9= 10  He finds himself getting more frustrated when he is not able to remember or perform simple tasks  He reports great support from his wife and family  His wife walks with him and makes sure he follows heart healthy eating plan  He completed TM ETT today reaching 5 8 METs at 10:30 min  Test stopped due to reaching THR and RPE of 6  Will plan to start exercise at 3 5-4 0 METs and increase as tolerated by patient over first 30 days of rehab        Medication compliance: Yes   Comments: Reports taking medications as prescribed    Fall Risk: Low   Comments: n/a    EKG changes: NSR      EXERCISE ASSESSMENT and PLAN    Current Exercise Program in Rehab:       Frequency: 3-5 days/week        Minutes: 30-40         METS: 3 5-4 0            HR: 30 beats above resting   RPE: 4-6         Modalities: Treadmill, Airdyne bike, UBE, Lifecycle, Rower and Recumbent bike      Exercise Progression 30 Day Goals :    Frequency: 3-5 days/week   Minutes: 30-40   METS: 4 0   HR: 30 beats above resting    RPE: 4-6   Modalities: Treadmill, Airdyne bike, UBE, Lifecycle, Rower and Recumbent bike    Strength trainin-3 days / week  12-15 repetitions  1-2 sets per modality   Will be added following 2-3 weeks of monitored exercise sessions   Modalities: Pull Downs, Lateral Raise, Arm Extension, Arm Curl, [employer] and Front Raises    Progressing: In Progress    Home Exercise: Type: walking, Frequency: 3days/week, Duration: 40-60 mins    Goals: 10% improvement in functional capacity, improved DASI score by 10%, Increase in peak CR METs by 40%, Resume ADLs with increased strength and Exercise 5 days/wk, >150mins/wk  Education: Benefit of exercise for CAD risk factors, home exercise guidelines, signs and sxs and RPE scale   Plan:education on home exercise guidelines and home exercise 30+ mins 2 days opposite CR  Readiness to change: Action:  (Changing behavior)      NUTRITION ASSESSMENT AND PLAN    Weight control:    Starting weight: 271 5 lb   Current weight:     Waist circumference:    Startin"   Current:    Diabetes: N/A  Lipid management: Discussed diet and lipid management and Last lipid profile 10/23/2019  Chol 123    HDL 42  LDL 69  Goals:LDL <100, HDL >40, TRG <150, CHOL <200, reduced waist circumference <40 inches, Wt  loss 1-2 ppw goal of -10 lbs  and 2 5-5%  wt loss  Education: heart healthy eating  low sodium diet  hydration  diet and lipid management  wt  loss  healthy choices while dining out  portion control  Progressing: In Progress  Plan: Increase PUFA and MUFA, Decrease SFA, Increase whole grains, increase fruits/vegs, eliminate processed meats, reduce red meat 1x/wk, swtich to low fat dairy and Reduce added sugars <25g/day  Readiness to change: Action:  (Changing behavior)      PSYCHOSOCIAL ASSESSMENT AND PLAN    Emotional:  Depression assessment:  PHQ-9 = 10-14 = Moderate Depression            Anxiety measure:  BEAU-7 = 0-4  = Not anxious  Self-reported stress level: 6 -r/t frustration from Alzheimer's disease forgetting things and not being able to perfom simple tasks  Social support: Excellent  Goals:  PHQ-9 - reduced severity by one level, Feelings in [facility] Score < 3, Physical Fitness in [facility] Score < 3, Daily Activity in [facility] Score < 3, Quality of Life in [facility] Score < 3 , Increased interest in doing things, improved concentration, improved positive thoughts of well being and increased energy  Education: signs/sxs of depression, benefits of positive support system, coping mechanisms and depression and CAD  Progressing: In Progress  Plan: PHQ-9 >5 will refer to MD and Practice relaxation techniques  Readiness to change: Preparation:  (Getting ready to change)       OTHER CORE COMPONENTS     Tobacco:   Social History     Tobacco Use   Smoking Status Former Smoker   Smokeless Tobacco Never Used       Tobacco Use Intervention: Referral to tobacco expert:   N/A    Blood pressure:    Restin/78   Exercise: 142/80    Goals: consistent BP < 130/80, reduced dietary sodium <2300mg, consistent exercise >150 mins/wk and medication compliance  Education:  understanding HTN and CAD and low sodium diet and HTN  Progressing: In Progress  Plan: Follow low fat, heart healthy diet, DASH diet, increase exercise time to help manage BP  Readiness to change: Action:  (Changing behavior)

## 2020-03-04 ENCOUNTER — CLINICAL SUPPORT (OUTPATIENT)
Dept: CARDIAC REHAB | Facility: HOSPITAL | Age: 65
End: 2020-03-04
Attending: INTERNAL MEDICINE
Payer: COMMERCIAL

## 2020-03-04 DIAGNOSIS — Z95.5 S/P CORONARY ARTERY STENT PLACEMENT: ICD-10-CM

## 2020-03-04 PROCEDURE — 93798 PHYS/QHP OP CAR RHAB W/ECG: CPT

## 2020-03-06 ENCOUNTER — CLINICAL SUPPORT (OUTPATIENT)
Dept: CARDIAC REHAB | Facility: HOSPITAL | Age: 65
End: 2020-03-06
Attending: INTERNAL MEDICINE
Payer: COMMERCIAL

## 2020-03-06 ENCOUNTER — TELEPHONE (OUTPATIENT)
Dept: NEUROSURGERY | Facility: CLINIC | Age: 65
End: 2020-03-06

## 2020-03-06 ENCOUNTER — TELEPHONE (OUTPATIENT)
Dept: OBGYN CLINIC | Facility: HOSPITAL | Age: 65
End: 2020-03-06

## 2020-03-06 DIAGNOSIS — Z95.5 H/O HEART ARTERY STENT: Primary | ICD-10-CM

## 2020-03-06 DIAGNOSIS — Z95.5 S/P CORONARY ARTERY STENT PLACEMENT: ICD-10-CM

## 2020-03-06 PROCEDURE — 93798 PHYS/QHP OP CAR RHAB W/ECG: CPT

## 2020-03-06 NOTE — TELEPHONE ENCOUNTER
Pt called to confirm appt and to inquire what is is for   Confirmed date,time,and location of appt and informed it is a consult re his pain referred by S/P

## 2020-03-06 NOTE — TELEPHONE ENCOUNTER
Patient is calling for:  Dr Nitesh Flores, Neuro         (dept)  Patient was given phone number: [phone]  Patient was then transferred to the above phone number

## 2020-03-09 ENCOUNTER — DOCUMENTATION (OUTPATIENT)
Dept: NEUROSURGERY | Facility: CLINIC | Age: 65
End: 2020-03-09

## 2020-03-09 ENCOUNTER — CLINICAL SUPPORT (OUTPATIENT)
Dept: CARDIAC REHAB | Facility: HOSPITAL | Age: 65
End: 2020-03-09
Attending: INTERNAL MEDICINE
Payer: COMMERCIAL

## 2020-03-09 DIAGNOSIS — Z95.5 S/P CORONARY ARTERY STENT PLACEMENT: ICD-10-CM

## 2020-03-09 PROCEDURE — 93798 PHYS/QHP OP CAR RHAB W/ECG: CPT

## 2020-03-10 ENCOUNTER — CONSULT (OUTPATIENT)
Dept: NEUROSURGERY | Facility: CLINIC | Age: 65
End: 2020-03-10
Payer: COMMERCIAL

## 2020-03-10 VITALS
HEIGHT: 75 IN | TEMPERATURE: 96.7 F | HEART RATE: 60 BPM | DIASTOLIC BLOOD PRESSURE: 80 MMHG | WEIGHT: 259 LBS | RESPIRATION RATE: 16 BRPM | SYSTOLIC BLOOD PRESSURE: 142 MMHG | BODY MASS INDEX: 32.2 KG/M2

## 2020-03-10 DIAGNOSIS — G89.4 CHRONIC PAIN SYNDROME: ICD-10-CM

## 2020-03-10 DIAGNOSIS — M54.2 NECK PAIN: ICD-10-CM

## 2020-03-10 DIAGNOSIS — M47.812 CERVICAL SPONDYLOSIS WITHOUT MYELOPATHY: ICD-10-CM

## 2020-03-10 DIAGNOSIS — M54.12 CERVICAL RADICULOPATHY: ICD-10-CM

## 2020-03-10 DIAGNOSIS — Z11.59 SCREENING FOR VIRAL DISEASE: ICD-10-CM

## 2020-03-10 DIAGNOSIS — M48.02 CERVICAL SPINAL STENOSIS: ICD-10-CM

## 2020-03-10 DIAGNOSIS — L40.50 PSORIATIC ARTHRITIS (HCC): Primary | ICD-10-CM

## 2020-03-10 PROCEDURE — 99203 OFFICE O/P NEW LOW 30 MIN: CPT | Performed by: NEUROLOGICAL SURGERY

## 2020-03-10 RX ORDER — CHLORHEXIDINE GLUCONATE 0.12 MG/ML
15 RINSE ORAL ONCE
Status: CANCELLED | OUTPATIENT
Start: 2020-03-10 | End: 2020-03-10

## 2020-03-10 RX ORDER — NAPROXEN SODIUM 275 MG/1
275 TABLET ORAL 2 TIMES DAILY WITH MEALS
COMMUNITY
End: 2020-01-01 | Stop reason: HOSPADM

## 2020-03-10 NOTE — PROGRESS NOTES
[facility] Office Note  Surjit Telles is a 63 y o  male      Visit Type: Consult      Diagnoses and all orders for this visit:    Psoriatic arthritis ([address] )  -     HLA-B27 antigen; Future  -     Sedimentation rate, automated; Future  -     C-reactive protein; Future    Chronic pain syndrome  -     Ambulatory referral to Neurosurgery    Neck pain  -     Ambulatory referral to Neurosurgery  -     Case request operating room: Anterior cervical diskectomy and fixation fusion C4-5; Standing  -     Case request operating room: Anterior cervical diskectomy and fixation fusion C4-5    Cervical radiculopathy  -     Ambulatory referral to Neurosurgery  -     Case request operating room: Anterior cervical diskectomy and fixation fusion C4-5; Standing  -     Case request operating room: Anterior cervical diskectomy and fixation fusion C4-5    Cervical spondylosis without myelopathy  -     Ambulatory referral to Neurosurgery  -     Case request operating room: Anterior cervical diskectomy and fixation fusion C4-5; Standing  -     Case request operating room: Anterior cervical diskectomy and fixation fusion C4-5    Cervical spinal stenosis  -     Ambulatory referral to Neurosurgery  -     Case request operating room: Anterior cervical diskectomy and fixation fusion C4-5; Standing  -     Case request operating room: Anterior cervical diskectomy and fixation fusion C4-5    Other orders  -     naproxen sodium (ANAPROX) 275 MG tablet;  Take 275 mg by mouth 2 (two) times a day with meals  -     Diet NPO; Sips with meds; Standing  -     Nursing Communication [address] [place] [address] Interventions Implemented; Standing  -     Nursing Communication Use 2 CHG cloths, have staff wash the entire body (neck down) ; Standing  -     Nursing Communication Swab both nares with Povidone-Iodine solution, EXCLUDE if patient has shellfish/Iodine allergy; Standing  -     chlorhexidine (PERIDEX) 0 12 % oral rinse 15 mL  -     Void on call to OR; Standing  -     Insert peripheral IV; Standing  -     ceFAZolin (ANCEF) 2,000 mg in sodium chloride 0 9 % 50 mL IVPB        DISCUSSION SUMMARY  51-year-old male with spinal stenosis and nerve root compression at the C4-5 level eccentric towards the right side  He has a C5 radiculopathy and severe neck pain  I have recommended that he undergo the above-noted surgical procedure  The risks, benefits and complications of surgery were explained in detail to [employer] and his wife  including hemorrhage, infection, CSF leakage, wound problems, pain, weakness, numbness, dysesthesiae, paralysis, coma, and death  Also, the possibility  of further surgery being required was emphasized  Other potential medical complications were outlined, including deep venous thrombosis, pulmonary embolism, pneumonia, urinary tract infection, myocardial infarction,  and stroke  The need for physical therapy, occupational therapy, and rehabilitation was also mentioned  The alternatives to surgery were discussed  [employer] and his wife asked relevant questions and asked that we proceed with arrangements for surgery  In addition to these thoughts he does appear to have psoriatic arthritis  I do not see that he is ever been screened with an HLA B 27 or sedimentation rate and CRP  It may be that he could be a responder with inflammatory arthritis to the tumor necrosis factor inhibitors which could dramatically reduced his overall arthritic load  Return for Scheduled surgery  CHIEF COMPLAINT  Patient presents for initial consult regarding neck pain with spinal stenosis    SX INFO  NA    HPI  Patient was referred by MINNIE Cody ([facility]) for evaluation of neck pain  Per patient this pain has been present since 1976   He has problems with low back and sacroiliac joint pain, but [facility] decided to hold off on any repeat injections for the low back and try to concentrate on his cervical spine since that seems to be his biggest issue  He has chronic pain syndrome  He has tried medications, PT, and EDSI  He only gets temporary relief with treatment  He states that when he had the EDSI it was for his low back and it felt like it was only pressure in his spine for some time but then the pain returned  Patient has had increasing pain and discomfort in his neck with weakness and pain in the right shoulder  He has been told that a reconstruction of his right shoulder was successful and that there is no obvious problem in the shoulder although he continues to have pain and weakness of his shoulder  He has arthritis in multiple portions of his body including his knees back and neck  He has increasing difficulty with fine motor function  His been diagnosed without sign worse disease  His great difficulty in sleeping at night because of generalized arthritic pain  REVIEW OF SYSTEMS  Review of Systems   Constitutional: Positive for activity change (Difficulty with fine finger motion bilaterally; Trouble driving)  HENT: Negative  Eyes: Negative  Respiratory: Negative  Cardiovascular: Negative  Gastrointestinal: Negative  Endocrine: Negative  Genitourinary: Negative  Musculoskeletal: Positive for arthralgias (bilateral shoulder pain), gait problem (hx of knee replacement) and neck pain (severity depends on acitivity)  Dx w/Arthritis   Skin: Negative  Allergic/Immunologic: Negative  Neurological: Positive for weakness (bilateral shoulder; has history of shoulder replacement) and numbness (bilateral hands)  Hematological: Negative  Psychiatric/Behavioral: Negative  All other systems reviewed and are negative  I reviewed the ROS      MEDICAL HISTORY  Past Medical History:   Diagnosis Date    Alzheimer disease ([facility] [address] )     Arthritis        Past Surgical History:   Procedure Laterality Date    CORONARY ANGIOPLASTY WITH STENT PLACEMENT Left     FL GUIDED NEEDLE PLAC BX/ASP/INJ  2/24/2020    FL INJECTION RIGHT SHOULDER (ARTHROGRAM)  2/24/2020    KNEE SURGERY Right 05/2017    replacement    SHOULDER SURGERY Right 11/2017    replacement       Social History     Tobacco Use    Smoking status: Former Smoker    Smokeless tobacco: Never Used   Substance Use Topics    Alcohol use: Yes     Binge frequency: Monthly    Drug use: Never       Vitals:    03/10/20 1011   BP: 142/80   BP Location: Right arm   Patient Position: Sitting   Cuff Size: Standard   Pulse: 60   Resp: 16   Temp: (!) 96 7 °F (35 9 °C)   TempSrc: Tympanic   Weight: 117 kg (259 lb)   Height: 6' 3" (1 905 m)         Current Outpatient Medications:     aspirin (ASPIRIN 81) 81 mg EC tablet, Take 81 mg by mouth daily , Disp: , Rfl:     atorvastatin (LIPITOR) 80 mg tablet, TAKE ONE-HALF TABLET BY MOUTH EVERY EVENING TO LOWER CHOLESTEROL;MAY CAUSE MUSCLE PAIN-REPORT SIDE EFFECTS TO YOUR PROVIDER*, Disp: , Rfl:     Azelastine-Fluticasone 137-50 MCG/ACT SUSP, SPRAY 1 PUFF IN NOSTRIL(S) TWICE A DAY, Disp: , Rfl:     Cetirizine HCl 10 MG CAPS, TAKE ONE TABLET BY MOUTH DAILY FOR ALLERGIES, Disp: , Rfl:     clopidogrel (PLAVIX) 75 mg tablet, TAKE ONE TABLET BY MOUTH EVERY DAY TO PREVENT BLOOD CLOTS (SAME AS PLAVIX), Disp: , Rfl:     cyclobenzaprine (FLEXERIL) 10 mg tablet, Take 10 mg by mouth 3 (three) times a day as needed for muscle spasms, Disp: , Rfl:     donepezil (ARICEPT) 10 mg tablet, Take 10 mg by mouth daily at bedtime, Disp: , Rfl:     hydrochlorothiazide (MICROZIDE) 12 5 mg capsule, TAKE ONE TABLET BY MOUTH EVERY DAY FOR BLOOD PRESSURE, Disp: , Rfl:     isosorbide dinitrate (ISORDIL) 30 mg tablet, TAKE ONE TABLET BY MOUTH DAILY FOR HEART AND/OR ANGINA, Disp: , Rfl:     metoprolol succinate (TOPROL-XL) 50 mg 24 hr tablet, Take 25 mg by mouth daily , Disp: , Rfl:     naproxen sodium (ANAPROX) 275 MG tablet, Take 275 mg by mouth 2 (two) times a day with meals, Disp: , Rfl:     nitroglycerin (NITROSTAT) 0 4 mg SL tablet, Place 0 4 mg under the tongue as needed for chest pain , Disp: , Rfl:     pantoprazole (PROTONIX) 20 mg tablet, TAKE ONE TABLET BY MOUTH DAILY FOR STOMACH OR REFLUX, Disp: , Rfl:     sertraline (ZOLOFT) 100 mg tablet, Take 50 mg by mouth daily , Disp: , Rfl:      No Known Allergies     The following portions of the patient's history were updated by MA and reviewed by MD: allergies, current medications, past family history, past medical history, past social history, past surgical history and problem list       Physical Exam   Awake alert and oriented  Extraocular movements are intact  Pupils are equal round reactive to light and accommodate  Facial sensation is intact bilaterally  Facial expression is intact in grimace and at rest  His speech is clear and comprehensible  He does have some difficulty with conversational memory  Power in the upper extremities is 5/5 except for his deltoid on the right side  His deep tendon reflexes are absent at the brachioradialis bilaterally  His triceps reflexes are intact  He is an unsupported normal station and gait  He has difficulty with tandem gait  Psoriatic lesions elbows and knees  Deltoid paresis on the right at 4-out of 5  Reduction and fine touch and pinprick in the C5 distribution on the right  His heart is of regular rate and rhythm without murmurs gallops or rubs  Lungs are clear to auscultation    RESULTS/DATA  I have personally reviewed pertinent films in PACS   MRI of cervical spine is carefully reviewed and demonstrated to the family  This demonstrates multilevel arthritis and severe facet arthropathy with auto fusions and facet hypertrophy  Anteriorly there is a disc osteophyte complex causing compression of the spinal cord and nerve roots eccentric to the right side  X-rays of the cervical spine demonstrate severe facet hypertrophy and auto fusion with arthropathy

## 2020-03-11 ENCOUNTER — CLINICAL SUPPORT (OUTPATIENT)
Dept: CARDIAC REHAB | Facility: HOSPITAL | Age: 65
End: 2020-03-11
Attending: INTERNAL MEDICINE
Payer: COMMERCIAL

## 2020-03-11 ENCOUNTER — APPOINTMENT (OUTPATIENT)
Dept: LAB | Facility: HOSPITAL | Age: 65
End: 2020-03-11
Attending: NEUROLOGICAL SURGERY
Payer: COMMERCIAL

## 2020-03-11 DIAGNOSIS — M54.2 NECK PAIN: ICD-10-CM

## 2020-03-11 DIAGNOSIS — M54.12 CERVICAL RADICULOPATHY: ICD-10-CM

## 2020-03-11 DIAGNOSIS — M47.812 CERVICAL SPONDYLOSIS WITHOUT MYELOPATHY: ICD-10-CM

## 2020-03-11 DIAGNOSIS — Z95.5 S/P CORONARY ARTERY STENT PLACEMENT: ICD-10-CM

## 2020-03-11 DIAGNOSIS — G89.4 CHRONIC PAIN SYNDROME: ICD-10-CM

## 2020-03-11 DIAGNOSIS — L40.50 PSORIATIC ARTHRITIS (HCC): ICD-10-CM

## 2020-03-11 DIAGNOSIS — M48.02 CERVICAL SPINAL STENOSIS: ICD-10-CM

## 2020-03-11 LAB
ALBUMIN SERPL BCP-MCNC: 3.8 G/DL (ref 3.5–5)
ALP SERPL-CCNC: 95 U/L (ref 46–116)
ALT SERPL W P-5'-P-CCNC: 31 U/L (ref 12–78)
ANION GAP SERPL CALCULATED.3IONS-SCNC: 3 MMOL/L (ref 4–13)
APTT PPP: 34 SECONDS (ref 23–37)
AST SERPL W P-5'-P-CCNC: 20 U/L (ref 5–45)
BASOPHILS # BLD AUTO: 0.11 THOUSANDS/ΜL (ref 0–0.1)
BASOPHILS NFR BLD AUTO: 2 % (ref 0–1)
BILIRUB SERPL-MCNC: 0.5 MG/DL (ref 0.2–1)
BILIRUB UR QL STRIP: NEGATIVE
BUN SERPL-MCNC: 22 MG/DL (ref 5–25)
CALCIUM SERPL-MCNC: 8.9 MG/DL (ref 8.3–10.1)
CHLORIDE SERPL-SCNC: 109 MMOL/L (ref 100–108)
CLARITY UR: CLEAR
CO2 SERPL-SCNC: 31 MMOL/L (ref 21–32)
COLOR UR: YELLOW
CREAT SERPL-MCNC: 1.22 MG/DL (ref 0.6–1.3)
EOSINOPHIL # BLD AUTO: 0.37 THOUSAND/ΜL (ref 0–0.61)
EOSINOPHIL NFR BLD AUTO: 5 % (ref 0–6)
ERYTHROCYTE [DISTWIDTH] IN BLOOD BY AUTOMATED COUNT: 14.2 % (ref 11.6–15.1)
EST. AVERAGE GLUCOSE BLD GHB EST-MCNC: 117 MG/DL
GFR SERPL CREATININE-BSD FRML MDRD: 63 ML/MIN/1.73SQ M
GLUCOSE P FAST SERPL-MCNC: 108 MG/DL (ref 65–99)
GLUCOSE UR STRIP-MCNC: NEGATIVE MG/DL
HBA1C MFR BLD: 5.7 %
HCT VFR BLD AUTO: 49.1 % (ref 36.5–49.3)
HGB BLD-MCNC: 15.6 G/DL (ref 12–17)
HGB UR QL STRIP.AUTO: NEGATIVE
IMM GRANULOCYTES # BLD AUTO: 0.02 THOUSAND/UL (ref 0–0.2)
IMM GRANULOCYTES NFR BLD AUTO: 0 % (ref 0–2)
INR PPP: 0.99 (ref 0.84–1.19)
KETONES UR STRIP-MCNC: NEGATIVE MG/DL
LEUKOCYTE ESTERASE UR QL STRIP: NEGATIVE
LYMPHOCYTES # BLD AUTO: 2.35 THOUSANDS/ΜL (ref 0.6–4.47)
LYMPHOCYTES NFR BLD AUTO: 34 % (ref 14–44)
MCH RBC QN AUTO: 28.9 PG (ref 26.8–34.3)
MCHC RBC AUTO-ENTMCNC: 31.8 G/DL (ref 31.4–37.4)
MCV RBC AUTO: 91 FL (ref 82–98)
MONOCYTES # BLD AUTO: 0.77 THOUSAND/ΜL (ref 0.17–1.22)
MONOCYTES NFR BLD AUTO: 11 % (ref 4–12)
NEUTROPHILS # BLD AUTO: 3.26 THOUSANDS/ΜL (ref 1.85–7.62)
NEUTS SEG NFR BLD AUTO: 48 % (ref 43–75)
NITRITE UR QL STRIP: NEGATIVE
NRBC BLD AUTO-RTO: 0 /100 WBCS
PH UR STRIP.AUTO: 6.5 [PH]
PLATELET # BLD AUTO: 285 THOUSANDS/UL (ref 149–390)
PMV BLD AUTO: 10.2 FL (ref 8.9–12.7)
POTASSIUM SERPL-SCNC: 4.5 MMOL/L (ref 3.5–5.3)
PROT SERPL-MCNC: 6.8 G/DL (ref 6.4–8.2)
PROT UR STRIP-MCNC: NEGATIVE MG/DL
PROTHROMBIN TIME: 12.7 SECONDS (ref 11.6–14.5)
RBC # BLD AUTO: 5.4 MILLION/UL (ref 3.88–5.62)
SODIUM SERPL-SCNC: 143 MMOL/L (ref 136–145)
SP GR UR STRIP.AUTO: 1.02 (ref 1–1.03)
UROBILINOGEN UR QL STRIP.AUTO: 0.2 E.U./DL
WBC # BLD AUTO: 6.88 THOUSAND/UL (ref 4.31–10.16)

## 2020-03-11 PROCEDURE — 85610 PROTHROMBIN TIME: CPT

## 2020-03-11 PROCEDURE — 83036 HEMOGLOBIN GLYCOSYLATED A1C: CPT

## 2020-03-11 PROCEDURE — 80053 COMPREHEN METABOLIC PANEL: CPT

## 2020-03-11 PROCEDURE — 93798 PHYS/QHP OP CAR RHAB W/ECG: CPT

## 2020-03-11 PROCEDURE — 85730 THROMBOPLASTIN TIME PARTIAL: CPT

## 2020-03-11 PROCEDURE — 36415 COLL VENOUS BLD VENIPUNCTURE: CPT

## 2020-03-11 PROCEDURE — 85025 COMPLETE CBC W/AUTO DIFF WBC: CPT

## 2020-03-11 PROCEDURE — 87081 CULTURE SCREEN ONLY: CPT

## 2020-03-11 PROCEDURE — 81003 URINALYSIS AUTO W/O SCOPE: CPT | Performed by: NEUROLOGICAL SURGERY

## 2020-03-12 LAB — MRSA NOSE QL CULT: NORMAL

## 2020-03-13 ENCOUNTER — APPOINTMENT (OUTPATIENT)
Dept: CARDIAC REHAB | Facility: HOSPITAL | Age: 65
End: 2020-03-13
Attending: INTERNAL MEDICINE
Payer: COMMERCIAL

## 2020-03-16 ENCOUNTER — CLINICAL SUPPORT (OUTPATIENT)
Dept: CARDIAC REHAB | Facility: HOSPITAL | Age: 65
End: 2020-03-16
Attending: INTERNAL MEDICINE
Payer: COMMERCIAL

## 2020-03-16 DIAGNOSIS — Z95.5 S/P CORONARY ARTERY STENT PLACEMENT: ICD-10-CM

## 2020-03-16 PROCEDURE — 93798 PHYS/QHP OP CAR RHAB W/ECG: CPT

## 2020-03-18 ENCOUNTER — CLINICAL SUPPORT (OUTPATIENT)
Dept: CARDIAC REHAB | Facility: HOSPITAL | Age: 65
End: 2020-03-18
Attending: INTERNAL MEDICINE
Payer: COMMERCIAL

## 2020-03-18 DIAGNOSIS — Z95.5 S/P CORONARY ARTERY STENT PLACEMENT: ICD-10-CM

## 2020-03-18 PROCEDURE — 93798 PHYS/QHP OP CAR RHAB W/ECG: CPT

## 2020-03-20 ENCOUNTER — DOCUMENTATION (OUTPATIENT)
Dept: NEUROSURGERY | Facility: CLINIC | Age: 65
End: 2020-03-20

## 2020-03-23 ENCOUNTER — APPOINTMENT (OUTPATIENT)
Dept: CARDIAC REHAB | Facility: HOSPITAL | Age: 65
End: 2020-03-23
Attending: INTERNAL MEDICINE
Payer: COMMERCIAL

## 2020-03-25 ENCOUNTER — APPOINTMENT (OUTPATIENT)
Dept: CARDIAC REHAB | Facility: HOSPITAL | Age: 65
End: 2020-03-25
Attending: INTERNAL MEDICINE
Payer: COMMERCIAL

## 2020-03-25 NOTE — PROGRESS NOTES
Cardiac Rehabilitation Plan of Care   30 Day Report      Today's date: 3/25/2020   Visits: 7  Patient name: Etienne Babin      : 1955  Age: 63 y o  MRN: [id number]  Referring Physician: Divya Khalil MD  Cardiologist: Dr Yao Jean  Provider: Jason  Clinician: Jonnie Melchor MS, CEP      Dx: stent  Date of onset: 2019      SUMMARY OF PROGRESS:  Mr Ofelia Hilario has started his cardiac rehabilitation program  He has attended 7 sessions of his rehab at this time and is doing well progressing his exercise and feels on target with his goals  He reports he has been walking with his wife 3x/week up to 5 miles at a time  He feels he is limited to 5 miles due to leg pain  He also reports he has felt some chest heaviness when walking that goes away when he stops to rest  He is hoping that rehab will help to decrease that heaviness and get him into better shape physically  His main goal is to lose weight with long term goal of 220 lb  He would also like to be able to walk further distances without leg pain  He is placing his rehab on hold at this time due to [address] precautions  We will plan to progress his exercise when he resumes rehab at a later date        Medication compliance: Yes   Comments: Reports taking medications as prescribed    Fall Risk: Low   Comments: n/a    EKG changes: NSR      EXERCISE ASSESSMENT and PLAN    Current Exercise Program in Rehab:       Frequency: 3-5 days/week        Minutes: 30-40         METS: 3 5-4 0            HR: 30 beats above resting   RPE: 4-6         Modalities: Treadmill, Airdyne bike, UBE, Lifecycle, Rower and Recumbent bike      Exercise Progression 30 Day Goals :    Frequency: 3-5 days/week   Minutes: 30-40   METS: 4 0   HR: 30 beats above resting    RPE: 4-6   Modalities: Treadmill, Airdyne bike, UBE, Lifecycle, Rower and Recumbent bike    Strength trainin-3 days / week  12-15 repetitions  1-2 sets per modality   Will be added following 2-3 weeks of monitored exercise sessions   Modalities: Pull Downs, Lateral Raise, Arm Extension, Arm Curl, [employer] and Front Raises    Progressing: In Progress    Home Exercise: Type: walking, Frequency: 3days/week, Duration: 40-60 mins    Goals: 10% improvement in functional capacity, improved DASI score by 10%, Increase in peak CR METs by 40%, Resume ADLs with increased strength and Exercise 5 days/wk, >150mins/wk  Education: Benefit of exercise for CAD risk factors, home exercise guidelines, signs and sxs and RPE scale   Plan:education on home exercise guidelines and home exercise 30+ mins 2 days opposite CR  Readiness to change: Action:  (Changing behavior)      NUTRITION ASSESSMENT AND PLAN    Weight control:    Starting weight: 271 5 lb   Current weight:     Waist circumference:    Startin"   Current:    Diabetes: N/A  Lipid management: Discussed diet and lipid management and Last lipid profile 10/23/2019  Chol 123    HDL 42  LDL 69  Goals:LDL <100, HDL >40, TRG <150, CHOL <200, reduced waist circumference <40 inches, Wt  loss 1-2 ppw goal of -10 lbs  and 2 5-5%  wt loss  Education: heart healthy eating  low sodium diet  hydration  diet and lipid management  wt  loss  healthy choices while dining out  portion control  Progressing: In Progress  Plan: Increase PUFA and MUFA, Decrease SFA, Increase whole grains, increase fruits/vegs, eliminate processed meats, reduce red meat 1x/wk, swtich to low fat dairy and Reduce added sugars <25g/day  Readiness to change: Action:  (Changing behavior)      PSYCHOSOCIAL ASSESSMENT AND PLAN    Emotional:  Depression assessment:  PHQ-9 = 10-14 = Moderate Depression            Anxiety measure:  BEAU-7 = 0-4  = Not anxious  Self-reported stress level: 6 -r/t frustration from Alzheimer's disease forgetting things and not being able to perfom simple tasks  Social support: Excellent  Goals:  PHQ-9 - reduced severity by one level, Feelings in [facility] Score < 3, Physical Fitness in [facility] Score < 3, Daily Activity in [facility] Score < 3, Quality of Life in [facility] Score < 3 , Increased interest in doing things, improved concentration, improved positive thoughts of well being and increased energy  Education: signs/sxs of depression, benefits of positive support system, coping mechanisms and depression and CAD  Progressing: In Progress  Plan: PHQ-9 >5 will refer to MD and Practice relaxation techniques  Readiness to change: Preparation:  (Getting ready to change)       OTHER CORE COMPONENTS     Tobacco:   Social History     Tobacco Use   Smoking Status Former Smoker   Smokeless Tobacco Never Used   Tobacco Comment    quit " a couple years ago"        Tobacco Use Intervention: Referral to tobacco expert:   N/A    Blood pressure:    Restin/78   Exercise: 142/80    Goals: consistent BP < 130/80, reduced dietary sodium <2300mg, consistent exercise >150 mins/wk and medication compliance  Education:  understanding HTN and CAD and low sodium diet and HTN  Progressing: In Progress  Plan: Follow low fat, heart healthy diet, DASH diet, increase exercise time to help manage BP  Readiness to change: Action:  (Changing behavior)

## 2020-03-27 ENCOUNTER — APPOINTMENT (OUTPATIENT)
Dept: CARDIAC REHAB | Facility: HOSPITAL | Age: 65
End: 2020-03-27
Attending: INTERNAL MEDICINE
Payer: COMMERCIAL

## 2020-03-30 ENCOUNTER — APPOINTMENT (OUTPATIENT)
Dept: CARDIAC REHAB | Facility: HOSPITAL | Age: 65
End: 2020-03-30
Attending: INTERNAL MEDICINE
Payer: COMMERCIAL

## 2020-04-17 ENCOUNTER — TELEPHONE (OUTPATIENT)
Dept: CARDIAC REHAB | Facility: HOSPITAL | Age: 65
End: 2020-04-17

## 2020-04-20 ENCOUNTER — DOCUMENTATION (OUTPATIENT)
Dept: NEUROSURGERY | Facility: CLINIC | Age: 65
End: 2020-04-20

## 2020-04-30 ENCOUNTER — TELEPHONE (OUTPATIENT)
Dept: OBGYN CLINIC | Facility: HOSPITAL | Age: 65
End: 2020-04-30

## 2020-05-01 ENCOUNTER — APPOINTMENT (OUTPATIENT)
Dept: CARDIAC REHAB | Facility: HOSPITAL | Age: 65
End: 2020-05-01
Attending: INTERNAL MEDICINE
Payer: COMMERCIAL

## 2020-05-04 ENCOUNTER — APPOINTMENT (OUTPATIENT)
Dept: CARDIAC REHAB | Facility: HOSPITAL | Age: 65
End: 2020-05-04
Attending: INTERNAL MEDICINE
Payer: COMMERCIAL

## 2020-05-06 ENCOUNTER — APPOINTMENT (OUTPATIENT)
Dept: CARDIAC REHAB | Facility: HOSPITAL | Age: 65
End: 2020-05-06
Attending: INTERNAL MEDICINE
Payer: COMMERCIAL

## 2020-05-08 ENCOUNTER — APPOINTMENT (OUTPATIENT)
Dept: CARDIAC REHAB | Facility: HOSPITAL | Age: 65
End: 2020-05-08
Attending: INTERNAL MEDICINE
Payer: COMMERCIAL

## 2020-05-11 ENCOUNTER — APPOINTMENT (OUTPATIENT)
Dept: CARDIAC REHAB | Facility: HOSPITAL | Age: 65
End: 2020-05-11
Attending: INTERNAL MEDICINE
Payer: COMMERCIAL

## 2020-05-12 ENCOUNTER — CLINICAL SUPPORT (OUTPATIENT)
Dept: CARDIAC REHAB | Facility: HOSPITAL | Age: 65
End: 2020-05-12
Attending: INTERNAL MEDICINE
Payer: COMMERCIAL

## 2020-05-12 DIAGNOSIS — Z95.5 S/P CORONARY ARTERY STENT PLACEMENT: ICD-10-CM

## 2020-05-12 PROCEDURE — 93798 PHYS/QHP OP CAR RHAB W/ECG: CPT

## 2020-05-13 ENCOUNTER — APPOINTMENT (OUTPATIENT)
Dept: CARDIAC REHAB | Facility: HOSPITAL | Age: 65
End: 2020-05-13
Attending: INTERNAL MEDICINE
Payer: COMMERCIAL

## 2020-05-14 ENCOUNTER — HOSPITAL ENCOUNTER (EMERGENCY)
Facility: HOSPITAL | Age: 65
Discharge: HOME | End: 2020-05-14
Attending: EMERGENCY MEDICINE | Admitting: EMERGENCY MEDICINE
Payer: COMMERCIAL

## 2020-05-14 ENCOUNTER — APPOINTMENT (EMERGENCY)
Dept: RADIOLOGY | Facility: HOSPITAL | Age: 65
End: 2020-05-14
Payer: COMMERCIAL

## 2020-05-14 ENCOUNTER — CLINICAL SUPPORT (OUTPATIENT)
Dept: CARDIAC REHAB | Facility: HOSPITAL | Age: 65
End: 2020-05-14
Attending: INTERNAL MEDICINE
Payer: COMMERCIAL

## 2020-05-14 VITALS
HEART RATE: 66 BPM | RESPIRATION RATE: 17 BRPM | SYSTOLIC BLOOD PRESSURE: 143 MMHG | TEMPERATURE: 98.6 F | DIASTOLIC BLOOD PRESSURE: 72 MMHG | OXYGEN SATURATION: 97 %

## 2020-05-14 DIAGNOSIS — Z95.5 S/P CORONARY ARTERY STENT PLACEMENT: ICD-10-CM

## 2020-05-14 DIAGNOSIS — R07.89 ATYPICAL CHEST PAIN: Primary | ICD-10-CM

## 2020-05-14 LAB
ALBUMIN SERPL BCP-MCNC: 4.1 G/DL (ref 3.5–5)
ALP SERPL-CCNC: 113 U/L (ref 46–116)
ALT SERPL W P-5'-P-CCNC: 49 U/L (ref 12–78)
ANION GAP SERPL CALCULATED.3IONS-SCNC: 3 MMOL/L (ref 4–13)
APTT PPP: 29 SECONDS (ref 23–37)
AST SERPL W P-5'-P-CCNC: 46 U/L (ref 5–45)
ATRIAL RATE: 65 BPM
BASOPHILS # BLD AUTO: 0.08 THOUSANDS/ΜL (ref 0–0.1)
BASOPHILS NFR BLD AUTO: 1 % (ref 0–1)
BILIRUB SERPL-MCNC: 0.7 MG/DL (ref 0.2–1)
BUN SERPL-MCNC: 24 MG/DL (ref 5–25)
CALCIUM SERPL-MCNC: 8.9 MG/DL (ref 8.3–10.1)
CHLORIDE SERPL-SCNC: 103 MMOL/L (ref 100–108)
CO2 SERPL-SCNC: 30 MMOL/L (ref 21–32)
CREAT SERPL-MCNC: 1.31 MG/DL (ref 0.6–1.3)
EOSINOPHIL # BLD AUTO: 0.47 THOUSAND/ΜL (ref 0–0.61)
EOSINOPHIL NFR BLD AUTO: 6 % (ref 0–6)
ERYTHROCYTE [DISTWIDTH] IN BLOOD BY AUTOMATED COUNT: 12.9 % (ref 11.6–15.1)
GFR SERPL CREATININE-BSD FRML MDRD: 58 ML/MIN/1.73SQ M
GLUCOSE SERPL-MCNC: 87 MG/DL (ref 65–140)
HCT VFR BLD AUTO: 47.6 % (ref 36.5–49.3)
HGB BLD-MCNC: 15.6 G/DL (ref 12–17)
IMM GRANULOCYTES # BLD AUTO: 0.02 THOUSAND/UL (ref 0–0.2)
IMM GRANULOCYTES NFR BLD AUTO: 0 % (ref 0–2)
INR PPP: 1.02 (ref 0.84–1.19)
LYMPHOCYTES # BLD AUTO: 2.71 THOUSANDS/ΜL (ref 0.6–4.47)
LYMPHOCYTES NFR BLD AUTO: 32 % (ref 14–44)
MCH RBC QN AUTO: 29.2 PG (ref 26.8–34.3)
MCHC RBC AUTO-ENTMCNC: 32.8 G/DL (ref 31.4–37.4)
MCV RBC AUTO: 89 FL (ref 82–98)
MONOCYTES # BLD AUTO: 0.97 THOUSAND/ΜL (ref 0.17–1.22)
MONOCYTES NFR BLD AUTO: 11 % (ref 4–12)
NEUTROPHILS # BLD AUTO: 4.3 THOUSANDS/ΜL (ref 1.85–7.62)
NEUTS SEG NFR BLD AUTO: 50 % (ref 43–75)
NRBC BLD AUTO-RTO: 0 /100 WBCS
P AXIS: 5 DEGREES
PLATELET # BLD AUTO: 261 THOUSANDS/UL (ref 149–390)
PMV BLD AUTO: 10.1 FL (ref 8.9–12.7)
POTASSIUM SERPL-SCNC: 3.4 MMOL/L (ref 3.5–5.3)
PR INTERVAL: 176 MS
PROT SERPL-MCNC: 7.1 G/DL (ref 6.4–8.2)
PROTHROMBIN TIME: 13.1 SECONDS (ref 11.6–14.5)
QRS AXIS: 36 DEGREES
QRSD INTERVAL: 88 MS
QT INTERVAL: 384 MS
QTC INTERVAL: 399 MS
RBC # BLD AUTO: 5.34 MILLION/UL (ref 3.88–5.62)
SODIUM SERPL-SCNC: 136 MMOL/L (ref 136–145)
T WAVE AXIS: 54 DEGREES
TROPONIN I SERPL-MCNC: <0.02 NG/ML
TROPONIN I SERPL-MCNC: <0.02 NG/ML
VENTRICULAR RATE: 65 BPM
WBC # BLD AUTO: 8.55 THOUSAND/UL (ref 4.31–10.16)

## 2020-05-14 PROCEDURE — 80053 COMPREHEN METABOLIC PANEL: CPT | Performed by: PHYSICIAN ASSISTANT

## 2020-05-14 PROCEDURE — 85610 PROTHROMBIN TIME: CPT | Performed by: PHYSICIAN ASSISTANT

## 2020-05-14 PROCEDURE — 84484 ASSAY OF TROPONIN QUANT: CPT | Performed by: PHYSICIAN ASSISTANT

## 2020-05-14 PROCEDURE — 36415 COLL VENOUS BLD VENIPUNCTURE: CPT | Performed by: PHYSICIAN ASSISTANT

## 2020-05-14 PROCEDURE — 85025 COMPLETE CBC W/AUTO DIFF WBC: CPT | Performed by: PHYSICIAN ASSISTANT

## 2020-05-14 PROCEDURE — 93005 ELECTROCARDIOGRAM TRACING: CPT

## 2020-05-14 PROCEDURE — 96374 THER/PROPH/DIAG INJ IV PUSH: CPT

## 2020-05-14 PROCEDURE — 99285 EMERGENCY DEPT VISIT HI MDM: CPT | Performed by: PHYSICIAN ASSISTANT

## 2020-05-14 PROCEDURE — 99285 EMERGENCY DEPT VISIT HI MDM: CPT

## 2020-05-14 PROCEDURE — 93798 PHYS/QHP OP CAR RHAB W/ECG: CPT

## 2020-05-14 PROCEDURE — 85730 THROMBOPLASTIN TIME PARTIAL: CPT | Performed by: PHYSICIAN ASSISTANT

## 2020-05-14 PROCEDURE — 93010 ELECTROCARDIOGRAM REPORT: CPT | Performed by: INTERNAL MEDICINE

## 2020-05-14 PROCEDURE — 71045 X-RAY EXAM CHEST 1 VIEW: CPT

## 2020-05-14 RX ORDER — LIDOCAINE HYDROCHLORIDE 20 MG/ML
15 SOLUTION OROPHARYNGEAL ONCE
Status: COMPLETED | OUTPATIENT
Start: 2020-05-14 | End: 2020-05-14

## 2020-05-14 RX ORDER — NITROGLYCERIN 0.4 MG/1
0.4 TABLET SUBLINGUAL ONCE
Status: COMPLETED | OUTPATIENT
Start: 2020-05-14 | End: 2020-05-14

## 2020-05-14 RX ORDER — ASPIRIN 81 MG/1
243 TABLET, CHEWABLE ORAL ONCE
Status: COMPLETED | OUTPATIENT
Start: 2020-05-14 | End: 2020-05-14

## 2020-05-14 RX ORDER — MAGNESIUM HYDROXIDE/ALUMINUM HYDROXICE/SIMETHICONE 120; 1200; 1200 MG/30ML; MG/30ML; MG/30ML
30 SUSPENSION ORAL ONCE
Status: COMPLETED | OUTPATIENT
Start: 2020-05-14 | End: 2020-05-14

## 2020-05-14 RX ADMIN — FAMOTIDINE 20 MG: 10 INJECTION INTRAVENOUS at 14:38

## 2020-05-14 RX ADMIN — NITROGLYCERIN 0.4 MG: 0.4 TABLET SUBLINGUAL at 11:49

## 2020-05-14 RX ADMIN — ALUMINUM HYDROXIDE, MAGNESIUM HYDROXIDE, AND SIMETHICONE 30 ML: 200; 200; 20 SUSPENSION ORAL at 14:38

## 2020-05-14 RX ADMIN — LIDOCAINE HYDROCHLORIDE 15 ML: 20 SOLUTION ORAL; TOPICAL at 14:38

## 2020-05-14 RX ADMIN — ASPIRIN 81 MG 243 MG: 81 TABLET ORAL at 11:48

## 2020-05-15 ENCOUNTER — APPOINTMENT (OUTPATIENT)
Dept: CARDIAC REHAB | Facility: HOSPITAL | Age: 65
End: 2020-05-15
Attending: INTERNAL MEDICINE
Payer: COMMERCIAL

## 2020-05-18 ENCOUNTER — APPOINTMENT (OUTPATIENT)
Dept: CARDIAC REHAB | Facility: HOSPITAL | Age: 65
End: 2020-05-18
Attending: INTERNAL MEDICINE
Payer: COMMERCIAL

## 2020-05-19 ENCOUNTER — CLINICAL SUPPORT (OUTPATIENT)
Dept: CARDIAC REHAB | Facility: HOSPITAL | Age: 65
End: 2020-05-19
Attending: INTERNAL MEDICINE
Payer: COMMERCIAL

## 2020-05-19 DIAGNOSIS — Z95.5 S/P CORONARY ARTERY STENT PLACEMENT: ICD-10-CM

## 2020-05-19 PROCEDURE — 93798 PHYS/QHP OP CAR RHAB W/ECG: CPT

## 2020-05-20 ENCOUNTER — APPOINTMENT (OUTPATIENT)
Dept: CARDIAC REHAB | Facility: HOSPITAL | Age: 65
End: 2020-05-20
Attending: INTERNAL MEDICINE
Payer: COMMERCIAL

## 2020-05-21 ENCOUNTER — CLINICAL SUPPORT (OUTPATIENT)
Dept: CARDIAC REHAB | Facility: HOSPITAL | Age: 65
End: 2020-05-21
Attending: INTERNAL MEDICINE
Payer: COMMERCIAL

## 2020-05-21 DIAGNOSIS — Z95.5 S/P CORONARY ARTERY STENT PLACEMENT: ICD-10-CM

## 2020-05-21 PROCEDURE — 93798 PHYS/QHP OP CAR RHAB W/ECG: CPT

## 2020-09-09 PROBLEM — Z95.5 S/P DRUG ELUTING CORONARY STENT PLACEMENT: Status: ACTIVE | Noted: 2020-01-01

## 2020-09-09 PROBLEM — I10 ESSENTIAL HYPERTENSION: Status: ACTIVE | Noted: 2020-01-01

## 2020-09-09 PROBLEM — E78.00 HYPERCHOLESTEROLEMIA: Status: ACTIVE | Noted: 2020-01-01

## 2020-09-09 PROBLEM — I25.10 CORONARY ARTERY DISEASE INVOLVING NATIVE CORONARY ARTERY OF NATIVE HEART WITHOUT ANGINA PECTORIS: Status: ACTIVE | Noted: 2020-01-01

## 2020-09-09 NOTE — PROGRESS NOTES
Cardiology Consultation     Vicki Montgomery  [id number]  1955  [facility] [address] [address] [phone]    1  Coronary artery disease involving native coronary artery of native heart without angina pectoris     2  S/P drug eluting coronary stent placement     3  Hypercholesterolemia     4  Essential hypertension         HPI:    Mr Selma Delvalle is a 62-year-old male with a history of CAD, hypertension and hypercholesterolemia who comes in today for consultation and to establish care  Vikas Nazario had by records an LAD stent in January of 2019  He had a stress nuclear study in January of 2020 through the [address] [place] [address] that suggested a trace area of lateral ischemia, but was otherwise normal   Echocardiogram shows normal LV systolic function  He completed cardiac rehabilitation in our health system after moving here  Vikas Nazario went to the ER in May with symptoms of atypical chest pain, and workup was unremarkable  He was not admitted  Troponins and ECGs were normal   He described a sharp chest pain and generalized weakness  This is not repeated itself and he has been chest pain-free since  He is active without limitations or symptoms  He tells me he goes for walks between 3 and 10 miles at a time, and can do this without any symptoms of chest pain or shortness of breath  He denies any signs or symptoms of CHF  He denies palpitations, lightheadedness or any syncope  He does harp with intermittent vertigo      Patient Active Problem List   Diagnosis    Cervical spondylosis without myelopathy    Lumbar degenerative disc disease    Sacroiliitis (HCC)    Impingement syndrome of left shoulder    Osteoarthritis of glenohumeral joint, left    Neck pain    Cervical radiculopathy    Cervical spinal stenosis    Chronic pain syndrome    S/P shoulder replacement, right    Chronic right shoulder pain    Coronary artery disease involving native coronary artery of native heart without angina pectoris    S/P drug eluting coronary stent placement    Essential hypertension    Hypercholesterolemia     Past Medical History:   Diagnosis Date    Alzheimer disease ([facility] [address] )     Arthritis     CAD (coronary artery disease)     Chronic right shoulder pain     Hyperlipidemia     Hypertension     Psoriasis     Sleep apnea     uses C pap     Social History     Socioeconomic History    Marital status: /Civil Union     Spouse name: Not on file    Number of children: Not on file    Years of education: Not on file    Highest education level: Not on file   Occupational History    Not on file   Social Needs    Financial resource strain: Not on file    Food insecurity     Worry: Not on file     Inability: Not on file   [employer] needs     Medical: Not on file     Non-medical: Not on file   Tobacco Use    Smoking status: Former Smoker    Smokeless tobacco: Never Used    Tobacco comment: quit " a couple years ago"    Substance and Sexual Activity    Alcohol use: Yes     Frequency: Monthly or less    Drug use: Never    Sexual activity: Not on file   Lifestyle    Physical activity     Days per week: Not on file     Minutes per session: Not on file    Stress: Not on file   Relationships    Social connections     Talks on phone: Not on file     Gets together: Not on file     Attends Muslim service: Not on file     Active member of club or organization: Not on file     Attends meetings of clubs or organizations: Not on file     Relationship status: Not on file    Intimate partner violence     Fear of current or ex partner: Not on file     Emotionally abused: Not on file     Physically abused: Not on file     Forced sexual activity: Not on file   Other Topics Concern    Not on file   Social History Narrative    Not on file      Family History   Problem Relation Age of Onset    Colon cancer Mother     Dementia Mother     Prostate cancer Father     Heart disease Father      Past Surgical History:   Procedure Laterality Date    CHOLECYSTECTOMY      COLONOSCOPY      CORONARY ANGIOPLASTY WITH STENT PLACEMENT Left     FL GUIDED NEEDLE PLAC BX/ASP/INJ  2/24/2020    FL INJECTION RIGHT SHOULDER (ARTHROGRAM)  2/24/2020    KNEE SURGERY Right 05/2017    replacement    SHOULDER SURGERY Right 11/2017    replacement       Current Outpatient Medications:     aspirin (ASPIRIN 81) 81 mg EC tablet, Take 81 mg by mouth daily , Disp: , Rfl:     atorvastatin (LIPITOR) 80 mg tablet, Take 80 mg by mouth daily , Disp: , Rfl:     Azelastine-Fluticasone 137-50 MCG/ACT SUSP, 1 spray by Each Nare route as needed , Disp: , Rfl:     Cetirizine HCl 10 MG CAPS, Take 10 mg by mouth daily , Disp: , Rfl:     clopidogrel (PLAVIX) 75 mg tablet, TAKE ONE TABLET BY MOUTH EVERY DAY TO PREVENT BLOOD CLOTS (SAME AS PLAVIX), Disp: , Rfl:     cyclobenzaprine (FLEXERIL) 10 mg tablet, Take 10 mg by mouth daily at bedtime , Disp: , Rfl:     donepezil (ARICEPT) 10 mg tablet, Take 10 mg by mouth daily at bedtime, Disp: , Rfl:     hydrochlorothiazide (MICROZIDE) 12 5 mg capsule, TAKE ONE TABLET BY MOUTH EVERY DAY FOR BLOOD PRESSURE, Disp: , Rfl:     isosorbide mononitrate (IMDUR) 30 mg 24 hr tablet, Take 30 mg by mouth daily, Disp: , Rfl:     metoprolol succinate (TOPROL-XL) 50 mg 24 hr tablet, Take 25 mg by mouth daily , Disp: , Rfl:     Multiple Vitamin (MULTIVITAMIN) tablet, Take 1 tablet by mouth daily, Disp: , Rfl:     naproxen sodium (ANAPROX) 275 MG tablet, Take 275 mg by mouth 2 (two) times a day with meals, Disp: , Rfl:     pantoprazole (PROTONIX) 20 mg tablet, Take 20 mg by mouth daily , Disp: , Rfl:     sertraline (ZOLOFT) 100 mg tablet, Take 50 mg by mouth daily at bedtime , Disp: , Rfl:     nitroglycerin (NITROSTAT) 0 4 mg SL tablet, Place 0 4 mg under the tongue as needed for chest pain , Disp: , Rfl:   No Known Allergies  Vitals:    09/09/20 0924   BP: 136/88   BP Location: Left arm   Patient Position: Sitting   Cuff Size: Standard   Pulse: 60   Temp: 98 4 °F (36 9 °C)   Weight: 117 kg (259 lb)   Height: 6' 3" (1 905 m)       Labs:  Lab Results   Component Value Date    K 3 4 (L) 05/14/2020     05/14/2020    CO2 30 05/14/2020    BUN 24 05/14/2020    CREATININE 1 31 (H) 05/14/2020    CALCIUM 8 9 05/14/2020     Lab Results   Component Value Date    WBC 8 55 05/14/2020    HGB 15 6 05/14/2020    HCT 47 6 05/14/2020    MCV 89 05/14/2020     05/14/2020     Imaging:  ECG from his emergency room visit in May showed normal sinus rhythm and was within normal limits  Review of Systems:  Review of Systems   Constitutional: Negative  HENT: Negative  Eyes: Negative  Respiratory: Negative  Cardiovascular: Negative  Gastrointestinal: Negative  Musculoskeletal: Positive for arthralgias  Skin: Negative  Allergic/Immunologic: Negative  Neurological: Positive for dizziness  Hematological: Negative  Psychiatric/Behavioral: Negative  All other systems reviewed and are negative  Vitals:    09/09/20 0924   BP: 136/88   BP Location: Left arm   Patient Position: Sitting   Cuff Size: Standard   Pulse: 60   Temp: 98 4 °F (36 9 °C)   Weight: 117 kg (259 lb)   Height: 6' 3" (1 905 m)     Physical Exam:  Physical Exam  Constitutional:       Appearance: He is well-developed  HENT:      Head: Normocephalic and atraumatic  Eyes:      General: No scleral icterus  Right eye: No discharge  Left eye: No discharge  Pupils: Pupils are equal, round, and reactive to light  Neck:      Musculoskeletal: Normal range of motion and neck supple  Thyroid: No thyromegaly  Vascular: No JVD  Cardiovascular:      Rate and Rhythm: Normal rate and regular rhythm  No extrasystoles are present  Pulses: Normal pulses  No decreased pulses        Heart sounds: Normal heart sounds, S1 normal and S2 normal  No murmur  No friction rub  No gallop  Pulmonary:      Effort: Pulmonary effort is normal  No respiratory distress  Breath sounds: Normal breath sounds  No wheezing or rales  Abdominal:      General: Bowel sounds are normal  There is no distension  Palpations: Abdomen is soft  Tenderness: There is no abdominal tenderness  Musculoskeletal: Normal range of motion  General: No tenderness or deformity  Skin:     General: Skin is warm and dry  Findings: No rash  Neurological:      Mental Status: He is alert and oriented to person, place, and time  Cranial Nerves: No cranial nerve deficit  Psychiatric:         Thought Content: Thought content normal          Judgment: Judgment normal          Discussion/Summary:    1  CAD - Vito Baltazar is here to establish care given his history of CAD, and by report LAD stenting in January of 2019  He remains on dual anti-platelet therapy  Although this is not a necessity, he is tolerating this and we will continue it at least until the 2 year del  He also is on Imdur, I suspect that this is because of his mildly abnormal stress nuclear study earlier this year  He is without symptoms of angina and we could consider stopping this over the next year  No other changes were made to his medical therapy  He completed cardiac rehabilitation  We will see him back in 6 months  2   Hypertension - His blood pressure is under good control  No changes were made  He should periodically check his blood pressure at home  3   Hypercholesterolemia - He is overdue for blood work, and we ordered both a CMP and fasting lipid profile  His CMP from his emergency room visit did show a mildly elevated creatinine  We will otherwise see him back in 6 months  Counseling / Coordination of Care  Total office time spent today 40 minutes    Greater than 50% of total time was spent with the patient and / or family counseling and / or coordination of care

## 2020-10-15 PROBLEM — Z98.61 HISTORY OF PTCA: Status: ACTIVE | Noted: 2020-01-01

## 2020-10-15 PROBLEM — G47.30 SLEEP APNEA: Status: ACTIVE | Noted: 2020-01-01

## 2020-10-15 PROBLEM — D68.9 COAGULATION DISORDER (HCC): Status: ACTIVE | Noted: 2020-01-01

## 2020-10-16 PROBLEM — K21.9 GASTROESOPHAGEAL REFLUX DISEASE: Status: ACTIVE | Noted: 2020-01-01

## 2020-12-03 PROBLEM — Z09 POSTOPERATIVE EXAMINATION: Status: ACTIVE | Noted: 2020-01-01

## 2021-01-01 ENCOUNTER — TELEPHONE (OUTPATIENT)
Dept: FAMILY MEDICINE CLINIC | Facility: CLINIC | Age: 66
End: 2021-01-01

## 2021-01-01 ENCOUNTER — OFFICE VISIT (OUTPATIENT)
Dept: FAMILY MEDICINE CLINIC | Facility: CLINIC | Age: 66
End: 2021-01-01
Payer: COMMERCIAL

## 2021-01-01 ENCOUNTER — OFFICE VISIT (OUTPATIENT)
Dept: PHYSICAL THERAPY | Facility: CLINIC | Age: 66
End: 2021-01-01
Payer: COMMERCIAL

## 2021-01-01 ENCOUNTER — TELEPHONE (OUTPATIENT)
Dept: ADMINISTRATIVE | Facility: OTHER | Age: 66
End: 2021-01-01

## 2021-01-01 ENCOUNTER — APPOINTMENT (OUTPATIENT)
Dept: PHYSICAL THERAPY | Facility: CLINIC | Age: 66
End: 2021-01-01
Payer: COMMERCIAL

## 2021-01-01 ENCOUNTER — OFFICE VISIT (OUTPATIENT)
Dept: URGENT CARE | Facility: CLINIC | Age: 66
End: 2021-01-01
Payer: COMMERCIAL

## 2021-01-01 ENCOUNTER — ANESTHESIA EVENT (INPATIENT)
Dept: PERIOP | Facility: HOSPITAL | Age: 66
DRG: 064 | End: 2021-01-01
Payer: COMMERCIAL

## 2021-01-01 ENCOUNTER — HOSPITAL ENCOUNTER (EMERGENCY)
Facility: HOSPITAL | Age: 66
End: 2021-05-21
Attending: EMERGENCY MEDICINE
Payer: COMMERCIAL

## 2021-01-01 ENCOUNTER — APPOINTMENT (EMERGENCY)
Dept: CT IMAGING | Facility: HOSPITAL | Age: 66
End: 2021-01-01
Payer: COMMERCIAL

## 2021-01-01 ENCOUNTER — APPOINTMENT (EMERGENCY)
Dept: RADIOLOGY | Facility: HOSPITAL | Age: 66
End: 2021-01-01
Payer: COMMERCIAL

## 2021-01-01 ENCOUNTER — APPOINTMENT (INPATIENT)
Dept: GASTROENTEROLOGY | Facility: HOSPITAL | Age: 66
DRG: 064 | End: 2021-01-01
Payer: COMMERCIAL

## 2021-01-01 ENCOUNTER — APPOINTMENT (INPATIENT)
Dept: RADIOLOGY | Facility: HOSPITAL | Age: 66
DRG: 064 | End: 2021-01-01
Payer: COMMERCIAL

## 2021-01-01 ENCOUNTER — HOSPITAL ENCOUNTER (INPATIENT)
Facility: HOSPITAL | Age: 66
LOS: 2 days | DRG: 064 | End: 2021-05-23
Attending: SURGERY | Admitting: EMERGENCY MEDICINE
Payer: COMMERCIAL

## 2021-01-01 ENCOUNTER — TELEPHONE (OUTPATIENT)
Dept: OTHER | Facility: OTHER | Age: 66
End: 2021-01-01

## 2021-01-01 ENCOUNTER — APPOINTMENT (EMERGENCY)
Dept: RADIOLOGY | Facility: HOSPITAL | Age: 66
DRG: 064 | End: 2021-01-01
Payer: COMMERCIAL

## 2021-01-01 VITALS
RESPIRATION RATE: 16 BRPM | SYSTOLIC BLOOD PRESSURE: 120 MMHG | BODY MASS INDEX: 32.73 KG/M2 | WEIGHT: 255 LBS | HEIGHT: 74 IN | TEMPERATURE: 97.4 F | HEART RATE: 66 BPM | DIASTOLIC BLOOD PRESSURE: 88 MMHG | OXYGEN SATURATION: 97 %

## 2021-01-01 VITALS
OXYGEN SATURATION: 96 % | TEMPERATURE: 97.1 F | WEIGHT: 256.4 LBS | RESPIRATION RATE: 16 BRPM | HEART RATE: 69 BPM | BODY MASS INDEX: 31.88 KG/M2 | HEIGHT: 75 IN | DIASTOLIC BLOOD PRESSURE: 76 MMHG | SYSTOLIC BLOOD PRESSURE: 118 MMHG

## 2021-01-01 VITALS
SYSTOLIC BLOOD PRESSURE: 165 MMHG | OXYGEN SATURATION: 100 % | TEMPERATURE: 96.4 F | RESPIRATION RATE: 18 BRPM | WEIGHT: 200 LBS | DIASTOLIC BLOOD PRESSURE: 98 MMHG | HEART RATE: 80 BPM

## 2021-01-01 DIAGNOSIS — G89.18 POST-OPERATIVE PAIN: ICD-10-CM

## 2021-01-01 DIAGNOSIS — G93.5 UNCAL HERNIATION (HCC): ICD-10-CM

## 2021-01-01 DIAGNOSIS — R29.3 POSTURE ABNORMALITY: ICD-10-CM

## 2021-01-01 DIAGNOSIS — M54.12 CERVICAL RADICULOPATHY: Primary | ICD-10-CM

## 2021-01-01 DIAGNOSIS — F02.80 ALZHEIMER'S DISEASE OF OTHER ONSET WITHOUT BEHAVIORAL DISTURBANCE: Primary | ICD-10-CM

## 2021-01-01 DIAGNOSIS — Z09 POSTOPERATIVE EXAMINATION: ICD-10-CM

## 2021-01-01 DIAGNOSIS — I62.00 SUBDURAL HEMORRHAGE (HCC): Primary | ICD-10-CM

## 2021-01-01 DIAGNOSIS — G93.89 MIDLINE SHIFT OF BRAIN DUE TO HEMATOMA (HCC): ICD-10-CM

## 2021-01-01 DIAGNOSIS — S06.2X0S MIDLINE SHIFT OF BRAIN DUE TO HEMATOMA (HCC): ICD-10-CM

## 2021-01-01 DIAGNOSIS — G30.8 ALZHEIMER'S DISEASE OF OTHER ONSET WITHOUT BEHAVIORAL DISTURBANCE: Primary | ICD-10-CM

## 2021-01-01 DIAGNOSIS — I60.9 SUBARACHNOID HEMORRHAGE (HCC): ICD-10-CM

## 2021-01-01 DIAGNOSIS — S06.5X9A SDH (SUBDURAL HEMATOMA) (HCC): Primary | ICD-10-CM

## 2021-01-01 DIAGNOSIS — H93.13 TINNITUS OF BOTH EARS: ICD-10-CM

## 2021-01-01 DIAGNOSIS — Z23 ENCOUNTER FOR IMMUNIZATION: ICD-10-CM

## 2021-01-01 DIAGNOSIS — H69.83 ACUTE DYSFUNCTION OF EUSTACHIAN TUBE, BILATERAL: Primary | ICD-10-CM

## 2021-01-01 DIAGNOSIS — R51.9 SINUS HEADACHE: ICD-10-CM

## 2021-01-01 LAB
ABO GROUP BLD: NORMAL
ALBUMIN SERPL BCP-MCNC: 3 G/DL (ref 3.5–5)
ALBUMIN SERPL BCP-MCNC: 3.1 G/DL (ref 3.5–5)
ALBUMIN SERPL BCP-MCNC: 3.2 G/DL (ref 3.5–5)
ALBUMIN SERPL BCP-MCNC: 3.5 G/DL (ref 3.5–5)
ALBUMIN SERPL BCP-MCNC: 3.7 G/DL (ref 3.5–5)
ALP SERPL-CCNC: 107 U/L (ref 46–116)
ALP SERPL-CCNC: 111 U/L (ref 46–116)
ALP SERPL-CCNC: 113 U/L (ref 46–116)
ALP SERPL-CCNC: 115 U/L (ref 46–116)
ALP SERPL-CCNC: 116 U/L (ref 46–116)
ALT SERPL W P-5'-P-CCNC: 28 U/L (ref 12–78)
ALT SERPL W P-5'-P-CCNC: 29 U/L (ref 12–78)
ALT SERPL W P-5'-P-CCNC: 30 U/L (ref 12–78)
ALT SERPL W P-5'-P-CCNC: 31 U/L (ref 12–78)
ALT SERPL W P-5'-P-CCNC: 33 U/L (ref 12–78)
AMYLASE SERPL-CCNC: 28 IU/L (ref 25–115)
AMYLASE SERPL-CCNC: 29 IU/L (ref 25–115)
ANION GAP SERPL CALCULATED.3IONS-SCNC: 11 MMOL/L (ref 4–13)
ANION GAP SERPL CALCULATED.3IONS-SCNC: 5 MMOL/L (ref 4–13)
ANION GAP SERPL CALCULATED.3IONS-SCNC: 6 MMOL/L (ref 4–13)
ANION GAP SERPL CALCULATED.3IONS-SCNC: 7 MMOL/L (ref 4–13)
ANION GAP SERPL CALCULATED.3IONS-SCNC: 7 MMOL/L (ref 4–13)
ANION GAP SERPL CALCULATED.3IONS-SCNC: 8 MMOL/L (ref 4–13)
ANION GAP SERPL CALCULATED.3IONS-SCNC: 8 MMOL/L (ref 4–13)
ANION GAP SERPL CALCULATED.3IONS-SCNC: 9 MMOL/L (ref 4–13)
APTT PPP: 27 SECONDS (ref 23–37)
APTT PPP: 27 SECONDS (ref 23–37)
APTT PPP: 29 SECONDS (ref 23–37)
APTT PPP: 36 SECONDS (ref 23–37)
APTT PPP: 46 SECONDS (ref 23–37)
ARTERIAL PATENCY WRIST A: NO
ARTERIAL PATENCY WRIST A: YES
ARTERIAL PATENCY WRIST A: YES
AST SERPL W P-5'-P-CCNC: 25 U/L (ref 5–45)
AST SERPL W P-5'-P-CCNC: 26 U/L (ref 5–45)
AST SERPL W P-5'-P-CCNC: 56 U/L (ref 5–45)
AST SERPL W P-5'-P-CCNC: 68 U/L (ref 5–45)
AST SERPL W P-5'-P-CCNC: 79 U/L (ref 5–45)
ATRIAL RATE: 129 BPM
ATRIAL RATE: 73 BPM
ATRIAL RATE: 83 BPM
BACTERIA UR QL AUTO: ABNORMAL /HPF
BACTERIA UR QL AUTO: ABNORMAL /HPF
BASE EXCESS BLDA CALC-SCNC: -0.4 MMOL/L
BASE EXCESS BLDA CALC-SCNC: -0.4 MMOL/L
BASE EXCESS BLDA CALC-SCNC: -1.7 MMOL/L
BASE EXCESS BLDA CALC-SCNC: -2.8 MMOL/L
BASE EXCESS BLDA CALC-SCNC: -3.5 MMOL/L
BASE EXCESS BLDA CALC-SCNC: -3.5 MMOL/L
BASE EXCESS BLDA CALC-SCNC: -4.6 MMOL/L
BASE EXCESS BLDA CALC-SCNC: 0 MMOL/L (ref -2–3)
BASE EXCESS BLDA CALC-SCNC: 0.1 MMOL/L
BASE EXCESS BLDA CALC-SCNC: 2 MMOL/L (ref -2–3)
BASE EXCESS BLDA CALC-SCNC: 2 MMOL/L (ref -2–3)
BASOPHILS # BLD AUTO: 0.04 THOUSANDS/ΜL (ref 0–0.1)
BASOPHILS # BLD AUTO: 0.09 THOUSANDS/ΜL (ref 0–0.1)
BASOPHILS # BLD AUTO: 0.1 THOUSANDS/ΜL (ref 0–0.1)
BASOPHILS NFR BLD AUTO: 0 % (ref 0–1)
BASOPHILS NFR BLD AUTO: 1 % (ref 0–1)
BASOPHILS NFR BLD AUTO: 1 % (ref 0–1)
BILIRUB DIRECT SERPL-MCNC: 0.3 MG/DL (ref 0–0.2)
BILIRUB DIRECT SERPL-MCNC: 0.4 MG/DL (ref 0–0.2)
BILIRUB DIRECT SERPL-MCNC: 0.57 MG/DL (ref 0–0.2)
BILIRUB SERPL-MCNC: 0.77 MG/DL (ref 0.2–1)
BILIRUB SERPL-MCNC: 0.92 MG/DL (ref 0.2–1)
BILIRUB SERPL-MCNC: 1.28 MG/DL (ref 0.2–1)
BILIRUB SERPL-MCNC: 1.33 MG/DL (ref 0.2–1)
BILIRUB SERPL-MCNC: 1.41 MG/DL (ref 0.2–1)
BILIRUB UR QL STRIP: ABNORMAL
BILIRUB UR QL STRIP: NEGATIVE
BLD GP AB SCN SERPL QL: NEGATIVE
BODY TEMPERATURE: 100.6 DEGREES FEHRENHEIT
BODY TEMPERATURE: 99 DEGREES FEHRENHEIT
BODY TEMPERATURE: 99.9 DEGREES FEHRENHEIT
BUN SERPL-MCNC: 11 MG/DL (ref 5–25)
BUN SERPL-MCNC: 12 MG/DL (ref 5–25)
BUN SERPL-MCNC: 13 MG/DL (ref 5–25)
BUN SERPL-MCNC: 15 MG/DL (ref 5–25)
BUN SERPL-MCNC: 15 MG/DL (ref 5–25)
BUN SERPL-MCNC: 16 MG/DL (ref 5–25)
BUN SERPL-MCNC: 18 MG/DL (ref 5–25)
BUN SERPL-MCNC: 18 MG/DL (ref 5–25)
CA-I BLD-SCNC: 1.08 MMOL/L (ref 1.12–1.32)
CA-I BLD-SCNC: 1.09 MMOL/L (ref 1.12–1.32)
CA-I BLD-SCNC: 1.1 MMOL/L (ref 1.12–1.32)
CA-I BLD-SCNC: 1.12 MMOL/L (ref 1.12–1.32)
CA-I BLD-SCNC: 1.2 MMOL/L (ref 1.12–1.32)
CA-I BLD-SCNC: 1.21 MMOL/L (ref 1.12–1.32)
CALCIUM ALBUM COR SERPL-MCNC: 9.5 MG/DL (ref 8.3–10.1)
CALCIUM ALBUM COR SERPL-MCNC: 9.6 MG/DL (ref 8.3–10.1)
CALCIUM SERPL-MCNC: 8.4 MG/DL (ref 8.3–10.1)
CALCIUM SERPL-MCNC: 8.6 MG/DL (ref 8.3–10.1)
CALCIUM SERPL-MCNC: 8.8 MG/DL (ref 8.3–10.1)
CALCIUM SERPL-MCNC: 8.8 MG/DL (ref 8.3–10.1)
CALCIUM SERPL-MCNC: 8.9 MG/DL (ref 8.3–10.1)
CALCIUM SERPL-MCNC: 9 MG/DL (ref 8.3–10.1)
CHLORIDE SERPL-SCNC: 105 MMOL/L (ref 100–108)
CHLORIDE SERPL-SCNC: 107 MMOL/L (ref 100–108)
CHLORIDE SERPL-SCNC: 107 MMOL/L (ref 100–108)
CHLORIDE SERPL-SCNC: 109 MMOL/L (ref 100–108)
CHLORIDE SERPL-SCNC: 110 MMOL/L (ref 100–108)
CK MB SERPL-MCNC: 41.4 NG/ML (ref 0–5)
CK MB SERPL-MCNC: 48.4 NG/ML (ref 0–5)
CK MB SERPL-MCNC: 6.4 % (ref 0–2.5)
CK MB SERPL-MCNC: 8.7 % (ref 0–2.5)
CK SERPL-CCNC: 557 U/L (ref 39–308)
CK SERPL-CCNC: 643 U/L (ref 39–308)
CLARITY UR: ABNORMAL
CLARITY UR: ABNORMAL
CO2 SERPL-SCNC: 21 MMOL/L (ref 21–32)
CO2 SERPL-SCNC: 22 MMOL/L (ref 21–32)
CO2 SERPL-SCNC: 25 MMOL/L (ref 21–32)
CO2 SERPL-SCNC: 25 MMOL/L (ref 21–32)
CO2 SERPL-SCNC: 26 MMOL/L (ref 21–32)
CO2 SERPL-SCNC: 26 MMOL/L (ref 21–32)
CO2 SERPL-SCNC: 27 MMOL/L (ref 21–32)
CO2 SERPL-SCNC: 28 MMOL/L (ref 21–32)
COLOR UR: ABNORMAL
COLOR UR: YELLOW
CREAT SERPL-MCNC: 0.85 MG/DL (ref 0.6–1.3)
CREAT SERPL-MCNC: 0.86 MG/DL (ref 0.6–1.3)
CREAT SERPL-MCNC: 0.88 MG/DL (ref 0.6–1.3)
CREAT SERPL-MCNC: 0.89 MG/DL (ref 0.6–1.3)
CREAT SERPL-MCNC: 0.93 MG/DL (ref 0.6–1.3)
CREAT SERPL-MCNC: 1 MG/DL (ref 0.6–1.3)
CREAT SERPL-MCNC: 1.06 MG/DL (ref 0.6–1.3)
CREAT SERPL-MCNC: 1.16 MG/DL (ref 0.6–1.3)
EOSINOPHIL # BLD AUTO: 0 THOUSAND/ΜL (ref 0–0.61)
EOSINOPHIL # BLD AUTO: 0.09 THOUSAND/ΜL (ref 0–0.61)
EOSINOPHIL # BLD AUTO: 0.22 THOUSAND/ΜL (ref 0–0.61)
EOSINOPHIL NFR BLD AUTO: 0 % (ref 0–6)
EOSINOPHIL NFR BLD AUTO: 1 % (ref 0–6)
EOSINOPHIL NFR BLD AUTO: 1 % (ref 0–6)
ERYTHROCYTE [DISTWIDTH] IN BLOOD BY AUTOMATED COUNT: 14.1 % (ref 11.6–15.1)
ERYTHROCYTE [DISTWIDTH] IN BLOOD BY AUTOMATED COUNT: 14.1 % (ref 11.6–15.1)
ERYTHROCYTE [DISTWIDTH] IN BLOOD BY AUTOMATED COUNT: 14.2 % (ref 11.6–15.1)
ERYTHROCYTE [DISTWIDTH] IN BLOOD BY AUTOMATED COUNT: 14.4 % (ref 11.6–15.1)
ERYTHROCYTE [DISTWIDTH] IN BLOOD BY AUTOMATED COUNT: 14.5 % (ref 11.6–15.1)
ERYTHROCYTE [DISTWIDTH] IN BLOOD BY AUTOMATED COUNT: 14.6 % (ref 11.6–15.1)
ERYTHROCYTE [DISTWIDTH] IN BLOOD BY AUTOMATED COUNT: 14.7 % (ref 11.6–15.1)
EST. AVERAGE GLUCOSE BLD GHB EST-MCNC: 120 MG/DL
GFR SERPL CREATININE-BSD FRML MDRD: 66 ML/MIN/1.73SQ M
GFR SERPL CREATININE-BSD FRML MDRD: 74 ML/MIN/1.73SQ M
GFR SERPL CREATININE-BSD FRML MDRD: 79 ML/MIN/1.73SQ M
GFR SERPL CREATININE-BSD FRML MDRD: 86 ML/MIN/1.73SQ M
GFR SERPL CREATININE-BSD FRML MDRD: 90 ML/MIN/1.73SQ M
GFR SERPL CREATININE-BSD FRML MDRD: 91 ML/MIN/1.73SQ M
GFR SERPL CREATININE-BSD FRML MDRD: 92 ML/MIN/1.73SQ M
GFR SERPL CREATININE-BSD FRML MDRD: 92 ML/MIN/1.73SQ M
GGT SERPL-CCNC: 51 U/L (ref 5–85)
GGT SERPL-CCNC: 57 U/L (ref 5–85)
GLUCOSE SERPL-MCNC: 100 MG/DL (ref 65–140)
GLUCOSE SERPL-MCNC: 107 MG/DL (ref 65–140)
GLUCOSE SERPL-MCNC: 110 MG/DL (ref 65–140)
GLUCOSE SERPL-MCNC: 112 MG/DL (ref 65–140)
GLUCOSE SERPL-MCNC: 112 MG/DL (ref 65–140)
GLUCOSE SERPL-MCNC: 114 MG/DL (ref 65–140)
GLUCOSE SERPL-MCNC: 115 MG/DL (ref 65–140)
GLUCOSE SERPL-MCNC: 119 MG/DL (ref 65–140)
GLUCOSE SERPL-MCNC: 119 MG/DL (ref 65–140)
GLUCOSE SERPL-MCNC: 135 MG/DL (ref 65–140)
GLUCOSE SERPL-MCNC: 148 MG/DL (ref 65–140)
GLUCOSE SERPL-MCNC: 149 MG/DL (ref 65–140)
GLUCOSE SERPL-MCNC: 165 MG/DL (ref 65–140)
GLUCOSE UR STRIP-MCNC: ABNORMAL MG/DL
GLUCOSE UR STRIP-MCNC: NEGATIVE MG/DL
HBA1C MFR BLD: 5.8 %
HCO3 BLDA-SCNC: 21.5 MMOL/L (ref 22–28)
HCO3 BLDA-SCNC: 22 MMOL/L (ref 22–28)
HCO3 BLDA-SCNC: 22.1 MMOL/L (ref 22–28)
HCO3 BLDA-SCNC: 23.2 MMOL/L (ref 22–28)
HCO3 BLDA-SCNC: 24.2 MMOL/L (ref 22–28)
HCO3 BLDA-SCNC: 24.9 MMOL/L (ref 22–28)
HCO3 BLDA-SCNC: 24.9 MMOL/L (ref 22–28)
HCO3 BLDA-SCNC: 25.1 MMOL/L (ref 22–28)
HCO3 BLDA-SCNC: 25.4 MMOL/L (ref 24–30)
HCO3 BLDA-SCNC: 28.5 MMOL/L (ref 24–30)
HCO3 BLDA-SCNC: 31.6 MMOL/L (ref 22–28)
HCT VFR BLD AUTO: 47.6 % (ref 36.5–49.3)
HCT VFR BLD AUTO: 49.1 % (ref 36.5–49.3)
HCT VFR BLD AUTO: 49.7 % (ref 36.5–49.3)
HCT VFR BLD AUTO: 50.1 % (ref 36.5–49.3)
HCT VFR BLD AUTO: 50.7 % (ref 36.5–49.3)
HCT VFR BLD AUTO: 51.3 % (ref 36.5–49.3)
HCT VFR BLD AUTO: 51.7 % (ref 36.5–49.3)
HCT VFR BLD CALC: 49 % (ref 36.5–49.3)
HCT VFR BLD CALC: 50 % (ref 36.5–49.3)
HCT VFR BLD CALC: 51 % (ref 36.5–49.3)
HGB BLD-MCNC: 15.6 G/DL (ref 12–17)
HGB BLD-MCNC: 15.9 G/DL (ref 12–17)
HGB BLD-MCNC: 16 G/DL (ref 12–17)
HGB BLD-MCNC: 16.2 G/DL (ref 12–17)
HGB BLD-MCNC: 16.4 G/DL (ref 12–17)
HGB BLD-MCNC: 16.5 G/DL (ref 12–17)
HGB BLD-MCNC: 16.7 G/DL (ref 12–17)
HGB BLDA-MCNC: 16.7 G/DL (ref 12–17)
HGB BLDA-MCNC: 17 G/DL (ref 12–17)
HGB BLDA-MCNC: 17.3 G/DL (ref 12–17)
HGB UR QL STRIP.AUTO: ABNORMAL
HGB UR QL STRIP.AUTO: ABNORMAL
HOLD SPECIMEN: NORMAL
HOLD SPECIMEN: NORMAL
HOROWITZ INDEX BLDA+IHG-RTO: 100 MM[HG]
HOROWITZ INDEX BLDA+IHG-RTO: 40 MM[HG]
HYALINE CASTS #/AREA URNS LPF: ABNORMAL /LPF
IMM GRANULOCYTES # BLD AUTO: 0.09 THOUSAND/UL (ref 0–0.2)
IMM GRANULOCYTES # BLD AUTO: 0.1 THOUSAND/UL (ref 0–0.2)
IMM GRANULOCYTES # BLD AUTO: 0.17 THOUSAND/UL (ref 0–0.2)
IMM GRANULOCYTES NFR BLD AUTO: 1 % (ref 0–2)
INR PPP: 0.94 (ref 0.84–1.19)
INR PPP: 0.99 (ref 0.84–1.19)
INR PPP: 0.99 (ref 0.84–1.19)
INR PPP: 1.15 (ref 0.84–1.19)
INR PPP: 1.16 (ref 0.84–1.19)
KETONES UR STRIP-MCNC: ABNORMAL MG/DL
KETONES UR STRIP-MCNC: ABNORMAL MG/DL
LDH SERPL-CCNC: 231 U/L (ref 81–234)
LDH SERPL-CCNC: 283 U/L (ref 81–234)
LEUKOCYTE ESTERASE UR QL STRIP: ABNORMAL
LEUKOCYTE ESTERASE UR QL STRIP: ABNORMAL
LIPASE SERPL-CCNC: 28 U/L (ref 73–393)
LIPASE SERPL-CCNC: 31 U/L (ref 73–393)
LYMPHOCYTES # BLD AUTO: 0.91 THOUSANDS/ΜL (ref 0.6–4.47)
LYMPHOCYTES # BLD AUTO: 1.23 THOUSANDS/ΜL (ref 0.6–4.47)
LYMPHOCYTES # BLD AUTO: 1.93 THOUSANDS/ΜL (ref 0.6–4.47)
LYMPHOCYTES NFR BLD AUTO: 12 % (ref 14–44)
LYMPHOCYTES NFR BLD AUTO: 5 % (ref 14–44)
LYMPHOCYTES NFR BLD AUTO: 7 % (ref 14–44)
MAGNESIUM SERPL-MCNC: 2.1 MG/DL (ref 1.6–2.6)
MAGNESIUM SERPL-MCNC: 2.2 MG/DL (ref 1.6–2.6)
MAGNESIUM SERPL-MCNC: 2.2 MG/DL (ref 1.6–2.6)
MAGNESIUM SERPL-MCNC: 2.3 MG/DL (ref 1.6–2.6)
MCH RBC QN AUTO: 28.6 PG (ref 26.8–34.3)
MCH RBC QN AUTO: 28.9 PG (ref 26.8–34.3)
MCH RBC QN AUTO: 29.2 PG (ref 26.8–34.3)
MCH RBC QN AUTO: 29.2 PG (ref 26.8–34.3)
MCH RBC QN AUTO: 29.3 PG (ref 26.8–34.3)
MCH RBC QN AUTO: 29.3 PG (ref 26.8–34.3)
MCH RBC QN AUTO: 29.4 PG (ref 26.8–34.3)
MCHC RBC AUTO-ENTMCNC: 32 G/DL (ref 31.4–37.4)
MCHC RBC AUTO-ENTMCNC: 32 G/DL (ref 31.4–37.4)
MCHC RBC AUTO-ENTMCNC: 32.2 G/DL (ref 31.4–37.4)
MCHC RBC AUTO-ENTMCNC: 32.3 G/DL (ref 31.4–37.4)
MCHC RBC AUTO-ENTMCNC: 32.4 G/DL (ref 31.4–37.4)
MCHC RBC AUTO-ENTMCNC: 32.8 G/DL (ref 31.4–37.4)
MCHC RBC AUTO-ENTMCNC: 32.9 G/DL (ref 31.4–37.4)
MCV RBC AUTO: 89 FL (ref 82–98)
MCV RBC AUTO: 89 FL (ref 82–98)
MCV RBC AUTO: 90 FL (ref 82–98)
MCV RBC AUTO: 91 FL (ref 82–98)
MCV RBC AUTO: 92 FL (ref 82–98)
MONOCYTES # BLD AUTO: 0.48 THOUSAND/ΜL (ref 0.17–1.22)
MONOCYTES # BLD AUTO: 1.25 THOUSAND/ΜL (ref 0.17–1.22)
MONOCYTES # BLD AUTO: 1.84 THOUSAND/ΜL (ref 0.17–1.22)
MONOCYTES NFR BLD AUTO: 10 % (ref 4–12)
MONOCYTES NFR BLD AUTO: 2 % (ref 4–12)
MONOCYTES NFR BLD AUTO: 8 % (ref 4–12)
NEUTROPHILS # BLD AUTO: 12.59 THOUSANDS/ΜL (ref 1.85–7.62)
NEUTROPHILS # BLD AUTO: 14.53 THOUSANDS/ΜL (ref 1.85–7.62)
NEUTROPHILS # BLD AUTO: 18.48 THOUSANDS/ΜL (ref 1.85–7.62)
NEUTS SEG NFR BLD AUTO: 77 % (ref 43–75)
NEUTS SEG NFR BLD AUTO: 80 % (ref 43–75)
NEUTS SEG NFR BLD AUTO: 92 % (ref 43–75)
NITRITE UR QL STRIP: ABNORMAL
NITRITE UR QL STRIP: NEGATIVE
NON-SQ EPI CELLS URNS QL MICRO: ABNORMAL /HPF
NON-SQ EPI CELLS URNS QL MICRO: ABNORMAL /HPF
NRBC BLD AUTO-RTO: 0 /100 WBCS
O2 CT BLDA-SCNC: 21.8 ML/DL (ref 16–23)
O2 CT BLDA-SCNC: 22.1 ML/DL (ref 16–23)
O2 CT BLDA-SCNC: 22.4 ML/DL (ref 16–23)
O2 CT BLDA-SCNC: 22.7 ML/DL (ref 16–23)
O2 CT BLDA-SCNC: 23.3 ML/DL (ref 16–23)
O2 CT BLDA-SCNC: 24 ML/DL (ref 16–23)
O2 CT BLDA-SCNC: 24.3 ML/DL (ref 16–23)
O2 CT BLDA-SCNC: 26.4 ML/DL (ref 16–23)
OXYHGB MFR BLDA: 95.3 % (ref 94–97)
OXYHGB MFR BLDA: 95.3 % (ref 94–97)
OXYHGB MFR BLDA: 97 % (ref 94–97)
OXYHGB MFR BLDA: 97.9 % (ref 94–97)
OXYHGB MFR BLDA: 98.3 % (ref 94–97)
OXYHGB MFR BLDA: 98.4 % (ref 94–97)
OXYHGB MFR BLDA: 98.5 % (ref 94–97)
OXYHGB MFR BLDA: 98.6 % (ref 94–97)
P AXIS: 56 DEGREES
P AXIS: 68 DEGREES
P AXIS: 70 DEGREES
PCO2 BLD: 27 MMOL/L (ref 21–32)
PCO2 BLD: 30 MMOL/L (ref 21–32)
PCO2 BLD: 34 MMOL/L (ref 21–32)
PCO2 BLD: 44.6 MM HG (ref 42–50)
PCO2 BLD: 49.4 MM HG (ref 42–50)
PCO2 BLD: 71.1 MM HG (ref 36–44)
PCO2 BLDA: 38.7 MM HG (ref 36–44)
PCO2 BLDA: 40.8 MM HG (ref 36–44)
PCO2 BLDA: 41.2 MM HG (ref 36–44)
PCO2 BLDA: 43 MM HG (ref 36–44)
PCO2 BLDA: 43.9 MM HG (ref 36–44)
PCO2 BLDA: 44.7 MM HG (ref 36–44)
PCO2 BLDA: 44.8 MM HG (ref 36–44)
PCO2 BLDA: 46.4 MM HG (ref 36–44)
PEEP RESPIRATORY: 5 CM[H2O]
PEEP RESPIRATORY: 6 CM[H2O]
PH BLD: 7.25 [PH] (ref 7.35–7.45)
PH BLD: 7.36 [PH] (ref 7.3–7.4)
PH BLD: 7.37 [PH] (ref 7.3–7.4)
PH BLDA: 7.3 [PH] (ref 7.35–7.45)
PH BLDA: 7.33 [PH] (ref 7.35–7.45)
PH BLDA: 7.34 [PH] (ref 7.35–7.45)
PH BLDA: 7.35 [PH] (ref 7.35–7.45)
PH BLDA: 7.36 [PH] (ref 7.35–7.45)
PH BLDA: 7.38 [PH] (ref 7.35–7.45)
PH BLDA: 7.38 [PH] (ref 7.35–7.45)
PH BLDA: 7.4 [PH] (ref 7.35–7.45)
PH UR STRIP.AUTO: 8 [PH]
PH UR STRIP.AUTO: ABNORMAL [PH]
PHOSPHATE SERPL-MCNC: 2.1 MG/DL (ref 2.3–4.1)
PHOSPHATE SERPL-MCNC: 2.4 MG/DL (ref 2.3–4.1)
PHOSPHATE SERPL-MCNC: 2.4 MG/DL (ref 2.3–4.1)
PHOSPHATE SERPL-MCNC: 3.4 MG/DL (ref 2.3–4.1)
PLATELET # BLD AUTO: 232 THOUSANDS/UL (ref 149–390)
PLATELET # BLD AUTO: 233 THOUSANDS/UL (ref 149–390)
PLATELET # BLD AUTO: 245 THOUSANDS/UL (ref 149–390)
PLATELET # BLD AUTO: 250 THOUSANDS/UL (ref 149–390)
PLATELET # BLD AUTO: 256 THOUSANDS/UL (ref 149–390)
PLATELET # BLD AUTO: 268 THOUSANDS/UL (ref 149–390)
PLATELET # BLD AUTO: 275 THOUSANDS/UL (ref 149–390)
PMV BLD AUTO: 10 FL (ref 8.9–12.7)
PMV BLD AUTO: 10.1 FL (ref 8.9–12.7)
PMV BLD AUTO: 10.2 FL (ref 8.9–12.7)
PMV BLD AUTO: 10.4 FL (ref 8.9–12.7)
PMV BLD AUTO: 9.8 FL (ref 8.9–12.7)
PO2 BLD: 225 MM HG (ref 35–45)
PO2 BLD: 44 MM HG (ref 35–45)
PO2 BLD: 67 MM HG (ref 75–129)
PO2 BLDA: 100.9 MM HG (ref 75–129)
PO2 BLDA: 151.3 MM HG (ref 75–129)
PO2 BLDA: 244.1 MM HG (ref 75–129)
PO2 BLDA: 254.9 MM HG (ref 75–129)
PO2 BLDA: 287.7 MM HG (ref 75–129)
PO2 BLDA: 351.8 MM HG (ref 75–129)
PO2 BLDA: 82.3 MM HG (ref 75–129)
PO2 BLDA: 89 MM HG (ref 75–129)
POTASSIUM BLD-SCNC: 3.4 MMOL/L (ref 3.5–5.3)
POTASSIUM BLD-SCNC: 3.6 MMOL/L (ref 3.5–5.3)
POTASSIUM BLD-SCNC: 3.7 MMOL/L (ref 3.5–5.3)
POTASSIUM SERPL-SCNC: 3.3 MMOL/L (ref 3.5–5.3)
POTASSIUM SERPL-SCNC: 3.4 MMOL/L (ref 3.5–5.3)
POTASSIUM SERPL-SCNC: 3.6 MMOL/L (ref 3.5–5.3)
POTASSIUM SERPL-SCNC: 3.7 MMOL/L (ref 3.5–5.3)
POTASSIUM SERPL-SCNC: 3.7 MMOL/L (ref 3.5–5.3)
POTASSIUM SERPL-SCNC: 3.8 MMOL/L (ref 3.5–5.3)
POTASSIUM SERPL-SCNC: 3.8 MMOL/L (ref 3.5–5.3)
POTASSIUM SERPL-SCNC: 3.9 MMOL/L (ref 3.5–5.3)
PR INTERVAL: 146 MS
PR INTERVAL: 146 MS
PR INTERVAL: 156 MS
PROT SERPL-MCNC: 6.4 G/DL (ref 6.4–8.2)
PROT SERPL-MCNC: 6.5 G/DL (ref 6.4–8.2)
PROT SERPL-MCNC: 6.8 G/DL (ref 6.4–8.2)
PROT SERPL-MCNC: 7 G/DL (ref 6.4–8.2)
PROT SERPL-MCNC: 7.1 G/DL (ref 6.4–8.2)
PROT UR STRIP-MCNC: ABNORMAL MG/DL
PROT UR STRIP-MCNC: ABNORMAL MG/DL
PROTHROMBIN TIME: 12.6 SECONDS (ref 11.6–14.5)
PROTHROMBIN TIME: 13.1 SECONDS (ref 11.6–14.5)
PROTHROMBIN TIME: 13.1 SECONDS (ref 11.6–14.5)
PROTHROMBIN TIME: 14.7 SECONDS (ref 11.6–14.5)
PROTHROMBIN TIME: 14.8 SECONDS (ref 11.6–14.5)
QRS AXIS: 123 DEGREES
QRS AXIS: 70 DEGREES
QRS AXIS: 73 DEGREES
QRSD INTERVAL: 83 MS
QRSD INTERVAL: 96 MS
QRSD INTERVAL: 98 MS
QT INTERVAL: 300 MS
QT INTERVAL: 370 MS
QT INTERVAL: 384 MS
QTC INTERVAL: 423 MS
QTC INTERVAL: 434 MS
QTC INTERVAL: 440 MS
RBC # BLD AUTO: 5.3 MILLION/UL (ref 3.88–5.62)
RBC # BLD AUTO: 5.48 MILLION/UL (ref 3.88–5.62)
RBC # BLD AUTO: 5.51 MILLION/UL (ref 3.88–5.62)
RBC # BLD AUTO: 5.52 MILLION/UL (ref 3.88–5.62)
RBC # BLD AUTO: 5.64 MILLION/UL (ref 3.88–5.62)
RBC # BLD AUTO: 5.72 MILLION/UL (ref 3.88–5.62)
RBC # BLD AUTO: 5.73 MILLION/UL (ref 3.88–5.62)
RBC #/AREA URNS AUTO: ABNORMAL /HPF
RBC #/AREA URNS AUTO: ABNORMAL /HPF
RH BLD: POSITIVE
SAO2 % BLD FROM PO2: 100 % (ref 60–85)
SAO2 % BLD FROM PO2: 78 % (ref 60–85)
SAO2 % BLD FROM PO2: 89 % (ref 60–85)
SARS-COV-2 RNA RESP QL NAA+PROBE: NEGATIVE
SODIUM BLD-SCNC: 141 MMOL/L (ref 136–145)
SODIUM BLD-SCNC: 142 MMOL/L (ref 136–145)
SODIUM BLD-SCNC: 144 MMOL/L (ref 136–145)
SODIUM SERPL-SCNC: 137 MMOL/L (ref 136–145)
SODIUM SERPL-SCNC: 138 MMOL/L (ref 136–145)
SODIUM SERPL-SCNC: 138 MMOL/L (ref 136–145)
SODIUM SERPL-SCNC: 141 MMOL/L (ref 136–145)
SODIUM SERPL-SCNC: 141 MMOL/L (ref 136–145)
SODIUM SERPL-SCNC: 142 MMOL/L (ref 136–145)
SODIUM SERPL-SCNC: 143 MMOL/L (ref 136–145)
SODIUM SERPL-SCNC: 146 MMOL/L (ref 136–145)
SP GR UR STRIP.AUTO: 1.02 (ref 1–1.03)
SP GR UR STRIP.AUTO: >1.045 (ref 1–1.03)
SPECIMEN EXPIRATION DATE: NORMAL
SPECIMEN SOURCE: ABNORMAL
SPECIMEN SOURCE: NORMAL
T WAVE AXIS: 3 DEGREES
T WAVE AXIS: 32 DEGREES
T WAVE AXIS: 71 DEGREES
TROPONIN I SERPL-MCNC: 2.86 NG/ML
TROPONIN I SERPL-MCNC: 6.68 NG/ML
TROPONIN I SERPL-MCNC: 6.82 NG/ML
TROPONIN I SERPL-MCNC: 6.84 NG/ML
TROPONIN I SERPL-MCNC: 6.85 NG/ML
TROPONIN I SERPL-MCNC: <0.02 NG/ML
TSH SERPL DL<=0.05 MIU/L-ACNC: 2.04 UIU/ML (ref 0.36–3.74)
UROBILINOGEN UR QL STRIP.AUTO: 0.2 E.U./DL
UROBILINOGEN UR QL STRIP.AUTO: ABNORMAL E.U./DL
VENT AC: 14
VENT AC: 16
VENT AC: 16
VENT AC: 18
VENT- AC: AC
VENTRICULAR RATE: 129 BPM
VENTRICULAR RATE: 73 BPM
VENTRICULAR RATE: 83 BPM
VT SETTING VENT: 500 ML
VT SETTING VENT: 500 ML
VT SETTING VENT: 600 ML
WBC # BLD AUTO: 12.61 THOUSAND/UL (ref 4.31–10.16)
WBC # BLD AUTO: 16.19 THOUSAND/UL (ref 4.31–10.16)
WBC # BLD AUTO: 17.87 THOUSAND/UL (ref 4.31–10.16)
WBC # BLD AUTO: 20.08 THOUSAND/UL (ref 4.31–10.16)
WBC # BLD AUTO: 21.04 THOUSAND/UL (ref 4.31–10.16)
WBC # BLD AUTO: 21.73 THOUSAND/UL (ref 4.31–10.16)
WBC # BLD AUTO: 24.58 THOUSAND/UL (ref 4.31–10.16)
WBC #/AREA URNS AUTO: ABNORMAL /HPF
WBC #/AREA URNS AUTO: ABNORMAL /HPF

## 2021-01-01 PROCEDURE — 80053 COMPREHEN METABOLIC PANEL: CPT | Performed by: EMERGENCY MEDICINE

## 2021-01-01 PROCEDURE — NC001 PR NO CHARGE: Performed by: SURGERY

## 2021-01-01 PROCEDURE — 86850 RBC ANTIBODY SCREEN: CPT | Performed by: EMERGENCY MEDICINE

## 2021-01-01 PROCEDURE — 0B958ZZ DRAINAGE OF RIGHT MIDDLE LOBE BRONCHUS, VIA NATURAL OR ARTIFICIAL OPENING ENDOSCOPIC: ICD-10-PCS | Performed by: EMERGENCY MEDICINE

## 2021-01-01 PROCEDURE — 97110 THERAPEUTIC EXERCISES: CPT

## 2021-01-01 PROCEDURE — 96375 TX/PRO/DX INJ NEW DRUG ADDON: CPT

## 2021-01-01 PROCEDURE — 82948 REAGENT STRIP/BLOOD GLUCOSE: CPT

## 2021-01-01 PROCEDURE — 81001 URINALYSIS AUTO W/SCOPE: CPT | Performed by: PHYSICIAN ASSISTANT

## 2021-01-01 PROCEDURE — 31500 INSERT EMERGENCY AIRWAY: CPT | Performed by: EMERGENCY MEDICINE

## 2021-01-01 PROCEDURE — 97112 NEUROMUSCULAR REEDUCATION: CPT | Performed by: PHYSICAL THERAPY ASSISTANT

## 2021-01-01 PROCEDURE — 81001 URINALYSIS AUTO W/SCOPE: CPT | Performed by: STUDENT IN AN ORGANIZED HEALTH CARE EDUCATION/TRAINING PROGRAM

## 2021-01-01 PROCEDURE — 97112 NEUROMUSCULAR REEDUCATION: CPT | Performed by: PHYSICAL THERAPIST

## 2021-01-01 PROCEDURE — 84132 ASSAY OF SERUM POTASSIUM: CPT

## 2021-01-01 PROCEDURE — 85027 COMPLETE CBC AUTOMATED: CPT | Performed by: SURGERY

## 2021-01-01 PROCEDURE — 83690 ASSAY OF LIPASE: CPT | Performed by: SURGERY

## 2021-01-01 PROCEDURE — 36556 INSERT NON-TUNNEL CV CATH: CPT | Performed by: SURGERY

## 2021-01-01 PROCEDURE — 94760 N-INVAS EAR/PLS OXIMETRY 1: CPT | Performed by: SOCIAL WORKER

## 2021-01-01 PROCEDURE — 80048 BASIC METABOLIC PNL TOTAL CA: CPT | Performed by: PHYSICIAN ASSISTANT

## 2021-01-01 PROCEDURE — 84100 ASSAY OF PHOSPHORUS: CPT | Performed by: STUDENT IN AN ORGANIZED HEALTH CARE EDUCATION/TRAINING PROGRAM

## 2021-01-01 PROCEDURE — 82150 ASSAY OF AMYLASE: CPT | Performed by: SURGERY

## 2021-01-01 PROCEDURE — 70498 CT ANGIOGRAPHY NECK: CPT

## 2021-01-01 PROCEDURE — 83036 HEMOGLOBIN GLYCOSYLATED A1C: CPT | Performed by: NURSE PRACTITIONER

## 2021-01-01 PROCEDURE — 97140 MANUAL THERAPY 1/> REGIONS: CPT | Performed by: PHYSICAL THERAPIST

## 2021-01-01 PROCEDURE — G0390 TRAUMA RESPONS W/HOSP CRITI: HCPCS

## 2021-01-01 PROCEDURE — 97112 NEUROMUSCULAR REEDUCATION: CPT

## 2021-01-01 PROCEDURE — 71045 X-RAY EXAM CHEST 1 VIEW: CPT

## 2021-01-01 PROCEDURE — 0B9B8ZZ DRAINAGE OF LEFT LOWER LOBE BRONCHUS, VIA NATURAL OR ARTIFICIAL OPENING ENDOSCOPIC: ICD-10-PCS | Performed by: EMERGENCY MEDICINE

## 2021-01-01 PROCEDURE — 82803 BLOOD GASES ANY COMBINATION: CPT

## 2021-01-01 PROCEDURE — 86900 BLOOD TYPING SEROLOGIC ABO: CPT | Performed by: EMERGENCY MEDICINE

## 2021-01-01 PROCEDURE — 83615 LACTATE (LD) (LDH) ENZYME: CPT | Performed by: SURGERY

## 2021-01-01 PROCEDURE — 70496 CT ANGIOGRAPHY HEAD: CPT

## 2021-01-01 PROCEDURE — 94003 VENT MGMT INPAT SUBQ DAY: CPT

## 2021-01-01 PROCEDURE — 99204 OFFICE O/P NEW MOD 45 MIN: CPT | Performed by: NURSE PRACTITIONER

## 2021-01-01 PROCEDURE — 82947 ASSAY GLUCOSE BLOOD QUANT: CPT

## 2021-01-01 PROCEDURE — G1004 CDSM NDSC: HCPCS

## 2021-01-01 PROCEDURE — 97140 MANUAL THERAPY 1/> REGIONS: CPT

## 2021-01-01 PROCEDURE — 96365 THER/PROPH/DIAG IV INF INIT: CPT

## 2021-01-01 PROCEDURE — 84295 ASSAY OF SERUM SODIUM: CPT

## 2021-01-01 PROCEDURE — 85014 HEMATOCRIT: CPT

## 2021-01-01 PROCEDURE — 85730 THROMBOPLASTIN TIME PARTIAL: CPT | Performed by: STUDENT IN AN ORGANIZED HEALTH CARE EDUCATION/TRAINING PROGRAM

## 2021-01-01 PROCEDURE — 85027 COMPLETE CBC AUTOMATED: CPT | Performed by: PHYSICIAN ASSISTANT

## 2021-01-01 PROCEDURE — 80048 BASIC METABOLIC PNL TOTAL CA: CPT | Performed by: STUDENT IN AN ORGANIZED HEALTH CARE EDUCATION/TRAINING PROGRAM

## 2021-01-01 PROCEDURE — 99238 HOSP IP/OBS DSCHRG MGMT 30/<: CPT | Performed by: EMERGENCY MEDICINE

## 2021-01-01 PROCEDURE — 97140 MANUAL THERAPY 1/> REGIONS: CPT | Performed by: PHYSICAL THERAPY ASSISTANT

## 2021-01-01 PROCEDURE — 99213 OFFICE O/P EST LOW 20 MIN: CPT | Performed by: PHYSICIAN ASSISTANT

## 2021-01-01 PROCEDURE — 99285 EMERGENCY DEPT VISIT HI MDM: CPT

## 2021-01-01 PROCEDURE — 82805 BLOOD GASES W/O2 SATURATION: CPT | Performed by: PHYSICIAN ASSISTANT

## 2021-01-01 PROCEDURE — 93010 ELECTROCARDIOGRAM REPORT: CPT | Performed by: INTERNAL MEDICINE

## 2021-01-01 PROCEDURE — 4A133B1 MONITORING OF ARTERIAL PRESSURE, PERIPHERAL, PERCUTANEOUS APPROACH: ICD-10-PCS | Performed by: SURGERY

## 2021-01-01 PROCEDURE — U0003 INFECTIOUS AGENT DETECTION BY NUCLEIC ACID (DNA OR RNA); SEVERE ACUTE RESPIRATORY SYNDROME CORONAVIRUS 2 (SARS-COV-2) (CORONAVIRUS DISEASE [COVID-19]), AMPLIFIED PROBE TECHNIQUE, MAKING USE OF HIGH THROUGHPUT TECHNOLOGIES AS DESCRIBED BY CMS-2020-01-R: HCPCS | Performed by: PHYSICIAN ASSISTANT

## 2021-01-01 PROCEDURE — 99205 OFFICE O/P NEW HI 60 MIN: CPT | Performed by: NEUROLOGICAL SURGERY

## 2021-01-01 PROCEDURE — 85730 THROMBOPLASTIN TIME PARTIAL: CPT | Performed by: PHYSICIAN ASSISTANT

## 2021-01-01 PROCEDURE — 82977 ASSAY OF GGT: CPT | Performed by: SURGERY

## 2021-01-01 PROCEDURE — NC001 PR NO CHARGE: Performed by: NEUROLOGICAL SURGERY

## 2021-01-01 PROCEDURE — 94760 N-INVAS EAR/PLS OXIMETRY 1: CPT

## 2021-01-01 PROCEDURE — 99291 CRITICAL CARE FIRST HOUR: CPT | Performed by: SURGERY

## 2021-01-01 PROCEDURE — 02HV33Z INSERTION OF INFUSION DEVICE INTO SUPERIOR VENA CAVA, PERCUTANEOUS APPROACH: ICD-10-PCS | Performed by: SURGERY

## 2021-01-01 PROCEDURE — 93005 ELECTROCARDIOGRAM TRACING: CPT

## 2021-01-01 PROCEDURE — 0B968ZZ DRAINAGE OF RIGHT LOWER LOBE BRONCHUS, VIA NATURAL OR ARTIFICIAL OPENING ENDOSCOPIC: ICD-10-PCS | Performed by: EMERGENCY MEDICINE

## 2021-01-01 PROCEDURE — 81001 URINALYSIS AUTO W/SCOPE: CPT | Performed by: SURGERY

## 2021-01-01 PROCEDURE — 82553 CREATINE MB FRACTION: CPT | Performed by: SURGERY

## 2021-01-01 PROCEDURE — 85027 COMPLETE CBC AUTOMATED: CPT | Performed by: STUDENT IN AN ORGANIZED HEALTH CARE EDUCATION/TRAINING PROGRAM

## 2021-01-01 PROCEDURE — NC001 PR NO CHARGE: Performed by: EMERGENCY MEDICINE

## 2021-01-01 PROCEDURE — 99223 1ST HOSP IP/OBS HIGH 75: CPT | Performed by: NEUROLOGICAL SURGERY

## 2021-01-01 PROCEDURE — 80076 HEPATIC FUNCTION PANEL: CPT | Performed by: STUDENT IN AN ORGANIZED HEALTH CARE EDUCATION/TRAINING PROGRAM

## 2021-01-01 PROCEDURE — 71250 CT THORAX DX C-: CPT

## 2021-01-01 PROCEDURE — 85610 PROTHROMBIN TIME: CPT | Performed by: PHYSICIAN ASSISTANT

## 2021-01-01 PROCEDURE — U0005 INFEC AGEN DETEC AMPLI PROBE: HCPCS | Performed by: PHYSICIAN ASSISTANT

## 2021-01-01 PROCEDURE — 99291 CRITICAL CARE FIRST HOUR: CPT | Performed by: EMERGENCY MEDICINE

## 2021-01-01 PROCEDURE — 82805 BLOOD GASES W/O2 SATURATION: CPT | Performed by: NURSE PRACTITIONER

## 2021-01-01 PROCEDURE — 85025 COMPLETE CBC W/AUTO DIFF WBC: CPT | Performed by: EMERGENCY MEDICINE

## 2021-01-01 PROCEDURE — 85610 PROTHROMBIN TIME: CPT | Performed by: STUDENT IN AN ORGANIZED HEALTH CARE EDUCATION/TRAINING PROGRAM

## 2021-01-01 PROCEDURE — 96360 HYDRATION IV INFUSION INIT: CPT

## 2021-01-01 PROCEDURE — 82805 BLOOD GASES W/O2 SATURATION: CPT | Performed by: SURGERY

## 2021-01-01 PROCEDURE — 87186 SC STD MICRODIL/AGAR DIL: CPT | Performed by: NURSE PRACTITIONER

## 2021-01-01 PROCEDURE — 96374 THER/PROPH/DIAG INJ IV PUSH: CPT

## 2021-01-01 PROCEDURE — 85025 COMPLETE CBC W/AUTO DIFF WBC: CPT | Performed by: SURGERY

## 2021-01-01 PROCEDURE — 83735 ASSAY OF MAGNESIUM: CPT | Performed by: STUDENT IN AN ORGANIZED HEALTH CARE EDUCATION/TRAINING PROGRAM

## 2021-01-01 PROCEDURE — 84443 ASSAY THYROID STIM HORMONE: CPT | Performed by: SURGERY

## 2021-01-01 PROCEDURE — 80053 COMPREHEN METABOLIC PANEL: CPT | Performed by: NURSE PRACTITIONER

## 2021-01-01 PROCEDURE — 4A133J1 MONITORING OF ARTERIAL PULSE, PERIPHERAL, PERCUTANEOUS APPROACH: ICD-10-PCS | Performed by: SURGERY

## 2021-01-01 PROCEDURE — 97110 THERAPEUTIC EXERCISES: CPT | Performed by: PHYSICAL THERAPIST

## 2021-01-01 PROCEDURE — 85610 PROTHROMBIN TIME: CPT | Performed by: EMERGENCY MEDICINE

## 2021-01-01 PROCEDURE — 86901 BLOOD TYPING SEROLOGIC RH(D): CPT | Performed by: EMERGENCY MEDICINE

## 2021-01-01 PROCEDURE — 84484 ASSAY OF TROPONIN QUANT: CPT | Performed by: PHYSICIAN ASSISTANT

## 2021-01-01 PROCEDURE — 82330 ASSAY OF CALCIUM: CPT | Performed by: STUDENT IN AN ORGANIZED HEALTH CARE EDUCATION/TRAINING PROGRAM

## 2021-01-01 PROCEDURE — 5A1935Z RESPIRATORY VENTILATION, LESS THAN 24 CONSECUTIVE HOURS: ICD-10-PCS | Performed by: SURGERY

## 2021-01-01 PROCEDURE — 94002 VENT MGMT INPAT INIT DAY: CPT | Performed by: SOCIAL WORKER

## 2021-01-01 PROCEDURE — 87070 CULTURE OTHR SPECIMN AEROBIC: CPT | Performed by: NURSE PRACTITIONER

## 2021-01-01 PROCEDURE — 36415 COLL VENOUS BLD VENIPUNCTURE: CPT | Performed by: PHYSICIAN ASSISTANT

## 2021-01-01 PROCEDURE — 36620 INSERTION CATHETER ARTERY: CPT | Performed by: EMERGENCY MEDICINE

## 2021-01-01 PROCEDURE — 03HY32Z INSERTION OF MONITORING DEVICE INTO UPPER ARTERY, PERCUTANEOUS APPROACH: ICD-10-PCS | Performed by: EMERGENCY MEDICINE

## 2021-01-01 PROCEDURE — 85007 BL SMEAR W/DIFF WBC COUNT: CPT | Performed by: SURGERY

## 2021-01-01 PROCEDURE — 99291 CRITICAL CARE FIRST HOUR: CPT

## 2021-01-01 PROCEDURE — 82550 ASSAY OF CK (CPK): CPT | Performed by: SURGERY

## 2021-01-01 PROCEDURE — 82248 BILIRUBIN DIRECT: CPT | Performed by: STUDENT IN AN ORGANIZED HEALTH CARE EDUCATION/TRAINING PROGRAM

## 2021-01-01 PROCEDURE — 82330 ASSAY OF CALCIUM: CPT

## 2021-01-01 PROCEDURE — 85730 THROMBOPLASTIN TIME PARTIAL: CPT | Performed by: EMERGENCY MEDICINE

## 2021-01-01 PROCEDURE — 97110 THERAPEUTIC EXERCISES: CPT | Performed by: PHYSICAL THERAPY ASSISTANT

## 2021-01-01 PROCEDURE — 84484 ASSAY OF TROPONIN QUANT: CPT | Performed by: SURGERY

## 2021-01-01 PROCEDURE — 87147 CULTURE TYPE IMMUNOLOGIC: CPT | Performed by: NURSE PRACTITIONER

## 2021-01-01 RX ORDER — ISOSORBIDE MONONITRATE 30 MG/1
TABLET, EXTENDED RELEASE ORAL
COMMUNITY
Start: 2020-01-01

## 2021-01-01 RX ORDER — ACETAMINOPHEN 650 MG/1
650 SUPPOSITORY RECTAL EVERY 4 HOURS PRN
Status: DISCONTINUED | OUTPATIENT
Start: 2021-01-01 | End: 2021-05-23 | Stop reason: HOSPADM

## 2021-01-01 RX ORDER — SUCCINYLCHOLINE/SOD CL,ISO/PF 100 MG/5ML
120 SYRINGE (ML) INTRAVENOUS ONCE
Status: COMPLETED | OUTPATIENT
Start: 2021-01-01 | End: 2021-01-01

## 2021-01-01 RX ORDER — METOPROLOL TARTRATE 5 MG/5ML
5 INJECTION INTRAVENOUS
Status: COMPLETED | OUTPATIENT
Start: 2021-01-01 | End: 2021-01-01

## 2021-01-01 RX ORDER — HYDROCHLOROTHIAZIDE 12.5 MG/1
12.5 CAPSULE, GELATIN COATED ORAL DAILY
COMMUNITY
End: 2021-05-23 | Stop reason: HOSPADM

## 2021-01-01 RX ORDER — METHYLPREDNISOLONE 4 MG/1
TABLET ORAL
Qty: 21 EACH | Refills: 0 | Status: SHIPPED | OUTPATIENT
Start: 2021-01-01 | End: 2021-01-01

## 2021-01-01 RX ORDER — POTASSIUM CHLORIDE 29.8 MG/ML
40 INJECTION INTRAVENOUS ONCE
Status: COMPLETED | OUTPATIENT
Start: 2021-01-01 | End: 2021-01-01

## 2021-01-01 RX ORDER — SODIUM CHLORIDE, SODIUM GLUCONATE, SODIUM ACETATE, POTASSIUM CHLORIDE, MAGNESIUM CHLORIDE, SODIUM PHOSPHATE, DIBASIC, AND POTASSIUM PHOSPHATE .53; .5; .37; .037; .03; .012; .00082 G/100ML; G/100ML; G/100ML; G/100ML; G/100ML; G/100ML; G/100ML
1000 INJECTION, SOLUTION INTRAVENOUS ONCE
Status: COMPLETED | OUTPATIENT
Start: 2021-01-01 | End: 2021-01-01

## 2021-01-01 RX ORDER — CEFAZOLIN SODIUM 1 G/50ML
1000 SOLUTION INTRAVENOUS EVERY 8 HOURS
Status: DISCONTINUED | OUTPATIENT
Start: 2021-01-01 | End: 2021-01-01

## 2021-01-01 RX ORDER — METOPROLOL SUCCINATE 25 MG/1
25 TABLET, EXTENDED RELEASE ORAL DAILY
COMMUNITY
End: 2021-05-23 | Stop reason: HOSPADM

## 2021-01-01 RX ORDER — FENTANYL CITRATE-0.9 % NACL/PF 10 MCG/ML
50 PLASTIC BAG, INJECTION (ML) INTRAVENOUS CONTINUOUS
Status: DISCONTINUED | OUTPATIENT
Start: 2021-01-01 | End: 2021-01-01 | Stop reason: HOSPADM

## 2021-01-01 RX ORDER — PROPOFOL 10 MG/ML
INJECTION, EMULSION INTRAVENOUS
Status: COMPLETED | OUTPATIENT
Start: 2021-01-01 | End: 2021-01-01

## 2021-01-01 RX ORDER — LEVOTHYROXINE SODIUM ANHYDROUS 100 UG/5ML
20 INJECTION, POWDER, LYOPHILIZED, FOR SOLUTION INTRAVENOUS ONCE
Status: COMPLETED | OUTPATIENT
Start: 2021-01-01 | End: 2021-01-01

## 2021-01-01 RX ORDER — SODIUM CHLORIDE, SODIUM GLUCONATE, SODIUM ACETATE, POTASSIUM CHLORIDE, MAGNESIUM CHLORIDE, SODIUM PHOSPHATE, DIBASIC, AND POTASSIUM PHOSPHATE .53; .5; .37; .037; .03; .012; .00082 G/100ML; G/100ML; G/100ML; G/100ML; G/100ML; G/100ML; G/100ML
75 INJECTION, SOLUTION INTRAVENOUS CONTINUOUS
Status: DISCONTINUED | OUTPATIENT
Start: 2021-01-01 | End: 2021-01-01

## 2021-01-01 RX ORDER — ISOSORBIDE MONONITRATE 30 MG/1
30 TABLET, EXTENDED RELEASE ORAL DAILY
COMMUNITY
End: 2021-05-23 | Stop reason: HOSPADM

## 2021-01-01 RX ORDER — PROPOFOL 10 MG/ML
5-50 INJECTION, EMULSION INTRAVENOUS
Status: DISCONTINUED | OUTPATIENT
Start: 2021-01-01 | End: 2021-01-01 | Stop reason: HOSPADM

## 2021-01-01 RX ORDER — HYDRALAZINE HYDROCHLORIDE 20 MG/ML
10 INJECTION INTRAMUSCULAR; INTRAVENOUS ONCE
Status: COMPLETED | OUTPATIENT
Start: 2021-01-01 | End: 2021-01-01

## 2021-01-01 RX ORDER — CLOPIDOGREL BISULFATE 75 MG/1
75 TABLET ORAL DAILY
COMMUNITY
End: 2021-05-23 | Stop reason: HOSPADM

## 2021-01-01 RX ORDER — FENTANYL CITRATE 50 UG/ML
INJECTION, SOLUTION INTRAMUSCULAR; INTRAVENOUS CODE/TRAUMA/SEDATION MEDICATION
Status: COMPLETED | OUTPATIENT
Start: 2021-01-01 | End: 2021-01-01

## 2021-01-01 RX ORDER — FAMOTIDINE 40 MG/5ML
20 POWDER, FOR SUSPENSION ORAL EVERY 12 HOURS SCHEDULED
Status: DISCONTINUED | OUTPATIENT
Start: 2021-01-01 | End: 2021-05-23 | Stop reason: HOSPADM

## 2021-01-01 RX ORDER — HYDRALAZINE HYDROCHLORIDE 20 MG/ML
10 INJECTION INTRAMUSCULAR; INTRAVENOUS EVERY 4 HOURS PRN
Status: DISCONTINUED | OUTPATIENT
Start: 2021-01-01 | End: 2021-05-23 | Stop reason: HOSPADM

## 2021-01-01 RX ORDER — MANNITOL 20 G/100ML
25 INJECTION, SOLUTION INTRAVENOUS ONCE
Status: COMPLETED | OUTPATIENT
Start: 2021-01-01 | End: 2021-01-01

## 2021-01-01 RX ORDER — ATORVASTATIN CALCIUM 80 MG/1
80 TABLET, FILM COATED ORAL DAILY
COMMUNITY
End: 2021-05-23 | Stop reason: HOSPADM

## 2021-01-01 RX ORDER — ATROPINE SULFATE 0.1 MG/ML
1 SYRINGE (ML) INJECTION ONCE
Status: COMPLETED | OUTPATIENT
Start: 2021-01-01 | End: 2021-01-01

## 2021-01-01 RX ORDER — SODIUM CHLORIDE 450 MG/100ML
100 INJECTION, SOLUTION INTRAVENOUS CONTINUOUS
Status: DISCONTINUED | OUTPATIENT
Start: 2021-01-01 | End: 2021-05-23 | Stop reason: HOSPADM

## 2021-01-01 RX ORDER — DONEPEZIL HYDROCHLORIDE 10 MG/1
10 TABLET, FILM COATED ORAL
COMMUNITY
End: 2021-05-23 | Stop reason: HOSPADM

## 2021-01-01 RX ORDER — PANTOPRAZOLE SODIUM 20 MG/1
20 TABLET, DELAYED RELEASE ORAL DAILY
COMMUNITY
End: 2021-05-23 | Stop reason: HOSPADM

## 2021-01-01 RX ORDER — ETOMIDATE 2 MG/ML
30 INJECTION INTRAVENOUS ONCE
Status: COMPLETED | OUTPATIENT
Start: 2021-01-01 | End: 2021-01-01

## 2021-01-01 RX ADMIN — SODIUM CHLORIDE 1000 MG: 0.9 INJECTION, SOLUTION INTRAVENOUS at 18:04

## 2021-01-01 RX ADMIN — METOROPROLOL TARTRATE 5 MG: 5 INJECTION, SOLUTION INTRAVENOUS at 18:01

## 2021-01-01 RX ADMIN — SODIUM CHLORIDE 100 ML/HR: 0.45 INJECTION, SOLUTION INTRAVENOUS at 14:16

## 2021-01-01 RX ADMIN — SODIUM CHLORIDE 500 ML: 0.9 INJECTION, SOLUTION INTRAVENOUS at 12:32

## 2021-01-01 RX ADMIN — LEVOTHYROXINE SODIUM ANHYDROUS 40 MCG/HR: 100 INJECTION, POWDER, LYOPHILIZED, FOR SOLUTION INTRAVENOUS at 10:48

## 2021-01-01 RX ADMIN — SODIUM CHLORIDE, SODIUM GLUCONATE, SODIUM ACETATE, POTASSIUM CHLORIDE, MAGNESIUM CHLORIDE, SODIUM PHOSPHATE, DIBASIC, AND POTASSIUM PHOSPHATE 1000 ML: .53; .5; .37; .037; .03; .012; .00082 INJECTION, SOLUTION INTRAVENOUS at 00:30

## 2021-01-01 RX ADMIN — LEVOTHYROXINE SODIUM ANHYDROUS 20 MCG/HR: 100 INJECTION, POWDER, LYOPHILIZED, FOR SOLUTION INTRAVENOUS at 03:10

## 2021-01-01 RX ADMIN — ACETAMINOPHEN 650 MG: 650 SUPPOSITORY RECTAL at 19:59

## 2021-01-01 RX ADMIN — CEFAZOLIN SODIUM 1000 MG: 1 SOLUTION INTRAVENOUS at 08:17

## 2021-01-01 RX ADMIN — LEVOTHYROXINE SODIUM ANHYDROUS 40 MCG/HR: 100 INJECTION, POWDER, LYOPHILIZED, FOR SOLUTION INTRAVENOUS at 15:54

## 2021-01-01 RX ADMIN — SODIUM CHLORIDE, SODIUM GLUCONATE, SODIUM ACETATE, POTASSIUM CHLORIDE, MAGNESIUM CHLORIDE, SODIUM PHOSPHATE, DIBASIC, AND POTASSIUM PHOSPHATE 75 ML/HR: .53; .5; .37; .037; .03; .012; .00082 INJECTION, SOLUTION INTRAVENOUS at 15:51

## 2021-01-01 RX ADMIN — VASOPRESSIN 0.04 UNITS/MIN: 20 INJECTION INTRAVENOUS at 12:25

## 2021-01-01 RX ADMIN — SODIUM CHLORIDE 100 ML/HR: 0.45 INJECTION, SOLUTION INTRAVENOUS at 08:03

## 2021-01-01 RX ADMIN — SODIUM CHLORIDE 1000 MG: 0.9 INJECTION, SOLUTION INTRAVENOUS at 11:55

## 2021-01-01 RX ADMIN — CEFTAZIDIME 1000 MG: 1 INJECTION, POWDER, FOR SOLUTION INTRAMUSCULAR; INTRAVENOUS at 22:41

## 2021-01-01 RX ADMIN — VASOPRESSIN 0.04 UNITS/MIN: 20 INJECTION INTRAVENOUS at 02:35

## 2021-01-01 RX ADMIN — CEFTAZIDIME 1000 MG: 1 INJECTION, POWDER, FOR SOLUTION INTRAMUSCULAR; INTRAVENOUS at 11:55

## 2021-01-01 RX ADMIN — HYDRALAZINE HYDROCHLORIDE 10 MG: 20 INJECTION INTRAMUSCULAR; INTRAVENOUS at 11:23

## 2021-01-01 RX ADMIN — FAMOTIDINE 20 MG: 40 POWDER, FOR SUSPENSION ORAL at 20:49

## 2021-01-01 RX ADMIN — ETOMIDATE 30 MG: 20 INJECTION, SOLUTION INTRAVENOUS at 10:41

## 2021-01-01 RX ADMIN — LEVOTHYROXINE SODIUM ANHYDROUS 20 MCG: 100 INJECTION, POWDER, LYOPHILIZED, FOR SOLUTION INTRAVENOUS at 03:09

## 2021-01-01 RX ADMIN — FAMOTIDINE 20 MG: 40 POWDER, FOR SUSPENSION ORAL at 21:22

## 2021-01-01 RX ADMIN — METOROPROLOL TARTRATE 5 MG: 5 INJECTION, SOLUTION INTRAVENOUS at 18:49

## 2021-01-01 RX ADMIN — PHENYLEPHRINE HYDROCHLORIDE 50 MCG/MIN: 10 INJECTION INTRAVENOUS at 01:03

## 2021-01-01 RX ADMIN — POTASSIUM CHLORIDE 40 MEQ: 29.8 INJECTION, SOLUTION INTRAVENOUS at 12:04

## 2021-01-01 RX ADMIN — PROPOFOL 5 MCG/KG/MIN: 10 INJECTION, EMULSION INTRAVENOUS at 11:10

## 2021-01-01 RX ADMIN — LEVETIRACETAM 1000 MG: 100 INJECTION, SOLUTION INTRAVENOUS at 12:40

## 2021-01-01 RX ADMIN — PROPOFOL 5 MCG/KG/MIN: 10 INJECTION, EMULSION INTRAVENOUS at 12:27

## 2021-01-01 RX ADMIN — NOREPINEPHRINE BITARTRATE 7 MCG/MIN: 1 INJECTION, SOLUTION, CONCENTRATE INTRAVENOUS at 01:52

## 2021-01-01 RX ADMIN — FENTANYL CITRATE 100 MCG: 50 INJECTION INTRAMUSCULAR; INTRAVENOUS at 12:30

## 2021-01-01 RX ADMIN — MANNITOL 25 G: 20 INJECTION, SOLUTION INTRAVENOUS at 11:15

## 2021-01-01 RX ADMIN — HYDRALAZINE HYDROCHLORIDE 10 MG: 20 INJECTION, SOLUTION INTRAMUSCULAR; INTRAVENOUS at 19:45

## 2021-01-01 RX ADMIN — SODIUM CHLORIDE 1000 ML: 0.9 INJECTION, SOLUTION INTRAVENOUS at 11:05

## 2021-01-01 RX ADMIN — ACETAMINOPHEN 650 MG: 650 SUPPOSITORY RECTAL at 20:31

## 2021-01-01 RX ADMIN — METOROPROLOL TARTRATE 5 MG: 5 INJECTION, SOLUTION INTRAVENOUS at 19:12

## 2021-01-01 RX ADMIN — HYDRALAZINE HYDROCHLORIDE 10 MG: 20 INJECTION, SOLUTION INTRAMUSCULAR; INTRAVENOUS at 17:03

## 2021-01-01 RX ADMIN — NICARDIPINE HYDROCHLORIDE 5 MG/HR: 2.5 INJECTION, SOLUTION INTRAVENOUS at 19:46

## 2021-01-01 RX ADMIN — IOHEXOL 100 ML: 350 INJECTION, SOLUTION INTRAVENOUS at 12:48

## 2021-01-01 RX ADMIN — SODIUM CHLORIDE, SODIUM GLUCONATE, SODIUM ACETATE, POTASSIUM CHLORIDE, MAGNESIUM CHLORIDE, SODIUM PHOSPHATE, DIBASIC, AND POTASSIUM PHOSPHATE 1000 ML: .53; .5; .37; .037; .03; .012; .00082 INJECTION, SOLUTION INTRAVENOUS at 01:00

## 2021-01-01 RX ADMIN — FAMOTIDINE 20 MG: 40 POWDER, FOR SUSPENSION ORAL at 08:21

## 2021-01-01 RX ADMIN — HYDRALAZINE HYDROCHLORIDE 10 MG: 20 INJECTION, SOLUTION INTRAMUSCULAR; INTRAVENOUS at 22:03

## 2021-01-01 RX ADMIN — HYDRALAZINE HYDROCHLORIDE 10 MG: 20 INJECTION, SOLUTION INTRAMUSCULAR; INTRAVENOUS at 22:45

## 2021-01-01 RX ADMIN — Medication 120 MG: at 11:16

## 2021-01-05 NOTE — PROGRESS NOTES
Daily Note     Today's date: 2021  Patient name: Marta Armenta  : 1955  MRN: [id number]  Referring provider: Alaina Bloch, MD  Dx:   Encounter Diagnosis     ICD-10-CM    1  Cervical radiculopathy  M54 12    2  Post-operative pain  G89 18    3  Postoperative examination  Z09    4  Posture abnormality  R29 3                   Subjective: pt notes that he did well with the exercises  Objective: See treatment diary below      Assessment: pt tolerated postural exercises well  Will add them in to Hep NV if pt doesn't flare up from postural exercises  Plan: Continue per plan of care        Precautions: DOS 2020      Manuals            B scalene, UT, and Lev scap stm  DB           UPA, and PA t/s mobs T3-T7  Grade 3 DB           T/s rot and ext MWM  DB                        Neuro Re-Ed             Postural reeducation:             rows  blk 2x10           ext  Blue 2x10           B ER  Green 2x10           PNF  Green 2x10           Chin tucks  seated 5''x10           Seated t/s ext  2x10           Seated t/s rot  2x10           Ther Ex                                                                                                                     Ther Activity                                       Gait Training                                       Modalities                          HEP DB

## 2021-01-07 NOTE — PROGRESS NOTES
Daily Note     Today's date: 2021  Patient name: Mini Quiroz  : 1955  MRN: [id number]  Referring provider: Devon Mcclain MD  Dx:   Encounter Diagnosis     ICD-10-CM    1  Cervical radiculopathy  M54 12    2  Post-operative pain  G89 18    3  Postoperative examination  Z09    4  Posture abnormality  R29 3                   Subjective: Pt notes that he did alright after his last visit to PT  Objective: See treatment diary below      Assessment: pt showing good tolerance to activities  Will add band exercises in nv for HEP  Plan: Continue per plan of care        Precautions: DOS 2020      Manuals           B scalene, UT, and Lev scap stm  DB DB          UPA, and PA t/s mobs T3-T7  Grade 3 DB DB          T/s rot and ext MWM  DB DB                       Neuro Re-Ed             Postural reeducation:             rows  blk 2x10 Silver 3''x3x10          ext  Blue 2x10 blk 2x10          B ER  Green 2x10 Blue 3x10          PNF  Green 2x10 Blue 2x10          Chin tucks  seated 5''x10 seated 5''x2x10          Seated t/s ext  2x10 2x10          Seated t/s rot  2x10 2x10          Ther Ex             UBE   3'x3'                                                                                                     Ther Activity                                       Gait Training                                       Modalities                          HEP DB

## 2021-01-11 NOTE — PROGRESS NOTES
Daily Note     Today's date: 2021  Patient name: Mekhi Lawson  : 1955  MRN: [id number]  Referring provider: Franklyn Neff MD  Dx:   Encounter Diagnosis     ICD-10-CM    1  Cervical radiculopathy  M54 12    2  Post-operative pain  G89 18    3  Posture abnormality  R29 3                   Subjective: pt reports that he has new sxs of elbow pain after sitting on bed, no specific event happened with elbow  Objective: See treatment diary below      Assessment:    elbow ext extension at beginning of treatment -30* after sliders 20* gained 10* but still tightness at end range  Modified treatment session  2* to sxs in elbow, instructed patient to perform active elbow flex ext to point of feeling sxs, not pushing through sxs or holding motion  Pt verbalizes understanding  Plan: Progress treatment as tolerated         Precautions: DOS 2020      Manuals          B scalene, UT, and Lev scap stm  DB DB DL         UPA, and PA t/s mobs T3-T7  Grade 3 DB DB DB         T/s rot and ext MWM  DB DB DB         Radial and median neve sliders    DL         Neuro Re-Ed             Postural reeducation:             rows  blk 2x10 Silver 3''x3x10 nv         ext  Blue 2x10 blk 2x10 nv         B ER  Green 2x10 Blue 3x10 nv         PNF  Green 2x10 Blue 2x10 nv         Chin tucks  seated 5''x10 seated 5''x2x10 seated 5"  2x10         Seated t/s ext  2x10 2x10 2x10         Seated t/s rot  2x10 2x10 2x10         Ther Ex             UBE   3'x3' np                                                                                                    Ther Activity                                       Gait Training                                       Modalities                          HEP DB

## 2021-01-14 NOTE — PROGRESS NOTES
Daily Note     Today's date: 2021  Patient name: Albino Meckel  : 1955  MRN: [id number]  Referring provider: Mauricio Bloom MD  Dx:   Encounter Diagnosis     ICD-10-CM    1  Cervical radiculopathy  M54 12    2  Post-operative pain  G89 18    3  Posture abnormality  R29 3    4  Postoperative examination  Z09                   Subjective: pt notes that he has less sxs in elbow with increased motion but still not full      Objective: See treatment diary below      Assessment: Tolerated treatment with ability to resume all exercises with no increased sxs in elbow  Patient again was able to achieve increased extension of elbow after sliders  Plan: Continue per plan of care        Precautions: DOS 2020      Manuals         B scalene, UT, and Lev scap stm  DB DB DL DL        UPA, and PA t/s mobs T3-T7  Grade 3 DB DB DB DB        T/s rot and ext MWM  DB DB DB DB        Radial and median neve sliders    DL DL        Neuro Re-Ed             Postural reeducation:             rows  blk 2x10 Silver 3''x3x10 nv blk 3x10        ext  Blue 2x10 blk 2x10 nv blk 3x10        B ER  Green 2x10 Blue 3x10 nv Blue 3x10        PNF  Green 2x10 Blue 2x10 nv Blue 3x10        Chin tucks  seated 5''x10 seated 5''x2x10 seated 5"  2x10 seated 5"2x10        Seated t/s ext  2x10 2x10 2x10 2x10        Seated t/s rot  2x10 2x10 2x10 2x10        Ther Ex             UBE   3'x3' np                                                                                                    Ther Activity                                       Gait Training                                       Modalities                          HEP DB

## 2021-01-18 NOTE — PROGRESS NOTES
Daily Note     Today's date: 2021  Patient name: Dony Fung  : 1955  MRN: [id number]  Referring provider: Olaf Mao MD  Dx:   Encounter Diagnosis     ICD-10-CM    1  Cervical radiculopathy  M54 12    2  Post-operative pain  G89 18    3  Posture abnormality  R29 3    4  Postoperative examination  Z09                   Subjective: pt notes that elbow has some stiffness but not the pain  Objective: See treatment diary below      Assessment: Tolerated treatment with resuming UBE with only some tightness no reproduction of sxs in elbow    Patient with tightness in UT  Plan: Continue per plan of care        Precautions: DOS 2020      Manuals        B scalene, UT, and Lev scap stm  DB DB DL DL DL       UPA, and PA t/s mobs T3-T7  Grade 3 DB DB DB DB nv       T/s rot and ext MWM  DB DB DB DB nv       Radial and median neve sliders    DL DL No needed       Neuro Re-Ed             Postural reeducation:             rows  blk 2x10 Silver 3''x3x10 nv blk 3x10 Silver 3x10       ext  Blue 2x10 blk 2x10 nv blk 3x10 blk 3x10       B ER  Green 2x10 Blue 3x10 nv Blue 3x10 blk 2x10       PNF  Green 2x10 Blue 2x10 nv Blue 3x10 blk  2x10-       Chin tucks  seated 5''x10 seated 5''x2x10 seated 5"  2x10 seated 5"2x10 seated  5"x20       Seated t/s ext  2x10 2x10 2x10 2x10 2x10       Seated t/s rot  2x10 2x10 2x10 2x10 2x10       Ther Ex             UBE   3'x3' np  2'x2'                                                                                                  Ther Activity                                       Gait Training                                       Modalities                          HEP DB

## 2021-01-21 NOTE — PROGRESS NOTES
Daily Note     Today's date: 2021  Patient name: Laura Coats  : 1955  MRN: [id number]  Referring provider: Gayle Hart MD  Dx:   Encounter Diagnosis     ICD-10-CM    1  Cervical radiculopathy  M54 12    2  Post-operative pain  G89 18    3  Posture abnormality  R29 3    4  Postoperative examination  Z09                   Subjective: pt notes that he is having overall less sxs in neck and only some minimal stretching in elbow, which he states could have been there since TSR  Objective: See treatment diary below      Assessment: Tolerated treatment with good form on exercises  Less tension noted in neck with manuals   Patient demonstrated fatigue post treatment, exhibited good technique with therapeutic exercises and would benefit from continued PT updated HEP  Plan: Continue per plan of care        Precautions: DOS 2020      Manuals       B scalene, UT, and Lev scap stm  DB DB DL DL DL       UPA, and PA t/s mobs T3-T7  Grade 3 DB DB DB DB nv       T/s rot and ext MWM  DB DB DB DB nv       Radial and median neve sliders    DL DL No needed       Neuro Re-Ed             Postural reeducation:             rows  blk 2x10 Silver 3''x3x10 nv blk 3x10 Silver 3x10 Silver 3x10      ext  Blue 2x10 blk 2x10 nv blk 3x10 blk 3x10 Silver 3x10      B ER  Green 2x10 Blue 3x10 nv Blue 3x10 blk 2x10 Blue 3x10      PNF  Green 2x10 Blue 2x10 nv Blue 3x10 blk  2x10- blk  2x10      Chin tucks  seated 5''x10 seated 5''x2x10 seated 5"  2x10 seated 5"2x10 seated  5"x20 seated 5"x20      Seated t/s ext  2x10 2x10 2x10 2x10 2x10 2x10      Seated t/s rot  2x10 2x10 2x10 2x10 2x10 2x10      Ther Ex             UBE   3'x3' np  2'x2' 3'x3'                                                                                                 Ther Activity                                       Gait Training                                       Modalities                          HEP DB

## 2021-01-25 NOTE — PROGRESS NOTES
Daily Note     Today's date: 2021  Patient name: Trisha Dickerson  : 1955  MRN: [id number]  Referring provider: Paulina Hernandez MD  Dx:   Encounter Diagnosis     ICD-10-CM    1  Cervical radiculopathy  M54 12    2  Post-operative pain  G89 18    3  Posture abnormality  R29 3    4  Postoperative examination  Z09                   Subjective: pt notes that the c/s feels tight this am, but not bad overall  He notes that he continues to not sleep well, some b/c of the neck, some b/c of the consistent dreams that he has  Objective: See treatment diary below      Assessment: pt continues to progress well with decreased pain with movement  Will add in weighted nv    Plan: Continue per plan of care        Precautions: DOS 2020      Manuals      B scalene, UT, and Lev scap stm  DB DB DL DL DL  DB     UPA, and PA t/s mobs T3-T7  Grade 3 DB DB DB 1DB nv  DB     T/s rot and ext MWM  DB DB DB DB nv  DB     Radial and median neve sliders    DL DL No needed       Neuro Re-Ed             Postural reeducation:             rows  blk 2x10 Silver 3''x3x10 nv blk 3x10 Silver 3x10 Silver 3x10 Silver 3x10     ext  Blue 2x10 blk 2x10 nv blk 3x10 blk 3x10 Silver 3x10 Silver 3x10     B ER  Green 2x10 Blue 3x10 nv Blue 3x10 blk 2x10 Blue 3x10  blue 3x10     PNF  Green 2x10 Blue 2x10 nv Blue 3x10 blk  2x10- blk  2x10 blk 3x10     Chin tucks  seated 5''x10 seated 5''x2x10 seated 5"  2x10 seated 5"2x10 seated  5"x20 seated 5"x20 seated 5''x20     Seated t/s ext  2x10 2x10 2x10 2x10 2x10 2x10 2x10     Seated t/s rot  2x10 2x10 2x10 2x10 2x10 2x10 2x10     Horizontal abd        Blue 2x10     Ther Ex             UBE   3'x3' np  2'x2' 3'x3' 3'x3'                                                                                                Ther Activity             Suitcase carry        20# 2 laps (nv)     Roa carry        25# 2 laps nv     Gait Training Modalities                          HEP DB

## 2021-01-28 NOTE — PROGRESS NOTES
Daily Note     Today's date: 2021  Patient name: Melisa Gill  : 1955  MRN: [id number]  Referring provider: Melissa Richard MD  Dx:   Encounter Diagnosis     ICD-10-CM    1  Cervical radiculopathy  M54 12    2  Post-operative pain  G89 18    3  Posture abnormality  R29 3    4  Postoperative examination  Z09                   Subjective: pt with noting stiffness in neck  Objective: See treatment diary below      Assessment: Tolerated treatment with fatigue in shoulder more with PNF band exercises 2* to TSR  Patient had less extreme tightness and spasm in B UT today with moderate only  More TTP over R scalenes       Plan: Continue per plan of care        Precautions: DOS 2020      Manuals     B scalene, UT, and Lev scap stm  DB DB DL DL DL  DB DL    UPA, and PA t/s mobs T3-T7  Grade 3 DB DB DB 1DB nv  DB DB    T/s rot and ext MWM  DB DB DB DB nv  DB DB    Radial and median neve sliders    DL DL No needed       Neuro Re-Ed             Postural reeducation:             rows  blk 2x10 Silver 3''x3x10 nv blk 3x10 Silver 3x10 Silver 3x10 Silver 3x10 silver 3x10    ext  Blue 2x10 blk 2x10 nv blk 3x10 blk 3x10 Silver 3x10 Silver 3x10 silver 3x10    B ER  Green 2x10 Blue 3x10 nv Blue 3x10 blk 2x10 Blue 3x10  blue 3x10 blk  3x10    PNF  Green 2x10 Blue 2x10 nv Blue 3x10 blk  2x10- blk  2x10 blk 3x10 blk 3x10    Chin tucks  seated 5''x10 seated 5''x2x10 seated 5"  2x10 seated 5"2x10 seated  5"x20 seated 5"x20 seated 5''x20 seated 5"x20    Seated t/s ext  2x10 2x10 2x10 2x10 2x10 2x10 2x10 2x10    Seated t/s rot  2x10 2x10 2x10 2x10 2x10 2x10 2x10 2x10    Horizontal abd        Blue 2x10 blk 2x10    Ther Ex             UBE   3'x3' np  2'x2' 3'x3' 3'x3' 3'x3'                                                                                               Ther Activity             Suitcase carry        20# 2 laps (nv)     Roa carry        25# 2 laps nv     Gait Training                                       Modalities                          HEP DB

## 2021-01-29 NOTE — TELEPHONE ENCOUNTER
I tried to schedule but hit a stop due to needing prior referral thru [facility]  I will check with Supervisor  2/1/21

## 2021-02-03 NOTE — PROGRESS NOTES
Daily Note     Today's date: 2/3/2021  Patient name: Shukri Evans  : 1955  MRN: [id number]  Referring provider: Lali Bradford MD  Dx:   Encounter Diagnosis     ICD-10-CM    1  Cervical radiculopathy  M54 12    2  Post-operative pain  G89 18    3  Posture abnormality  R29 3    4  Postoperative examination  Z09                   Subjective: pt notes that he is a bit more sore after having to do some shovelling over past 2 days  Objective: See treatment diary below      Assessment:   Patient with more tightness through B UT noted with manuals  He was able to complete exercises with more cues for form today and noted some elbow irritation again on RUE  Plan: Continue per plan of care        Precautions: DOS 2020      Manuals 12/30 1/5 1/7 1/11 1/14 1/18 1/21 1/25 1/28 2/3   B scalene, UT, and Lev scap stm  DB DB DL DL DL  DB DL DL   UPA, and PA t/s mobs T3-T7  Grade 3 DB DB DB 1DB nv  DB DB DB   T/s rot and ext MWM  DB DB DB DB nv  DB DB DB   Radial and median neve sliders    DL DL No needed       Neuro Re-Ed             Postural reeducation:             rows  blk 2x10 Silver 3''x3x10 nv blk 3x10 Silver 3x10 Silver 3x10 Silver 3x10 silver 3x10 Silver 3x10   ext  Blue 2x10 blk 2x10 nv blk 3x10 blk 3x10 Silver 3x10 Silver 3x10 silver 3x10 Silver 3x10   B ER  Green 2x10 Blue 3x10 nv Blue 3x10 blk 2x10 Blue 3x10  blue 3x10 blk  3x10 blk 3x10   PNF  Green 2x10 Blue 2x10 nv Blue 3x10 blk  2x10- blk  2x10 blk 3x10 blk 3x10 blk 3x10   Chin tucks  seated 5''x10 seated 5''x2x10 seated 5"  2x10 seated 5"2x10 seated  5"x20 seated 5"x20 seated 5''x20 seated 5"x20 seated 5"x20   Seated t/s ext  2x10 2x10 2x10 2x10 2x10 2x10 2x10 2x10 2x10   Seated t/s rot  2x10 2x10 2x10 2x10 2x10 2x10 2x10 2x10 2x10   Horizontal abd        Blue 2x10 blk 2x10 blk 2x10   Ther Ex             UBE   3'x3' np  2'x2' 3'x3' 3'x3' 3'x3' 3'x3' Ther Activity             Suitcase carry        20# 2 laps (nv)     Myrna Woodruffs carry        25# 2 laps nv     Gait Training                                       Modalities                          HEP DB

## 2021-02-08 NOTE — PROGRESS NOTES
Daily Note     Today's date: 2021  Patient name: Brendon Ramon  : 1955  MRN: [id number]  Referring provider: Geoff Chahal MD  Dx:   Encounter Diagnosis     ICD-10-CM    1  Cervical radiculopathy  M54 12    2  Post-operative pain  G89 18    3  Posture abnormality  R29 3    4  Postoperative examination  Z09                   Subjective: pt is with no new c/o offered  Objective: See treatment diary below      Assessment: Tolerated treatment with addition of carries with no difficulty with farmers or suitcase  Patient with tightness scalenes B but less in UT  Plan: Continue per plan of care     Change ext and row from theraband to cable column     Precautions: DOS 2020      Manuals 2/8       1/25 1/28 2/3   B scalene, UT, and Lev scap stm DL       DB DL DL   UPA, and PA t/s mobs T3-T7 np       DB DB DB   T/s rot and ext MWM np       DB DB DB   Radial and median neve sliders             Neuro Re-Ed             Postural reeducation:             rows Silver 3x10       Silver 3x10 silver 3x10 Silver 3x10   ext Silver 3x10       Silver 3x10 silver 3x10 Silver 3x10   B ER blk 3x10        blue 3x10 blk  3x10 blk 3x10   PNF blk 3x10       blk 3x10 blk 3x10 blk 3x10   Chin tucks 5"x20       seated 5''x20 seated 5"x20 seated 5"x20   Seated t/s ext 2x10       2x10 2x10 2x10   Seated t/s rot 2x10       2x10 2x10 2x10   Horizontal abd blk 3x10       Blue 2x10 blk 2x10 blk 2x10   Ther Ex             UBE lv 4 2 3'x3'       3'x3' 3'x3' 3'x3'                                                                                              Ther Activity             Suitcase carry 25#  2 laps       20# 2 laps (nv)     Farmer carry (2)25#  2 laps       25# 2 laps nv     Gait Training                                       Modalities                          HEP DB

## 2021-02-10 NOTE — PROGRESS NOTES
Daily Note     Today's date: 2/10/2021  Patient name: Geno Phoenix  : 1955  MRN: [id number]  Referring provider: Delilah Jin MD  Dx:   Encounter Diagnosis     ICD-10-CM    1  Cervical radiculopathy  M54 12    2  Post-operative pain  G89 18    3  Posture abnormality  R29 3                   Subjective: Pt notes that he actually feels okay  He has been able to turn his head while driving, and feels as if the surgery was a success      Objective: See treatment diary below      Assessment: pt able to tolerate transition to cable column exercises with only fatigue  Will continue to progress pt's strength as able but pt will be probable d/c to hep in 2-3 weeks    Plan: Continue per plan of care        Precautions: DOS 2020      Manuals 2/8 2/10      1/25 1/28 2/3   B scalene, UT, and Lev scap stm DL DB      DB DL DL   UPA, and PA t/s mobs T3-T7 np DB      DB DB DB   T/s rot and ext MWM np DB      DB DB DB   Radial and median neve sliders             Neuro Re-Ed             Postural reeducation:             rows Silver 3x10       Silver 3x10 silver 3x10 Silver 3x10   ext Silver 3x10 Silver 3x10 (55 nv)      Silver 3x10 silver 3x10 Silver 3x10   B ER blk 3x10 blk 3x10       blue 3x10 blk  3x10 blk 3x10   PNF blk 3x10 blk 3x10      blk 3x10 blk 3x10 blk 3x10   Chin tucks 5"x20       seated 5''x20 seated 5"x20 seated 5"x20   Seated t/s ext 2x10       2x10 2x10 2x10   Seated t/s rot 2x10       2x10 2x10 2x10   Horizontal abd blk 3x10 blk 3x10      Blue 2x10 blk 2x10 blk 2x10   Ther Ex             UBE lv 4 2 3'x3' lv 4 2 3'x3'      3'x3' 3'x3' 3'x3'   tricep ext  45# 3x10           Bicep   35# 3x10           rows  65# 3x10                                                               Ther Activity             Suitcase carry 25#  2 laps 26kb 2 laps      20# 2 laps (nv)     Farmer carry (2)25#  2 laps 26 and 18 kb 2 laps ea      25# 2 laps nv     Gait Training                                       Modalities HEP DB

## 2021-02-15 NOTE — PROGRESS NOTES
Daily Note     Today's date: 2/15/2021  Patient name: Aramis Greenwood  : 1955  MRN: [id number]  Referring provider: Donnie Coles MD  Dx:   Encounter Diagnosis     ICD-10-CM    1  Cervical radiculopathy  M54 12    2  Post-operative pain  G89 18    3  Posture abnormality  R29 3    4  Postoperative examination  Z09                   Subjective: pt continues to note feeling good with less pain  Objective: See treatment diary below      Assessment: Tolerated treatment with no increased sxs in neck   Patient with decreased tightness in UT, still with moderate amount of tightness through scalenes  Plan: Continue per plan of care        Precautions: DOS 2020      Manuals 2/8 2/10 2/15          B scalene, UT, and Lev scap stm DL DB DL          UPA, and PA t/s mobs T3-T7 np DB DB          T/s rot and ext MWM np DB DB          Radial and median neve sliders             Neuro Re-Ed             Postural reeducation:             rows Silver 3x10            ext Silver 3x10 Silver 3x10 (55 nv) 55#  3x10          B ER blk 3x10 blk 3x10 blk 3x10          PNF blk 3x10 blk 3x10 blk 3x10          Chin tucks 5"x20            Seated t/s ext 2x10            Seated t/s rot 2x10            Horizontal abd blk 3x10 blk 3x10 blk 3x10          Ther Ex             UBE lv 4 2 3'x3' lv 4 2 3'x3' lv 4 2 3'x3'          tricep ext  45# 3x10 45# x30          Bicep   35# 3x10 nv          rows  65# 3x10 65#  3x10                                                              Ther Activity             Suitcase carry 25#  2 laps 26kb 2 laps 26kb 2 laos          Marianne Germán carry (2)25#  2 laps 26 and 18 kb 2 laps ea 26 and 18 kb 2 laos          Gait Training                                       Modalities                          HEP DB

## 2021-02-17 NOTE — PROGRESS NOTES
Daily Note     Today's date: 2021  Patient name: Betty Mitchell  : 1955  MRN: [id number]  Referring provider: Bashir Packer MD  Dx:   Encounter Diagnosis     ICD-10-CM    1  Cervical radiculopathy  M54 12    2  Post-operative pain  G89 18    3  Posture abnormality  R29 3    4  Postoperative examination  Z09                   Subjective: Pt denies pain upon arrival to therapy today  Objective: See treatment diary below      Assessment: Good tolerance to progression of TE as noted with no c/o pain with exercise this session  Mobs held this session as pt reported no pain  Continues to have increased tightness b/l scalenes  Reports good relief of tightness following STM  Plan: Continue per plan of care        Precautions: DOS 2020      Manuals 2/8 2/10 2/15 2/17         B scalene, UT, and Lev scap stm DL DB DL RK         UPA, and PA t/s mobs T3-T7 np DB DB held         T/s rot and ext MWM np DB DB held         Radial and median neve sliders             Neuro Re-Ed             Postural reeducation:             rows Silver 3x10            ext Silver 3x10 Silver 3x10 (55 nv) 55#  3x10 65#  3x10         B ER blk 3x10 blk 3x10 blk 3x10 BLK 3x10         PNF blk 3x10 blk 3x10 blk 3x10 BLK  3x10         Chin tucks 5"x20            Seated t/s ext 2x10            Seated t/s rot 2x10            Horizontal abd blk 3x10 blk 3x10 blk 3x10 BLK  3x10         Ther Ex             UBE lv 4 2 3'x3' lv 4 2 3'x3' lv 4 2 3'x3' L 4 2  3'/3'         tricep ext  45# 3x10 45# x30 55#  3x10         Bicep   35# 3x10 nv 35#  3x10         rows  65# 3x10 65#  3x10 65#  3x10                                                             Ther Activity             Suitcase carry 25#  2 laps 26kb 2 laps 26kb 2 laos 26#kb 2 laps         Jeremias Drake carry (2)25#  2 laps 26 and 18 kb 2 laps ea 26 and 18 kb 2 laos 26#and 18# kb 2 laps         Gait Training                                       Modalities                          HEP DB

## 2021-03-29 PROBLEM — G30.0 EARLY ONSET ALZHEIMER'S DEMENTIA WITHOUT BEHAVIORAL DISTURBANCE (HCC): Status: ACTIVE | Noted: 2021-01-01

## 2021-03-29 PROBLEM — F02.80 EARLY ONSET ALZHEIMER'S DEMENTIA WITHOUT BEHAVIORAL DISTURBANCE (HCC): Status: ACTIVE | Noted: 2021-01-01

## 2021-03-29 NOTE — ASSESSMENT & PLAN NOTE
Current dose of Aricept 10 mg PO daily, monitored through [facility], will continue to assess level of orientation

## 2021-03-29 NOTE — PATIENT INSTRUCTIONS
1  Lorenzo the dental paperwork - so I know what procedure you will be approved, also prophylactic antibiotic  2  Obtain [facility] records   3  Obtain colonoscopy paperwork   4   Call of any problems or concerns

## 2021-03-29 NOTE — PROGRESS NOTES
Assessment/Plan:    Early onset Alzheimer's dementia without behavioral disturbance (HCC)  Current dose of Aricept 10 mg PO daily, monitored through [place], will continue to assess level of orientation         Problem List Items Addressed This Visit     None      Visit Diagnoses     Alzheimer's disease of other onset without behavioral disturbance ([facility] [address] )    -  Primary            Subjective:      Patient ID: Raleigh Watson is a 61 y o  male  Here today to establish care with this practice  Trung Summers receives the majority of his care through the [place], he completes lab work and sees specialities at the [place]    He reports he was diagnosed with alzheimer's dementia in 2018 and is currently prescribed Aricept through the neurologist at the [place], he reports he once was an  but now can barely read his name, although he is holding a conversation, and adding tasks to his phone while in the office  He reports that he needs clearance for a dental procedure and prophylactic antibiotics, but he is unable to relay the procedure or when it is to be done  He will obtain paperwork from the dentist so it may be completed  He has no other complaints at this time         The following portions of the patient's history were reviewed and updated as appropriate: allergies, current medications, past medical history, past social history, past surgical history and problem list     Review of Systems   Constitutional: Negative  HENT: Negative  Eyes: Negative  Respiratory: Negative  Cardiovascular: Negative  Gastrointestinal: Negative  Endocrine: Negative  Genitourinary: Negative  Musculoskeletal: Negative  Skin: Negative  Allergic/Immunologic: Negative  Neurological: Negative  Hematological: Negative  Psychiatric/Behavioral: Positive for confusion and decreased concentration  The patient is nervous/anxious            Objective:      /76 (BP Location: Left arm, Patient Position: Sitting, Cuff Size: Large)   Pulse 69   Temp (!) 97 1 °F (36 2 °C) (Tympanic)   Resp 16   Ht 6' 2 5" (1 892 m)   Wt 116 kg (256 lb 6 4 oz)   SpO2 96%   BMI 32 48 kg/m²          Physical Exam  Constitutional:       Appearance: He is not ill-appearing  HENT:      Head: Normocephalic and atraumatic  Right Ear: Tympanic membrane, ear canal and external ear normal  There is no impacted cerumen  Left Ear: Tympanic membrane, ear canal and external ear normal  There is no impacted cerumen  Nose: Nose normal  No congestion  Mouth/Throat:      Mouth: Mucous membranes are moist       Pharynx: No oropharyngeal exudate  Eyes:      General:         Right eye: No discharge  Left eye: No discharge  Extraocular Movements: Extraocular movements intact  Conjunctiva/sclera: Conjunctivae normal    Neck:      Musculoskeletal: Normal range of motion  Vascular: No carotid bruit  Cardiovascular:      Rate and Rhythm: Normal rate and regular rhythm  Pulses: Normal pulses  Heart sounds: Normal heart sounds  No murmur  Pulmonary:      Effort: Pulmonary effort is normal  No respiratory distress  Breath sounds: Normal breath sounds  Abdominal:      General: Bowel sounds are normal       Palpations: Abdomen is soft  Tenderness: There is no abdominal tenderness  There is no right CVA tenderness or left CVA tenderness  Musculoskeletal: Normal range of motion  General: No tenderness  Right lower leg: No edema  Left lower leg: No edema  Lymphadenopathy:      Cervical: No cervical adenopathy  Skin:     General: Skin is warm and dry  Neurological:      General: No focal deficit present  Mental Status: He is alert  Cranial Nerves: No cranial nerve deficit  Motor: No weakness        Coordination: Coordination normal       Gait: Gait normal    Psychiatric:         Mood and Affect: Mood normal          Behavior: Behavior normal          Thought Content:  Thought content normal          Judgment: Judgment normal

## 2021-04-01 NOTE — TELEPHONE ENCOUNTER
----- Message from Mark Bustos sent at 4/1/2021  8:52 AM EDT -----  Regarding: care gap request  04/01/21 8:52 AM    Hello, our patient attached above has had CRC: Colonoscopy completed/performed  Please assist in updating the patient chart by making an External outreach to [facility] [place] [address] located in [address]  The date of service is 12/9/16      Thank you,  Belén Rey MA  [facility]

## 2021-04-01 NOTE — LETTER
Procedure Request Form: Colonoscopy      Date Requested: 21  Patient: Sameul Pleasure  Patient : 1955   Referring Provider: MINNIE Tang        Date of Procedure _____16_________________________       The above patient has informed us that they have completed their   most recent Colonoscopy at your facility  Please complete   this form and attach all corresponding procedure reports/results  Comments __________________________________________________________  ____________________________________________________________________  ____________________________________________________________________  ____________________________________________________________________    Facility Completing Procedure _________________________________________    Form Completed By (print name) _______________________________________      Signature __________________________________________________________      These reports are needed for  compliance    Please fax this completed form and a copy of the procedure report to our office located at [place] [address] as soon as possible to [phone] attention Malini: Phone [phone]    We thank you for your assistance in treating our mutual patient

## 2021-04-06 NOTE — TELEPHONE ENCOUNTER
Upon review of the In Basket request and the patient's chart, initial outreach has been made via fax, please see Contacts section for details       Thank you  Alexa Rudolph MA

## 2021-05-04 NOTE — PROGRESS NOTES
[facility]        NAME: Franky Thomas is a 61 y o  male  : 1955    MRN: [id number]  DATE:  May 4, 2021  TIME: 6:58 PM    Assessment and Plan   Acute dysfunction of Eustachian tube, bilateral [H69 83]  1  Acute dysfunction of Eustachian tube, bilateral  methylPREDNISolone 4 MG tablet therapy pack   2  Sinus headache  methylPREDNISolone 4 MG tablet therapy pack   3  Tinnitus of both ears  methylPREDNISolone 4 MG tablet therapy pack         Patient Instructions     Discussed condition with patient  He has what appears to be a bilateral eustachian tube dysfunction which I believe is causing the tinnitus in his ears and this is due to sinus congestion  I will prescribe him a Medrol Dosepak and he may try over-the-counter Afrin but should avoid any oral decongestants  Should be re-evaluated if condition persists or worsens  Follow up with PCP in 3-5 days  Proceed to  ER if symptoms worsen  Chief Complaint     Chief Complaint   Patient presents with    Earache     Patient reports ear ringing bilateral for approx 4 days  He states he also feels pressure greater in the right ear   Headache         History of Present Illness         Patient presents with 4 day history of bilateral tinnitus as well as pressure in his ears  He reports sinus headaches and some congestion  Denies hearing loss, vertigo, otorrhea, or any other significant symptoms at this time  Review of Systems   Review of Systems   Constitutional: Negative  HENT: Positive for congestion, ear pain and tinnitus  Negative for ear discharge and hearing loss  Pertinent positives are bilateral   Respiratory: Negative  Cardiovascular: Negative  Gastrointestinal: Negative  Genitourinary: Negative  Neurological: Negative for dizziness           Current Medications       Current Outpatient Medications:     aspirin (ECOTRIN LOW STRENGTH) 81 mg EC tablet, Take 81 mg by mouth daily, Disp: , Rfl:     atorvastatin (LIPITOR) 80 mg tablet, Take 80 mg by mouth daily , Disp: , Rfl:     Cetirizine HCl 10 MG CAPS, Take 10 mg by mouth daily , Disp: , Rfl:     clopidogrel (PLAVIX) 75 mg tablet, Take 75 mg by mouth daily, Disp: , Rfl:     cyclobenzaprine (FLEXERIL) 10 mg tablet, Take 10 mg by mouth daily at bedtime , Disp: , Rfl:     hydrochlorothiazide (MICROZIDE) 12 5 mg capsule, TAKE ONE TABLET BY MOUTH EVERY DAY FOR BLOOD PRESSURE, Disp: , Rfl:     Multiple Vitamin (MULTIVITAMIN) tablet, Take 1 tablet by mouth daily, Disp: , Rfl:     pantoprazole (PROTONIX) 20 mg tablet, Take 20 mg by mouth daily , Disp: , Rfl:     sertraline (ZOLOFT) 100 mg tablet, Take 50 mg by mouth daily at bedtime , Disp: , Rfl:     Azelastine-Fluticasone 137-50 MCG/ACT SUSP, 1 spray by Each Nare route as needed , Disp: , Rfl:     donepezil (ARICEPT) 10 mg tablet, Take 10 mg by mouth daily at bedtime, Disp: , Rfl:     isosorbide mononitrate (IMDUR) 30 mg 24 hr tablet, TAKE ONE TABLET BY MOUTH DAILY FOR HEART AND/OR ANGINA, Disp: , Rfl:     methylPREDNISolone 4 MG tablet therapy pack, Use as directed on package, Disp: 21 each, Rfl: 0    metoprolol succinate (TOPROL-XL) 50 mg 24 hr tablet, Take 25 mg by mouth daily , Disp: , Rfl:     nitroglycerin (NITROSTAT) 0 4 mg SL tablet, Place 1 tablet (0 4 mg total) under the tongue every 5 (five) minutes as needed for chest pain, Disp: 30 tablet, Rfl: 1    Current Allergies     Allergies as of 05/04/2021    (No Known Allergies)            The following portions of the patient's history were reviewed and updated as appropriate: allergies, current medications, past family history, past medical history, past social history, past surgical history and problem list      Past Medical History:   Diagnosis Date    Alzheimer disease ([facility] [address] )     Arthritis     CAD (coronary artery disease)     Chronic right shoulder pain     Hyperlipidemia     Hypertension     Psoriasis     Sleep apnea     uses C pap       Past Surgical History:   Procedure Laterality Date    CHOLECYSTECTOMY      COLONOSCOPY      CORONARY ANGIOPLASTY WITH STENT PLACEMENT Left     FL GUIDED NEEDLE PLAC BX/ASP/INJ  2/24/2020    FL INJECTION RIGHT SHOULDER (ARTHROGRAM)  2/24/2020    KNEE SURGERY Right 05/2017    replacement    AR ARTHRODESIS ANT INTERBODY INC DISCECTOMY, CERVICAL BELOW C2 N/A 10/16/2020    Procedure: Anterior cervical discectomy and fixation fusion C4-5;  Surgeon: Dickson Velasco MD;  Location: [facility];  Service: Neurosurgery    SHOULDER SURGERY Right 11/2017    replacement       Family History   Problem Relation Age of Onset    Colon cancer Mother     Dementia Mother     Prostate cancer Father     Heart disease Father          Medications have been verified  Objective   /88   Pulse 66   Temp (!) 97 4 °F (36 3 °C)   Resp 16   Ht 6' 2" (1 88 m)   Wt 116 kg (255 lb)   SpO2 97%   BMI 32 74 kg/m²   No LMP for male patient  Physical Exam     Physical Exam  Vitals signs reviewed  Constitutional:       General: He is not in acute distress  Appearance: He is well-developed  HENT:      Right Ear: Hearing, ear canal and external ear normal       Left Ear: Hearing, ear canal and external ear normal       Ears:      Comments:  Bilateral dull TMs     Nose: Mucosal edema (  Bilateral boggy turbinates) and congestion present  Mouth/Throat:      Mouth: Mucous membranes are moist       Pharynx: Oropharynx is clear  Neck:      Musculoskeletal: Neck supple  Cardiovascular:      Rate and Rhythm: Normal rate and regular rhythm  Heart sounds: Normal heart sounds  No murmur  Pulmonary:      Effort: Pulmonary effort is normal  No respiratory distress  Breath sounds: Normal breath sounds  Lymphadenopathy:      Cervical: No cervical adenopathy  Neurological:      Mental Status: He is alert and oriented to person, place, and time

## 2021-05-04 NOTE — PATIENT INSTRUCTIONS
Eustachian Tube Dysfunction   AMBULATORY CARE:   Eustachian tube dysfunction (ETD)  is a condition that prevents your eustachian tubes from opening properly  It can also cause them to become blocked  Eustachian tubes connect your middle ear to the back of your nose and throat  These tubes open and allow air to flow in and out when you sneeze, swallow, or yawn  Common signs and symptoms include the following:   · Fullness or pressure in your ears    · Muffled hearing, or a feeling you are hearing under water or have clogged ears    · Pain in one or both ears    · Ringing in your ears    · Popping, crackling, or clicking feeling in your ears    · Trouble keeping your balance    Call your doctor or otolaryngologist if:   · Your symptoms do not improve or get worse  · You have a fever  · You have any hearing loss  · You have questions or concerns about your condition or care  Treatment:  ETD may get better on its own without any treatment  If it continues, you may need any of the following:  · Swallow, yawn, or chew gum  to help open your eustachian tubes  Your healthcare provider may also recommend you blow with your mouth shut and your nostrils pinched closed  · Air pressure devices  push air into your nose and eustachian tubes to help relieve air pressure in your ear  · Treatment for allergies  such as decongestants, antihistamines, and nasal steroids may improve ETD  They may help decrease swelling of the eustachian tubes  · A myringotomy  is surgery to make a hole in your eardrum  The hole relieves pressure and lets fluid drain from your ear  A pressure equalizing (PE) tube may be used to keep the hole open and to help drain fluid  · Tuboplasty  is a procedure to widen your eustachian tubes  Follow up with your doctor or otolaryngologist as directed:  Write down your questions so you remember to ask them during your visits    © Copyright [employer] 2020 Information is for End User's use only and may not be sold, redistributed or otherwise used for commercial purposes  All illustrations and images included in CareNotes® are the copyrighted property of A D A M [employer]  or Danica De  The above information is an  only  It is not intended as medical advice for individual conditions or treatments  Talk to your doctor, nurse or pharmacist before following any medical regimen to see if it is safe and effective for you

## 2021-05-10 NOTE — TELEPHONE ENCOUNTER
Patient had called to check the status of a form he needed filled out for a dental procedure he is supposed to be having but it is currently on hold due to needing his form  He would be having this done at [facility]  Please advise patient can be reached at [phone]

## 2021-05-10 NOTE — TELEPHONE ENCOUNTER
I am sure I completed this form, when you have a minute can you see if it is in his records somewhere

## 2021-05-21 PROBLEM — S06.5X9A SDH (SUBDURAL HEMATOMA) (HCC): Status: ACTIVE | Noted: 2021-01-01

## 2021-05-21 NOTE — CONSULTS
Consultation - Neurosurgery   Ying Melchor 61 y o  male MRN: [id number]  Unit/Bed#: TR13B Encounter: [id number]       ##  This is duplicate  consult documentation  See completed Consult documentation from 05/21 at 1328 hours and subsequent File time 1544 hours    Assessment/Plan     This initial consult chart locked - IT problem - sent to second tier analyst   Patient epic location change  No soon reply back  So New Consult opened instead and completed    See my Completed Consult filed 1544 hours    History, ROS and PFSH unobtainable from any source due to intubated/coma    HPI: Ying Melchor is a 61y o  year old male who presents with coma    Transfer from [address] [place] [address]   Reason unable to perform ROS: Intubated coma  not obtainable from patient  2/2 coma    Historical Information   History reviewed  No pertinent past medical history  History reviewed  No pertinent surgical history  Social History     Substance and Sexual Activity   Alcohol Use None     Social History     Substance and Sexual Activity   Drug Use Not on file     Social History     Tobacco Use   Smoking Status Unknown If Ever Smoked     History reviewed  No pertinent family history  Meds/Allergies   all current active meds have been reviewed  No current facility-administered medications for this encounter  No current outpatient medications on file  Exam - see completed other Consult      Intake/Output Summary (Last 24 hours) at 5/21/2021 1339  Last data filed at 5/21/2021 1149  Gross per 24 hour   Intake --   Output 1025 ml   Net -1025 ml       Vitals:Blood pressure 165/98, pulse 80, temperature (!) 96 4 °F (35 8 °C), temperature source Probe, resp  rate 18, weight 90 7 kg (200 lb), SpO2 98 %  ,There is no height or weight on file to calculate BMI  Lab Results: I have personally reviewed pertinent results        Imaging Studies: I have personally reviewed pertinent films in PACS with a Radiologist     [facility] neuro Radiology    EKG, Pathology, and Other Studies: I have personally reviewed pertinent reports  VTE Prophylaxis: Sequential compression device Malathi Monk     Code Status  Advance Directive and Living Will:   Power of :  POLST:    Counseling / Coordination of Care  Counseling/Coordination of Care: Total floor / unit time spent today 120 mins minutes  Greater than 50% of total time was spent with the patient and / or family counseling and / or coordination of care  A description of the counseling / coordination of care: see assessment and plan documentation above for description  5/21/2021 1:39 PM    Dorothy Person MD, Attending Neurological Surgeon

## 2021-05-21 NOTE — ED PROVIDER NOTES
Emergency Department Airway Evaluation and Management Form    History  Obtained from: EMS  Patient has no allergy information on record  No chief complaint on file  Fall, stroke alert to [address]  Found to have large multicompartmental ICH  Intubated at [facility] Level A P1 tx to [facility] trauma bay for NSX eval            No past medical history on file  No past surgical history on file  No family history on file  Social History     Tobacco Use    Smoking status: Unknown If Ever Smoked   Substance Use Topics    Alcohol use: Not on file    Drug use: Not on file     I have reviewed and agree with the history as documented      Review of Systems    Physical Exam  /86   Pulse 55   Temp (!) 97 °F (36 1 °C) (Probe)   Resp 16   SpO2 98%     Physical Exam    ED Medications  Medications   levETIRAcetam (KEPPRA) 1,000 mg in sodium chloride 0 9 % 100 mL IVPB (has no administration in time range)   neomycin-polymyxin B (NEOSPORIN-) 1 mL in sodium chloride 0 9 % 1,000 mL irrigation (has no administration in time range)   propofol (DIPRIVAN) 200 MG/20ML bolus injection (5 mcg/kg/min × 90 7 kg Intravenous New Bag 5/21/21 1227)   fentanyl citrate (PF) 100 MCG/2ML (100 mcg Intravenous Given 5/21/21 1230)       Intubation  Procedures    Notes  ETT Confirmed    Final Diagnosis  Final diagnoses:   None       ED Provider  Electronically Signed by     Osvaldo White MD  05/21/21 5987

## 2021-05-21 NOTE — RESPIRATORY THERAPY NOTE
RT Ventilator Management Note  Arjun Edmonds 61 y o  male MRN: [id number]  Unit/Bed#: TR13B Encounter: [id number]      Daily Screen     No data found in the last 10 encounters  Physical Exam:   Assessment Type: Assess only  General Appearance: Unresponsive  Respiratory Pattern: Normal  Chest Assessment: Chest expansion symmetrical  Bilateral Breath Sounds: Clear      Resp Comments: (P) Pt arrived from OR without notice  Pt placed on vent in ICU  BS clear  No know pulm hx  Will cont to monitor per resp protocol

## 2021-05-21 NOTE — QUICK NOTE
Called by  regarding stroke alert at 10:44, neuro response was immediate  61year old M presenting with unresponsive after fall     - NIHSS 42  - LKW < 1 hours      CTH larger holo-hemispheric acute SDH on the L, with R to L shift  CTA deffered      IV tpa was not given 2/2 above sdh    Med history reports DAPT  Recommend DDAVP 0 3 mcg/kg IV over 30 min  - SBP < 140, prefer titratable agent, ie nicardapine  - call NSX for tentative evacuation and subsequent [facility] emolization evaluation  - no need for empiric AED, no seizure-like activity    - hold all AP/AC

## 2021-05-21 NOTE — CASE MANAGEMENT
Pt's son and wife arrived and met with the medical team in the ICU waiting room to discuss the pt's condition     Family now at bedside

## 2021-05-21 NOTE — QUICK NOTE
SCC PROGRESS NOTE    Neurologically re-evaluated at bedside  GCS: 3t    Pupils: 7mm fixed and dilated    Corneal reflexes: Bilaterally    Patient is over breathing the vent  Will continue to closely monitor

## 2021-05-21 NOTE — CONSULTS
[id number] [address] [facility] 61 y o  male MRN: [id number]  Unit/Bed#: ICU 09 Encounter: [id number]      -------------------------------------------------------------------------------------------------------------  Chief Complaint: Fall, Fixed Pupils, Large acute and hyperacute left subdural hematoma, midbrain shifted to the right, 1 7cm midline shift    History of Present Illness   HX and PE limited by: AMS, intubation, inability to contact family    Adama Cheema is a 61 y o  male, with a history significant for CAD s/p cardiac stenting on dual antiplatelet therapy, who presented, as a level A trauma transfer via EMS, due to large acute and hyperacute left subdural hematoma with midline shift, deteriorating GCS, and fixed pupils  "Per EMS, patient was walking his dog when he was witnessed to collapse  EMS was called and he was brought to outside hospital  Due to his unresponsiveness, he was intubated using etomidate and succinylcholine for RSI  He was subsequently transferred and hypertonics were administered  " DDAVP was also given  History obtained from chart review, EMS  It was unobtainable from patient due to mental status and family as attempts to make contact were unsuccessful    -------------------------------------------------------------------------------------------------------------  Assessment and Plan:    Neuro:    Diagnosis: Large acute and hyperacute left subdural hematoma with midline and midbrain shift  o Plan:   o Per neurosurgery, patient has suffered a "non-survivable injury without chance of meaningful recovery "  o Operative management was aborted for this reason  o Patient has been made Level 2  o Further attempts to discuss with the patient's contact will be made     o Plan to discuss gift of life  o CAM-ICU  o Goals RAAS 0  o Sleep-Wake Cycle Regulation  o Keppra administered  o No anticoagulation, no antiplatelet agents  o Maintain normothermia, avoid hypoxemia o Elevate HOB      CV:    Diagnosis: No acute issues, history of CAD s/p stenting  o Plan:   o Hold antiplatelet agents due to [facility]  o Continue to closely monitor hemodynamics  o Closely monitor on tele      Pulm:   Diagnosis: No acute issues  o Plan:   o Patient intubated for airway protection  o Good pulm hygene  o Frequent suctioning      GI:    Diagnosis: No acute issues  o Plan:   - Famotidine GI prophylaxis     :    Diagnosis: No acute issues  o Plan:   o Trend BMP  o Strict I&Os      F/E/N:    Plan:    Fluids: Isolyte maintinence   Electrolytes: Trend and replete as needed   Nutrition: NPO      Heme/Onc:    Diagnosis: Anti-platelet medication coagulopathy  o Plan:  - S/p reversal with DDAVP       Endo:    Diagnosis: No acute issues  o Plan:   - ISS  - Maintain BS between 140-180      ID:    Diagnosis: No acute issues  o Plan:   o Continue to closely monitor for signs of infection  o Trend daily fever curve and WBC      MSK/Skin:    Diagnosis:   o Plan:   o Continue to turn and reposition frequently  o Continue pressure off-loading  o PT/OT    Disposition: Admit to Critical Care   Code Status: Level 2 - DNAR: but accepts endotracheal intubation  --------------------------------------------------------------------------------------------------------------  Review of Systems   Unable to perform ROS: Mental status change       Review of systems was unable to be performed secondary to AMS    Physical Exam  Vitals signs reviewed  Constitutional:       Appearance: He is ill-appearing and toxic-appearing  Comments: Patient intubated   HENT:      Head: Normocephalic  Right Ear: External ear normal       Left Ear: External ear normal       Nose: Nose normal       Mouth/Throat:      Comments: Intubated  Eyes:      General:         Right eye: No discharge  Left eye: No discharge  Comments: Fixed/nonreactive dilated pupils bilaterally    Neck:      Musculoskeletal: Neck supple  Cardiovascular:      Rate and Rhythm: Regular rhythm  Bradycardia present  Comments: 2+ radial, thready DP  Pulmonary:      Breath sounds: No stridor  No wheezing, rhonchi or rales  Comments: Patient intubated  Abdominal:      Palpations: Abdomen is soft  Tenderness: There is no guarding or rebound  Musculoskeletal:      Right lower leg: No edema  Left lower leg: No edema  Comments: No C, T, L spine step offs or deformities   Skin:     General: Skin is warm and dry  Capillary Refill: Capillary refill takes less than 2 seconds  Neurological:      Comments: GCS 3T   Psychiatric:      Comments: Unable to assess due to AMS       --------------------------------------------------------------------------------------------------------------  Vitals:   Vitals:    05/21/21 1229 05/21/21 1305 05/21/21 1345 05/21/21 1400   BP: 151/86  154/75 152/68   Pulse: 55  (!) 52 (!) 48   Resp: 16  13 17   Temp: (!) 97 °F (36 1 °C)      TempSrc: Probe      SpO2: 98% 100% 100% 99%     Temp  Min: 95 7 °F (35 4 °C)  Max: 97 °F (36 1 °C)        There is no height or weight on file to calculate BMI    N/A    Laboratory and Diagnostics:  Results from last 7 days   Lab Units 05/21/21  1233 05/21/21  1224 05/21/21  1049 05/21/21  1044   WBC Thousand/uL  --  16 19* 12 61*  --    HEMOGLOBIN g/dL  --  16 2 16 4  --    I STAT HEMOGLOBIN g/dl 17 0  --   --  17 3*   HEMATOCRIT %  --  50 7* 51 3*  --    HEMATOCRIT, ISTAT % 50*  --   --  51*   PLATELETS Thousands/uL  --  256 268  --    NEUTROS PCT %  --  77*  --   --    MONOS PCT %  --  8  --   --      Results from last 7 days   Lab Units 05/21/21  1233 05/21/21  1224 05/21/21  1049 05/21/21  1044   SODIUM mmol/L  --  141 146*  --    POTASSIUM mmol/L  --  3 7 3 7  --    CHLORIDE mmol/L  --  110* 107  --    CO2 mmol/L  --  26 28  --    CO2, I-STAT mmol/L 27  --   --  30   ANION GAP mmol/L  --  5 11  --    BUN mg/dL  --  18 18  --    CREATININE mg/dL  --  1 00 1 16  -- CALCIUM mg/dL  --  9 0 8 6  --    GLUCOSE RANDOM mg/dL  --  114 112  --    ALT U/L  --  31  --   --    AST U/L  --  26  --   --    ALK PHOS U/L  --  115  --   --    ALBUMIN g/dL  --  3 7  --   --    TOTAL BILIRUBIN mg/dL  --  0 77  --   --           Results from last 7 days   Lab Units 05/21/21  1049   INR  0 94   PTT seconds 27      Results from last 7 days   Lab Units 05/21/21  1049   TROPONIN I ng/mL <0 02         ABG:    VBG:          Micro:        EKG:   Imaging: I have personally reviewed the pertinent films    Historical Information   No past medical history on file  No past surgical history on file  Social History   Social History     Substance and Sexual Activity   Alcohol Use None     Social History     Substance and Sexual Activity   Drug Use Not on file     Social History     Tobacco Use   Smoking Status Unknown If Ever Smoked     Exercise History: Walks dog  Family History:   No family history on file   See: patient's other chart  I have reviewed this patient's family history and commented on sigificant items within the HPI      Medications:  Current Facility-Administered Medications   Medication Dose Route Frequency    famotidine (PEPCID) oral suspension 20 mg  20 mg Oral BID    insulin lispro (HumaLOG) 100 units/mL subcutaneous injection 1-6 Units  1-6 Units Subcutaneous Q6H [address]    multi-electrolyte (PLASMALYTE-A/ISOLYTE-S PH 7 4) IV solution  125 mL/hr Intravenous Continuous     Home medications:  None     Allergies:  Not on File  ------------------------------------------------------------------------------------------------------------  Advance Directive and Living Will:      Power of :    POLST:    ------------------------------------------------------------------------------------------------------------  Anticipated Length of Stay is > 2 midnights    Care Time Delivered:   No Critical Care time spent       Shaun Le MD        Portions of the record may have been created with voice recognition software  Occasional wrong word or "sound a like" substitutions may have occurred due to the inherent limitations of voice recognition software    Read the chart carefully and recognize, using context, where substitutions have occurred

## 2021-05-21 NOTE — CONSULTS
Consultation - Neurosurgery   Caren Mazariegos 61 y o  male MRN: [id number]  Unit/Bed#: ICU 09 Encounter: [id number]    Additional consult note    Patient has duplicate chart and number    Assessment/Plan     Diagnoses:  1  Coma  2  Fixed dilated left pupil  GCS 5 to 6 some flexion movements left-sided  3  Reported walking with his dog then stumble then fall by walker Witness on trial  4  Possible ASA ? ? DAPT  5  INR 0 94  6  Large acute and hyperacute left subdural hematoma  7  Maximal width mid temporal region 2 1 cm  8  Midline shift 1 7 cm  9  Significantly effaced mesencephalic cistern  10  Mid brain shifted to right as well into right incisura  11  Basal cistern subarachnoid blood noted  12  Thin layered fell sign and tentorial subdural hemorrhage also  13  Gray-white matter preserved at this time  14  No obvious cerebral infarction  15  The preponderance ofblood is subdural over the right left whole hemisphere     No family available for history     Presented in extremis to [facility])  ER    Further PMH information later obtained from second chart    He is a  61 y o  male, with a history significant for CAD s/p cardiac stenting on dual antiplatelet therapy, who presented, as a level A trauma transfer via EMS, due to large acute and hyperacute left subdural hematoma with midline shift, deteriorating GCS, and fixed pupils  "Per EMS, patient was walking his dog when he was witnessed to collapse  EMS was called and he was brought to outside hospital   To be bradycardic in the 30s     Due to his unresponsiveness, he was intubated using etomidate and succinylcholine for RSI  He was subsequently transferred and hypertonics were administered  " DDAVP was also given      Imaging showed extensive left-sided holohemisphere subdural hemorrhage with hyperacute features and  some subarachnoid hemorrhage      Concern for may be preceding intracranial event with possible possible left-sided MCA aneurysm with then extensive large left-sided subdural     Patient now intubated     PMH:  Prior ACDF with Dr Sydnee Sosa 2020  Prior shoulder surgery and knee surgery  Recommend stabilization transfer from [address]  1  Mannitol 25 g IV over 20 m  2  CBC diff CMP coags  3  CTA head neck  4  Transferred urgently to [place] [address]   5  To trauma triage - discussed with Dr Edwige Bernstein  6  Stat cross match 2 units   7   CTA head neck if not completed at  UB     Exam on arrival [place] [address]   Seen in triage with trauma team  His fixed dilated pupils 7 8 mm nonreactive  No left corneal response  Faint right corneal response    No response to PS for me with central stimulation or  peripheral PS stimui inner  arms and inner thigh    CTA head neck obtained:  Reviewed by me and later with Dr Raymond Schlatter from neuro Radiology and subsequently with   [facility]  · Large left whole 0 hemispheric subdural hematoma acute with significant mass effect  · Widest dimension 2 3 cm  · There is 1 8 mm left-to-right midline shift with both central and uncal herniation  · The left lateral ventricle is further effaced and displaced across the midline  · There is some subarachnoid blood in the basal cisterns more on the left side than the right  · The pre ponder ends of blood is the extensive mixed density hyperacute blood over the left holohemisphere  · Is layered blood along the falx and along both tentorial surface is  · This now areas of hypodensity in the inferior left frontal lobe suggesting ischemia and infarction  · Potential hyperdense streak changes for duret  Hemorrhages  · New blood has migrated within the fourth ventricle and around the rule right side of the mesencephalic cister  · Medial displacement of peripheral left MCA vasculature  · Possible so stretched cortical vessels  · Possible venous blush surface mid frontotemporal lateral displaced hemisphere  · No obvious aneurysm seen in all the major areas of bifurcation were aneurysms more commonly seen  · Both sets branches of anterior cerebral arteries displaced across the midline by the falcine herniation and displacement      Presentation imaging discussed with Dr Dea Whatley at  [facility] and with Dr  Dr Maximo Foote and Dr Twin Holland set up in case patient was deemed possible neurosurgical candidate    However clinical exam and imaging findings indicate potential for any recovery extremely poor and dismal    Extensive discussion with Dr Arjun Connors with review of images and events and presenting exam at [facility] and presenting exam now at [place] [address]  Presentation imaging also discussed Dr Trevor Case and Dr Mitch Connors has reviewed images independently and  also with me    Clinical and CT imaging shows central and transtentorial herniation  Surgical intervention would be futile in this setting    Patient seen and examined also in [address] [place] [address] by Dr Kait Puri then was not to proceed with any neurosurgical intervention - futile in the face of established transtentorial herniation with fixed dilated pupils    Messages left with family contact numbers by [facility] and also by Trauma and case coordinator Claudio Haile  No reply back    Plan:  Supportive and comfort care measures    History of Present Illness  as above      History, ROS and PFSH unobtainable from any source due to presentation in coma    HPI: Adama Cheema is a 61y o  year old male who presents with coma as above    Review of Systems not obtainable from patient    Historical Information   No past medical history on file  No past surgical history on file  Social History     Substance and Sexual Activity   Alcohol Use None     Social History     Substance and Sexual Activity   Drug Use Not on file     Social History     Tobacco Use   Smoking Status Unknown If Ever Smoked     No family history on file      Meds/Allergies   all current active meds have been reviewed  Current Facility-Administered Medications   Medication Dose Route Frequency Provider Last Rate Last Admin    famotidine (PEPCID) oral suspension 20 mg  20 mg Oral BID Reji Borges MD        insulin lispro (HumaLOG) 100 units/mL subcutaneous injection 1-6 Units  1-6 Units Subcutaneous Q6H Laquita Stephens MD        multi-electrolyte (PLASMALYTE-A/ISOLYTE-S PH 7 4) IV solution  75 mL/hr Intravenous Continuous Reji Borges MD           Not on File      Physical Exam  Neurologic Exam as above    Impression:    Intake/Output Summary (Last 24 hours) at 5/21/2021 1523  Last data filed at 5/21/2021 1501  Gross per 24 hour   Intake --   Output 925 ml   Net -925 ml       Vitals:Blood pressure 126/54, pulse (!) 46, temperature (!) 96 6 °F (35 9 °C), resp  rate 16, SpO2 99 %  ,There is no height or weight on file to calculate BMI  Lab Results: I have personally reviewed pertinent results  Imaging Studies: I have personally reviewed pertinent films in PACS with a Radiologist     EKG, Pathology, and Other Studies: I have personally reviewed pertinent reports  VTE Prophylaxis: Sequential compression device Nkechi Juan)     Code Status: Level 2 - DNAR: but accepts endotracheal intubation  Advance Directive and Living Will:      Power of :    POLST:      Counseling / Coordination of Care  Counseling/Coordination of Care: Total floor / unit time spent today 120 minutes minutes  Greater than 50% of total time was spent with the patient and / or family counseling and / or coordination of care  A description of the counseling / coordination of care: see assessment and plan documentation above for description  5/21/2021 3:23 PM    Frances Mcallister MD, Attending Neurological Surgeon

## 2021-05-21 NOTE — PROGRESS NOTES
Contacted by Dr Sandi Gallardo regarding patient with GCS 5-6 at [address]  Patient was noted to stumble and fall at approximately 10:30 while walking his dog  Patient was intubated and CT scan demonstrated acute left subdural hematoma  With severe brain compression and left-to-right midline shift and trapping of the right ventricular system  Patient was transferred to [facility] for emergent evaluation  Per report, patient's pupils were fixed and dilated prior to transfer and GCS had declined  CTA head obtained at [facility] does not demonstrate an obvious underlying aneurysm  With the left-to-right midline shift and brain compression has worsened as has trapping of the right ventricular system  There is hypodensity in left frontal lobe concerning for stroke  There is hyperdensity within the brainstem concerning for possible durette hemorrhage  On examination in OR patient was noted to have 8 mm fixed and dilated pupils  No cough or gag  GCS 3T  A possible mild right corneal   No response to painful or peripheral stimulation in the upper lower extremities  No sedation at recently been given  Patient had 4 twitches on testing  After extensive discussion with Dr Sandi Gallardo and with the trauma team, it was felt that this is a non survival injury without chance of meaningful recovery  Would not recommend any aggressive intervention  Patient will be kept comfort care with trauma team involve family is contacted

## 2021-05-21 NOTE — ED PROVIDER NOTES
History  Chief Complaint   Patient presents with   [address]     pt presents to [facility] after a bystander witnessed pt fall to the ground while walking his dog  patient was unresponsive, and showing signs of left sided weakness  Patient is a 61year old male brought in by ambulance unresponsive  Per EMS, a bystander was walking on the walking path, saw the patient walking a dog, started stumbling around, then fell and was unresponsive  Called 911  EMS reports that patient was bradycardic, sonorous respirations, only withdrew RUE and RLE to pain  Required external cardiac pacing for bradycardia in the 30s, then got 1mg atropine IV and HR improved and patient got a BP  EMS bagging upon arrival  On arrival to the ED, patient is unresponsive, sonorous respirations, being bagged, left pupil is fixed and dilated, withdraws RUE and RLE to pain, but otherwise unresponsive  Stroke alert called  Patient immediately intubated with RSI (30mg etomidate, 120mg succinylcholine) and then taken to CT scanner for [facility] which revealed massive SDH/SAH with midline shift and uncal herniation  Review of medical records did show early onset dementia and questionable aspirin use, therefore patient was administered DDAVP 0 3mcg/kg  Discussed case with Dr Robby Worrell- neurology secondary to stroke alert, but then with the massive hemorrhage visualized, called neurosurgery and spoke with Dr Noelle Herron who recommended level 1 priority transfer to [facility] trauma ICU  Patient was given 25g mannitol secondary to herniation, 10mg hydralazine as SBP was 180 and was started on cardene infusion to maintain SBP < 160 in setting of intracranial hemorrhage  SBP allowing to get to 160 as concerned that patient was originally extremely bradycardic requiring pacing and atropine  Patient was also given 1L NS bolus  Attempted to call patient's wife to give update, but unable to reach, left voicemail  None       History reviewed  No pertinent past medical history  History reviewed  No pertinent surgical history  History reviewed  No pertinent family history  I have reviewed and agree with the history as documented  E-Cigarette/Vaping     E-Cigarette/Vaping Substances     Social History     Tobacco Use    Smoking status: Unknown If Ever Smoked   Substance Use Topics    Alcohol use: Not on file    Drug use: Not on file       Review of Systems   Unable to perform ROS: Patient unresponsive     Airway intact  Equal breath sounds bilaterally  Normal pulses  GCS 6  Appropriately exposed    Physical Exam  Physical Exam  Vitals signs and nursing note reviewed  Constitutional:       General: He is in acute distress  Appearance: Normal appearance  He is obese  He is ill-appearing  He is not toxic-appearing or diaphoretic  HENT:      Head: Normocephalic and atraumatic  Mouth/Throat:      Mouth: Mucous membranes are moist    Eyes:      Conjunctiva/sclera: Conjunctivae normal       Comments: Left pupil blown and non-reactive, right pupil 4mm and reactive sluggishly   Neck:      Comments: In cervical collar    Cardiovascular:      Rate and Rhythm: Normal rate and regular rhythm  Pulses: Normal pulses  Heart sounds: Normal heart sounds  Pulmonary:      Comments: Prior to intubation, patient with sonorous respirations, but post intubation equal breath sounds while bagging  Abdominal:      General: Bowel sounds are normal  There is no distension  Palpations: Abdomen is soft  Tenderness: There is no abdominal tenderness  There is no guarding or rebound  Musculoskeletal:      Right lower leg: No edema  Left lower leg: No edema  Comments: No outward signs of trauma present  Cervical collar in place   Skin:     General: Skin is warm and dry  Neurological:      GCS: GCS eye subscore is 1  GCS verbal subscore is 1  GCS motor subscore is 4  Comments:  Withdraws RUE to painful stimulus(when IV being placed)         Vital Signs  ED Triage Vitals   Temperature Pulse Respirations Blood Pressure SpO2   05/21/21 1115 05/21/21 1042 05/21/21 1042 05/21/21 1042 05/21/21 1041   (!) 96 4 °F (35 8 °C) (!) 106 18 (!) 183/99 97 %      Temp Source Heart Rate Source Patient Position - Orthostatic VS BP Location FiO2 (%)   05/21/21 1115 05/21/21 1042 05/21/21 1042 -- --   Probe Monitor Lying        Pain Score       --                  Vitals:    05/21/21 1115 05/21/21 1120 05/21/21 1123 05/21/21 1125   BP: 154/79 170/82 (!) 180/72 (!) 175/84   Pulse: 75 67  67   Patient Position - Orthostatic VS:             Visual Acuity  Visual Acuity      Most Recent Value   L Pupil Size (mm)  7   R Pupil Size (mm)  7          ED Medications  Medications   propofol (DIPRIVAN) 1000 mg in 100 mL infusion (premix) (5 mcg/kg/min × 90 7 kg Intravenous New Bag 5/21/21 1110)   fentaNYL 1000 mcg in sodium chloride 0 9% 100mL infusion (0 mcg/hr Intravenous Hold 5/21/21 1149)   niCARdipine (CARDENE) 25 mg (STANDARD CONCENTRATION) in sodium chloride 0 9% 250 mL (0 mg/hr Intravenous Hold 5/21/21 1149)   desmopressin (DDAVP) 27 2 mcg in sodium chloride 0 9 % 50 mL IVPB (0 mcg Intravenous Given to EMS 5/21/21 1153)   etomidate (AMIDATE) 2 mg/mL injection 30 mg (30 mg Intravenous Given 5/21/21 1041)   Succinylcholine Chloride 100 mg/5 mL syringe 120 mg (120 mg Intravenous Given 5/21/21 1116)   atropine (FOR EMS ONLY) 1 mg/10 mL injection 1 mg (0 mg Does not apply Given to EMS 5/21/21 1116)   hydrALAZINE (APRESOLINE) injection 10 mg (10 mg Intravenous Given 5/21/21 1123)   mannitol 20 % IVPB premix 25 g 125 mL (0 g Intravenous Stopped 5/21/21 1148)   sodium chloride 0 9 % bolus 1,000 mL (0 mL Intravenous Stopped 5/21/21 1153)       Diagnostic Studies  Results Reviewed     Procedure Component Value Units Date/Time    Novel Coronavirus (Covid-19),PCR [facility] - 2 hour stat [[id number]]  (Normal) Collected: 05/21/21 1049    Lab Status: Final result Specimen: Nares from Nose Updated: 05/21/21 1204     SARS-CoV-2 Negative    Narrative: The specimen collection materials, transport medium, and/or testing methodology utilized in the production of these test results have been proven to be reliable in a limited validation with an abbreviated program under the Emergency Utilization Authorization provided by the FDA  Testing reported as "Presumptive positive" will be confirmed with secondary testing to ensure result accuracy  Clinical caution and judgement should be used with the interpretation of these results with consideration of the clinical impression and other laboratory testing  Testing reported as "Positive" or "Negative" has been proven to be accurate according to standard laboratory validation requirements  All testing is performed with control materials showing appropriate reactivity at standard intervals        Protime-INR [[id number]]  (Normal) Collected: 05/21/21 1049    Lab Status: Final result Specimen: Blood from Arm, Right Updated: 05/21/21 1115     Protime 12 6 seconds      INR 0 94    APTT [[id number]]  (Normal) Collected: 05/21/21 1049    Lab Status: Final result Specimen: Blood from Arm, Right Updated: 05/21/21 1115     PTT 27 seconds     Troponin I [[id number]]  (Normal) Collected: 05/21/21 1049    Lab Status: Final result Specimen: Blood from Arm, Right Updated: 05/21/21 1114     Troponin I <0 02 ng/mL     Basic metabolic panel [[id number]]  (Abnormal) Collected: 05/21/21 1049    Lab Status: Final result Specimen: Blood from Arm, Right Updated: 05/21/21 1106     Sodium 146 mmol/L      Potassium 3 7 mmol/L      Chloride 107 mmol/L      CO2 28 mmol/L      ANION GAP 11 mmol/L      BUN 18 mg/dL      Creatinine 1 16 mg/dL      Glucose 112 mg/dL      Calcium 8 6 mg/dL      eGFR 66 ml/min/1 73sq m     Narrative:      [place] guidelines for Chronic Kidney Disease (CKD):     Stage 1 with normal or high GFR (GFR > 90 mL/min/1 73 square meters)    Stage 2 Mild CKD (GFR = 60-89 mL/min/1 73 square meters)    Stage 3A Moderate CKD (GFR = 45-59 mL/min/1 73 square meters)    Stage 3B Moderate CKD (GFR = 30-44 mL/min/1 73 square meters)    Stage 4 Severe CKD (GFR = 15-29 mL/min/1 73 square meters)    Stage 5 End Stage CKD (GFR <15 mL/min/1 73 square meters)  Note: GFR calculation is accurate only with a steady state creatinine    CBC and Platelet [[id number]]  (Abnormal) Collected: 05/21/21 1049    Lab Status: Final result Specimen: Blood from Arm, Right Updated: 05/21/21 1058     WBC 12 61 Thousand/uL      RBC 5 73 Million/uL      Hemoglobin 16 4 g/dL      Hematocrit 51 3 %      MCV 90 fL      MCH 28 6 pg      MCHC 32 0 g/dL      RDW 14 7 %      Platelets 409 Thousands/uL      MPV 10 2 fL     POCT Blood Gas (CG8+) [[id number]]  (Abnormal) Collected: 05/21/21 1044    Lab Status: Final result Specimen: Venous Updated: 05/21/21 1056     ph, Akhil ISTAT 7 369     pCO2, Akhil i-STAT 49 4 mm HG      pO2, Akhil i-STAT 225 0 mm HG      BE, i-STAT 2 mmol/L      HCO3, Akhil i-STAT 28 5 mmol/L      CO2, i-STAT 30 mmol/L      O2 Sat, i-STAT 100 %      SODIUM, I-STAT 144 mmol/l      Potassium, i-STAT 3 7 mmol/L      Calcium, Ionized i-STAT 1 20 mmol/L      Hct, i-STAT 51 %      Hgb, i-STAT 17 3 g/dl      Glucose, i-STAT 110 mg/dl      Specimen Type VENOUS                 CT stroke alert brain   Final Result by Gia Christopher MD (05/21 1130)      Large acute left hemispheric subdural hematoma with questionable subarachnoid hemorrhage  Significant mass effect with subfalcine and uncal herniation  Dilated temporal horn and atria of the right lateral ventricle         Findings were directly discussed with Dr Oz Taylor on 5/21/2021 11:15 AM  Also reviewed with Derian Hartmann at 11:28 AM       Workstation performed: [id number]         X-ray chest 1 view portable    (Results Pending)              Procedures  ECG 12 Lead Documentation Only    Date/Time: 5/21/2021 12:08 PM  Performed by: Brina Seals DO  Authorized by: Brina Seals DO     Indications / Diagnosis:  Unresponsive  ECG reviewed by me, the ED Provider: yes    Patient location:  ED  Previous ECG:     Previous ECG:  Unavailable  Interpretation:     Interpretation: normal    Rate:     ECG rate:  83    ECG rate assessment: normal    Rhythm:     Rhythm: sinus rhythm    Ectopy:     Ectopy: none    QRS:     QRS axis:  Normal    QRS intervals:  Normal  Conduction:     Conduction: normal    ST segments:     ST segments:  Non-specific    Elevation:  III  T waves:     T waves: normal    Comments:      qtc 434    CriticalCare Time  Performed by: Brina Seals DO  Authorized by: Brina Seals DO     Critical care provider statement:     Critical care time (minutes):  67    Critical care start time:  5/21/2021 10:45 AM    Critical care end time:  5/21/2021 12:00 PM    Critical care time was exclusive of:  Separately billable procedures and treating other patients and teaching time    Critical care was necessary to treat or prevent imminent or life-threatening deterioration of the following conditions:  CNS failure or compromise    Critical care was time spent personally by me on the following activities:  Blood draw for specimens, obtaining history from patient or surrogate, development of treatment plan with patient or surrogate, discussions with consultants, discussions with primary provider, evaluation of patient's response to treatment, examination of patient, ventilator management, re-evaluation of patient's condition, review of old charts, ordering and review of radiographic studies, ordering and review of laboratory studies and ordering and performing treatments and interventions    I assumed direction of critical care for this patient from another provider in my specialty: no    Intubation    Date/Time: 5/21/2021 12:10 PM  Performed by: Brina Seals DO  Authorized by: Brina Seals DO     Patient location:  ED  Consent:     Consent obtained:  Emergent situation  Pre-procedure details:     Patient status:  Unresponsive    Mallampati score:  2    Pretreatment medications:  Etomidate    Paralytics:  Succinylcholine  Indications:     Indications for intubation: airway protection    Procedure details:     Preoxygenation:  Bag valve mask    CPR in progress: no      Intubation method:  Oral    Oral intubation technique:  Glidescope    Laryngoscope blade: Mac 4    Tube size (mm):  8 0    Tube type:  Cuffed    Number of attempts:  1    Ventilation between attempts: no      Cricoid pressure: no      Tube visualized through cords: yes    Placement assessment:     ETT to lip:  24    ETT to teeth:  23    Tube secured with:  ETT ram    Breath sounds:  Equal and absent over the epigastrium    Placement verification: chest rise and CXR verification      CXR findings:  ETT in proper place  Post-procedure details:     Patient tolerance of procedure: Tolerated well, no immediate complications             ED Course  ED Course as of May 21 1212   Fri May 21, 2021   1111 Spoke with Dr Tawanna Dhaliwal- NSx- recommends 25g mannitol, keeping SBP < 140, and a level 1 priority transfer to [facility]  He recommends discussing with trauma ICU      1117 Spoke with Dr Aurora Casey- transfer accepted  Secondary to initial bradycardia that required external pacing and atropine, recommended SBP goal of 160 with concern for worsening bradycardia      1128 Called patient's significant other [phone] to attempt to give information about patient's condition, but unfortunately it goes straight to voicemail  Left a voicemail stating to please call back for information regarding Artis Mccrary        1129 Mechanical ventilation set to 18/500/6/100%                    Stroke Assessment     Row Name 05/21/21 1051             NIH Stroke Scale    Interval  Baseline      Level of Consciousness (1a )  3      LOC Questions (1b )  2      LOC Commands (1c )  2      Best Gaze (2 )  2      Visual (3 )  3      Facial Palsy (4 )  0      Motor Arm, Left (5a )  4      Motor Arm, Right (5b )  2      Motor Leg, Left (6a )  4      Motor Leg, Right (6b )  2      Limb Ataxia (7 )  2      Sensory (8 )  2      Best Language (9 )  3      Dysarthria (10 )  Intubated or other physical barrier      Extinction and Inattention (11 ) (Formerly Neglect)  2      Total  33                                  MDM  Number of Diagnoses or Management Options  Midline shift of brain due to hematoma [facility]):   Subarachnoid hemorrhage [facility]):   Subdural hemorrhage ([facility] [address] ):   Uncal herniation [facility]):   Diagnosis management comments: Assessment and plan:  81-year-old male presenting with unresponsiveness status post stumbling and falling on a walking path found to have massive intracranial/intracerebral hemorrhage with midline shift and uncal herniation  Grim prognosis  Patient requires transfer to critical care at [facility] and neurosurgery evaluation  Disposition  Final diagnoses:   Subdural hemorrhage ([facility] [address] )   Subarachnoid hemorrhage (H[facility])   Uncal herniation ([facility] [address] )   Midline shift of brain due to hematoma [facility])     Time reflects when diagnosis was documented in both MDM as applicable and the Disposition within this note     Time User Action Codes Description Comment    5/21/2021 11:33 AM Kaleta Moy Add [I62 00] Subdural hemorrhage ([facility] )     5/21/2021 11:33 AM Kaleta Moy Add [I60 9] Subarachnoid hemorrhage ([facility] )     5/21/2021 11:33 AM Kaleta Moy Add [G93 5] Uncal herniation ([facility] )     5/21/2021 11:33 AM Kaleta Moy Add [G93 89,  S06 2X0S] Midline shift of brain due to hematoma [facility])       ED Disposition     ED Disposition Condition Date/Time Comment    Transfer to Another Facility-In Network  Fri May 21, 2021 11:33 AM Danilo Bloom should be transferred out to [facility]          MD Documentation      Most Recent Value   Patient Condition  The patient has been stabilized such that within reasonable medical probability, no material deterioration of the patient condition or the condition of the unborn child(girma) is likely to result from the transfer   Reason for Transfer  Level of Care needed not available at this facility   Benefits of Transfer  Specialized equipment and/or services available at the receiving facility (Include comment)________________________   Risks of Transfer  Potential for delay in receiving treatment, Increased discomfort during transfer, Possible worsening of condition or death during transfer, Potential deterioration of medical condition, Loss of IV   Accepting Physician  Dr Geoffrey Farrell Name, [address] by (Company and Unit #)  [place]   Sending MD Dr Destiny Farfan   Provider Certification  General risk, such as traffic hazards, adverse weather conditions, rough terrain or turbulence, possible failure of equipment (including vehicle or aircraft), or consequences of actions of persons outside the control of the transport personnel      RN Documentation      [address] Name, [address]   [facility]   Report Given to  [employer] by [employer] and Unit #)  [place]      Follow-up Information    None         There are no discharge medications for this patient  No discharge procedures on file      PDMP Review     None          ED Provider  Electronically Signed by           Alondra Carter DO  05/21/21 1455

## 2021-05-21 NOTE — PROCEDURES
Arterial Line Insertion    Date/Time: 5/21/2021 4:00 PM  Performed by: Luis Enrique Campbell MD  Authorized by: Luis Enrique Campbell MD     Patient location:  Bedside and ICU  Other Assisting Provider: No    Consent:     Consent obtained:  Emergent situation  Universal protocol:     Patient identity confirmed:  Arm band  Indications:     Indications: hemodynamic monitoring, continuous blood pressure monitoring and frequent labs / infusion    Pre-procedure details:     Skin preparation:  Chlorhexidine    Preparation: Patient was prepped and draped in sterile fashion    Anesthesia (see MAR for exact dosages): Anesthesia method:  None  Procedure details:     Location / Tip of Catheter:  Radial    Laterality:  Left    Candido's test performed: yes      Candido's test abnormal: no      Placement technique:  Percutaneous and ultrasound guided    Ultrasound image availability:  Not saved    Sterile ultrasound techniques: Sterile gel and sterile probe covers were used      Number of attempts:  2    Successful placement: yes      Transducer: waveform confirmed    Post-procedure details:     Post-procedure:  Secured with tape, sterile dressing applied and sutured    CMS:  Unable to assess    Patient tolerance of procedure:   Tolerated well, no immediate complications

## 2021-05-21 NOTE — CASE MANAGEMENT
[facility] obtain [facility] sort of contact information for any sort of alternative contact  Both locations have nothing aside from the wife's telephone number which is already on file  PCP feels the pt's wife is retired and not currently working  More phone calls made to pt's wife cell phone but it immediately goes to voicemail   CM will continue to search

## 2021-05-21 NOTE — H&P
H&P Exam - Trauma   Vicki Montgomery 61 y o  male MRN: [id number]  Unit/Bed#: ICU 09 Encounter: [id number]    Assessment/Plan   Trauma Alert: Level A  Model of Arrival: Ambulance  Trauma Team: Attending Dr Jerald Bumpers  Consultants: Neurosurgery: Dr Mackenzie Paz 11:54, Time of Arrival: In trauma bay    Trauma Active Problems: Large acute and hyperacute left subdural hematoma, midbrain shifted to the right, 1 7cm midline shift    Trauma Plan: CTA, OR for craniectomy     Chief Complaint: Fall, subdural hematoma     History of Present Illness      HPI:  Vicki Montgomery is a 61 y o  male, on dual anti-platelet therapy, who presents, via EMS as a level A transfer, due to a fixed-dilated left pupil, large acute and hyper acute left subdural hematoma with midline shift  Per EMS, patient was walking his dog when he was witnessed to collapse  EMS was called and he was brought to outside hospital  Due to his unresponsiveness, he was intubated using etomidate and succinylcholine for RSI  He was subsequently transferred and hypertonics were administered  Per report, patient received DDAVP as reversal      Mechanism:Fall    Review of Systems   Unable to perform ROS: Mental status change     Historical Information   History is unobtainable from the patient due to AMS, intubation, inability to contact family  Efforts to obtain history included the following sources: family member, other medical personnel, obtained from other records    No past medical history on file  No past surgical history on file  Social History   Social History     Substance and Sexual Activity   Alcohol Use None     Social History     Substance and Sexual Activity   Drug Use Not on file     Social History     Tobacco Use   Smoking Status Unknown If Ever Smoked     E-Cigarette/Vaping     E-Cigarette/Vaping Substances       There is no immunization history on file for this patient    Last Tetanus: 12/17  Family History: Non-contributory      Meds/Allergies   current meds: Aspirin, plavix as seen on patient's other chart  No current facility-administered medications for this encounter  Not on File      PHYSICAL EXAM    PE limited by: Acuity of condition    Objective   Vitals:   First set: Temperature: (!) 95 7 °F (35 4 °C) (05/21/21 1225)  Pulse: 58 (05/21/21 1225)  Respirations: 16 (05/21/21 1225)  Blood Pressure: 164/80 (05/21/21 1225)    Primary Survey:   (A) Airway: Definitive airway in place, confirmed by manometry  (B) Breathing: Bilateral breath sounds  (C) Circulation: Pulses:   radial  2/4, DP tready   (D) Disabliity:  GCS Total:  6, Eye Opening:   None = 1, Motor Response: Withdraws to pain = 4 and Verbal Response:  None = 1  (E) Expose:  Completed    Secondary Survey: (Click on Physical Exam tab above)  Physical Exam  Vitals signs reviewed  Constitutional:       General: He is in acute distress  Appearance: He is toxic-appearing  Comments: Intubated and unresponsive   HENT:      Head: Normocephalic  Right Ear: External ear normal       Left Ear: External ear normal       Nose: Nose normal       Mouth/Throat:      Comments: Patient intubated  Eyes:      General:         Right eye: No discharge  Comments: Bilaterally fixed 7mm pupils that were non-reactive to light   Neck:      Musculoskeletal: Neck supple  Cardiovascular:      Rate and Rhythm: Regular rhythm  Comments: 2+ radial pulses, thready DP  Pulmonary:      Breath sounds: Normal breath sounds  No stridor  No wheezing, rhonchi or rales  Comments: Intubated  Abdominal:      Palpations: Abdomen is soft  Tenderness: There is no guarding or rebound  Musculoskeletal:      Right lower leg: No edema  Left lower leg: No edema  Comments: No C, T, L spine deformities or step offs   Skin:     General: Skin is warm and dry  Neurological:      Comments: GCS 6T   Bilaterally fixed/nonreactive 7mm pupils   Psychiatric: Comments: Unable to assess due to AMS         Invasive Devices     Peripheral Intravenous Line            Peripheral IV 05/21/21 Right Arm less than 1 day          Drain            NG/OG/Enteral Tube Orogastric 16 Fr Left mouth less than 1 day    Urethral Catheter Temperature probe less than 1 day          Airway            ETT  Hi-Lo; Cuffed;Oral 8 mm less than 1 day    ETT  Oral 8 mm less than 1 day                Lab Results: Results: I have personally reviewed pertinent reports      Imaging/EKG Studies: CT Scan Head: I have personally reviewed the pertinent images - CTA also performed    Code Status: No Order  Advance Directive and Living Will:      Power of :    POLST:

## 2021-05-21 NOTE — CASE MANAGEMENT
JOANNA spoke to pt's son Daniela Urban [phone], who is coming from [place] to the ED  Pt's wife's telephone is [phone], and she is currently at [address] in a conference room "waiting to talk to a doctor"  JOANNA instructed her to leave, come to the [facility] and meet with the team here to discuss her 's care

## 2021-05-21 NOTE — CASE MANAGEMENT
CM responded to trauma alert  Pt was brought to the ED via [facility] s/p tx from [address] after a fall while walking his dog  Pt was intubated at [address] and transferred   CM left a voicemail for pt's wife Blair Jenkins [phone] to inform her of the pt's arrival

## 2021-05-21 NOTE — ASSESSMENT & PLAN NOTE
2cm acute left SDH with 1 9cm left to right MLS  · S/p witnessed fall while walking his dog  · On DAPT, reversed with DDAVP  · GCS 5T on arrival to [facility] with fixed and dilated pupils  Slight RUE flexion to painful stimuli  · GCS 3T upon arrival to OR with only intact corneal reflexes    Imaging:  · CT head w/o, 5/21/21:  Large acute left hemispheric subdural hematoma with questionable subarachnoid hemorrhage  Significant mass effect with subfalcine and uncal herniation  Dilated temporal horn and atria of the right lateral ventricle  · CTA head and neck w/wo, 5/21/21: Medial displacement of peripheral left MCA vasculature  Elongated contrast enhancement along the margin of subdural hematoma and cortex in the left frontal lobe may indicate active bleed versus venous blush  Underlying vascular malformation is not excluded  No obvious aneurysm  Plan:  · Exam GCS 3T  · Continue frequent neurological checks  · OR cancelled 2/2 decline in patient exam - this is not a survivable injury  · Recommend SBP <160mmHg  · Hold all antiplatelet and anticoagulation medications  · Pain control per primary team  · Ongoing medical management per primary team/trauma  · No further neurosurgical intervention indicated at this juncture  · [address] discussion when family is contacted    Neurosurgery will sign off  Please call with additional questions or concerns

## 2021-05-21 NOTE — EMTALA/ACUTE CARE TRANSFER
[facility] EMERGENCY DEPARTMENT  [address] [place] [address]  Dept: [phone]      EMTALA TRANSFER CONSENT    NAME Renetta Welsh                                         1955                              MRN [id number]    I have been informed of my rights regarding examination, treatment, and transfer   by Dr Eugene Portillo DO    Benefits: Specialized equipment and/or services available at the receiving facility (Include comment)________________________    Risks: Potential for delay in receiving treatment, Increased discomfort during transfer, Possible worsening of condition or death during transfer, Potential deterioration of medical condition, Loss of IV      Consent for Transfer:  I acknowledge that my medical condition has been evaluated and explained to me by the emergency department physician or other qualified medical person and/or my attending physician, who has recommended that I be transferred to the service of  Accepting Physician: Dr Matute Counts at [address], [address] : [facility]  The above potential benefits of such transfer, the potential risks associated with such transfer, and the probable risks of not being transferred have been explained to me, and I fully understand them  The doctor has explained that, in my case, the benefits of transfer outweigh the risks  I agree to be transferred  I authorize the performance of emergency medical procedures and treatments upon me in both transit and upon arrival at the receiving facility  Additionally, I authorize the release of any and all medical records to the receiving facility and request they be transported with me, if possible  I understand that the safest mode of transportation during a medical emergency is an ambulance and that the Hospital advocates the use of this mode of transport   Risks of traveling to the receiving facility by car, including absence of medical control, life sustaining equipment, such as oxygen, and medical personnel has been explained to me and I fully understand them  (MARCI CORRECT BOX BELOW)  [  ]  I consent to the stated transfer and to be transported by ambulance/helicopter  [  ]  I consent to the stated transfer, but refuse transportation by ambulance and accept full responsibility for my transportation by car  I understand the risks of non-ambulance transfers and I exonerate the Hospital and its staff from any deterioration in my condition that results from this refusal     X___________________________________________    DATE  21  TIME________  Signature of patient or legally responsible individual signing on patient behalf           RELATIONSHIP TO PATIENT_________________________          Provider Certification    NAME Stefanie Hartmann                                         1955                              MRN [id number]    A medical screening exam was performed on the above named patient  Based on the examination:    Condition Necessitating Transfer The primary encounter diagnosis was Subdural hemorrhage ([facility] [address] )  Diagnoses of Subarachnoid hemorrhage ([facility] [address] ), Uncal herniation ([facility] [address] ), and Midline shift of brain due to hematoma [facility]) were also pertinent to this visit      Patient Condition: The patient has been stabilized such that within reasonable medical probability, no material deterioration of the patient condition or the condition of the unborn child(girma) is likely to result from the transfer    Reason for Transfer: Level of Care needed not available at this facility    Transfer Requirements: Facility [facility]   · Space available and qualified personnel available for treatment as acknowledged by    · Agreed to accept transfer and to provide appropriate medical treatment as acknowledged by       Dr Delmer Amezquita  · Appropriate medical records of the examination and treatment of the patient are provided at the time of transfer   [facility] COMPLETED _______  · Transfer will be performed by qualified personnel from    and appropriate transfer equipment as required, including the use of necessary and appropriate life support measures  Provider Certification: I have examined the patient and explained the following risks and benefits of being transferred/refusing transfer to the patient/family:  General risk, such as traffic hazards, adverse weather conditions, rough terrain or turbulence, possible failure of equipment (including vehicle or aircraft), or consequences of actions of persons outside the control of the transport personnel      Based on these reasonable risks and benefits to the patient and/or the unborn child(girma), and based upon the information available at the time of the patients examination, I certify that the medical benefits reasonably to be expected from the provision of appropriate medical treatments at another medical facility outweigh the increasing risks, if any, to the individuals medical condition, and in the case of labor to the unborn child, from effecting the transfer      X____________________________________________ DATE 05/21/21        TIME_______      ORIGINAL - SEND TO MEDICAL RECORDS   COPY - SEND WITH PATIENT DURING TRANSFER

## 2021-05-21 NOTE — CASE MANAGEMENT
JOANNA spoke to pt's niece Rosa [phone]  She will reach out to the pt's dtr, who lives with them   According to Rosa, the pt has Alzheimer's

## 2021-05-21 NOTE — STROKE DOCUMENTATION
[place] ED Level A to the [facility]  36   Dr Giovanni Walters accepting  Report to be called at [phone]

## 2021-05-22 PROBLEM — F17.200 SMOKING: Status: ACTIVE | Noted: 2021-01-01

## 2021-05-22 NOTE — PROCEDURES
Central Line Insertion    Date/Time: 5/22/2021 4:17 AM  Performed by: Vernie Simmonds, MD  Authorized by: Vernie Simmonds, MD     Patient location:  Bedside and ICU  Other Assisting Provider: No    Consent:     Consent obtained:  Verbal    Consent given by:  Spouse    Risks discussed:  Arterial puncture, incorrect placement, pneumothorax, infection and bleeding  Universal protocol:     Patient identity confirmed:  Arm band and hospital-assigned identification number  Pre-procedure details:     Hand hygiene: Hand hygiene performed prior to insertion      Sterile barrier technique: All elements of maximal sterile technique followed      Skin preparation:  ChloraPrep    Skin preparation agent: Skin preparation agent completely dried prior to procedure    Indications:     Central line indications: medications requiring central line    Anesthesia (see MAR for exact dosages): Anesthesia method:  Local infiltration    Local anesthetic:  Lidocaine 1% w/o epi  Procedure details:     Location:  Left subclavian    Vessel type: vein      Laterality:  Left    Approach: percutaneous technique used      Patient position:  Flat    Catheter type:  Triple lumen 20cm    Catheter size:  7 Fr    Landmarks identified: yes      Number of attempts:  1    Successful placement: yes    Post-procedure details:     Post-procedure:  Dressing applied and line sutured    Assessment:  Blood return through all ports    Post-procedure complications: none      Patient tolerance of procedure:   Tolerated well, no immediate complications

## 2021-05-22 NOTE — PROGRESS NOTES
Pt  Was declared brain dead at 0255 after Caloric test and Apnea test  Anticipating organ donation, GOL at bedside

## 2021-05-22 NOTE — PLAN OF CARE
Problem: Prexisting or High Potential for Compromised Skin Integrity  Goal: Skin integrity is maintained or improved  Description: INTERVENTIONS:  - Identify patients at risk for skin breakdown  - Assess and monitor skin integrity  - Assess and monitor nutrition and hydration status  - Monitor labs   - Assess for incontinence   - Turn and reposition patient  - Assist with mobility/ambulation  - Relieve pressure over bony prominences  - Avoid friction and shearing  - Provide appropriate hygiene as needed including keeping skin clean and dry  - Evaluate need for skin moisturizer/barrier cream  - Collaborate with interdisciplinary team   - Patient/family teaching  - Consider wound care consult   Outcome: Progressing     Problem: Potential for Falls  Goal: Patient will remain free of falls  Description: INTERVENTIONS:  - Assess patient frequently for physical needs  -  Identify cognitive and physical deficits and behaviors that affect risk of falls    -  [place] fall precautions as indicated by assessment   - Educate patient/family on patient safety including physical limitations  - Instruct patient to call for assistance with activity based on assessment  - Modify environment to reduce risk of injury  - Consider OT/PT consult to assist with strengthening/mobility  Outcome: Progressing     Problem: PAIN - ADULT  Goal: Verbalizes/displays adequate comfort level or baseline comfort level  Description: Interventions:  - Encourage patient to monitor pain and request assistance  - Assess pain using appropriate pain scale  - Administer analgesics based on type and severity of pain and evaluate response  - Implement non-pharmacological measures as appropriate and evaluate response  - Consider cultural and social influences on pain and pain management  - Notify physician/advanced practitioner if interventions unsuccessful or patient reports new pain  Outcome: Progressing     Problem: INFECTION - ADULT  Goal: Absence or prevention of progression during hospitalization  Description: INTERVENTIONS:  - Assess and monitor for signs and symptoms of infection  - Monitor lab/diagnostic results  - Monitor all insertion sites, i e  indwelling lines, tubes, and drains  - Monitor endotracheal if appropriate and nasal secretions for changes in amount and color  - [place] appropriate cooling/warming therapies per order  - Administer medications as ordered  - Instruct and encourage patient and family to use good hand hygiene technique  - Identify and instruct in appropriate isolation precautions for identified infection/condition  Outcome: Progressing  Goal: Absence of fever/infection during neutropenic period  Description: INTERVENTIONS:  - Monitor WBC    Outcome: Progressing     Problem: SAFETY ADULT  Goal: Patient will remain free of falls  Description: INTERVENTIONS:  - Assess patient frequently for physical needs  -  Identify cognitive and physical deficits and behaviors that affect risk of falls    -  [place] fall precautions as indicated by assessment   - Educate patient/family on patient safety including physical limitations  - Instruct patient to call for assistance with activity based on assessment  - Modify environment to reduce risk of injury  - Consider OT/PT consult to assist with strengthening/mobility  Outcome: Progressing  Goal: Maintain or return to baseline ADL function  Description: INTERVENTIONS:  -  Assess patient's ability to carry out ADLs; assess patient's baseline for ADL function and identify physical deficits which impact ability to perform ADLs (bathing, care of mouth/teeth, toileting, grooming, dressing, etc )  - Assess/evaluate cause of self-care deficits   - Assess range of motion  - Assess patient's mobility; develop plan if impaired  - Assess patient's need for assistive devices and provide as appropriate  - Encourage maximum independence but intervene and supervise when necessary  - Involve family in performance of ADLs  - Assess for home care needs following discharge   - Consider OT consult to assist with ADL evaluation and planning for discharge  - Provide patient education as appropriate  Outcome: Progressing  Goal: Maintain or return mobility status to optimal level  Description: INTERVENTIONS:  - Assess patient's baseline mobility status (ambulation, transfers, stairs, etc )    - Identify cognitive and physical deficits and behaviors that affect mobility  - Identify mobility aids required to assist with transfers and/or ambulation (gait belt, sit-to-stand, lift, walker, cane, etc )  - [place] fall precautions as indicated by assessment  - Record patient progress and toleration of activity level on Mobility SBAR; progress patient to next Phase/Stage  - Instruct patient to call for assistance with activity based on assessment  - Consider rehabilitation consult to assist with strengthening/weightbearing, etc   Outcome: Progressing     Problem: DISCHARGE PLANNING  Goal: Discharge to home or other facility with appropriate resources  Description: INTERVENTIONS:  - Identify barriers to discharge w/patient and caregiver  - Arrange for needed discharge resources and transportation as appropriate  - Identify discharge learning needs (meds, wound care, etc )  - Arrange for interpretive services to assist at discharge as needed  - Refer to Case Management Department for coordinating discharge planning if the patient needs post-hospital services based on physician/advanced practitioner order or complex needs related to functional status, cognitive ability, or social support system  Outcome: Progressing     Problem: Knowledge Deficit  Goal: Patient/family/caregiver demonstrates understanding of disease process, treatment plan, medications, and discharge instructions  Description: Complete learning assessment and assess knowledge base    Interventions:  - Provide teaching at level of understanding  - Provide teaching via preferred learning methods  Outcome: Progressing

## 2021-05-22 NOTE — DEATH NOTE
INPATIENT DEATH NOTE  Caridad White 61 y o  male MRN: [id number]  Unit/Bed#: ICU 09 Encounter: [id number]      Brain Death [facility] 2021  Protocol followed with single provider and apnea confirmation  PHYSICAL EXAM:  Unresponsive to noxious stimuli, Spontaneous respirations absent, Pupillary light reflex absent, Corneal blink reflex absent and all brain stem reflexes absent  Medical Examiner notification criteria:  Patient  within 24 hours of arrival to hospital and Any type of trauma   Medical Examiner's office notified?:  Yes   Medical Examiner accepted case?:  No  Name of Medical Examiner:      Autopsy Options:  Gift of Life - Donation intended    Physician/Resident responsible for completing Discharge Summary:  Everett Susan [facility]    Family at bedside  [facility] at bedside      Kathy Wisdom MD FACS  Trauma, Surgical Critical Care, Acute Care Surgery

## 2021-05-22 NOTE — PROGRESS NOTES
Pastoral Care Progress Note    2021  Patient: Vin Voss : 1955  Admission Date & Time: 2021 1221  MRN: [id number] [facility]: [id number]                     Chaplaincy Interventions Utilized:   Empowerment: Encouraged self-care, Facilitated group experience, Provided anticipatory guidance and Provided chaplaincy education    Exploration: Explored emotional needs & resources and Identified, evaluated & reinforced appropriate coping strategies    Collaboration: Consulted with interdisciplinary team    Relationship Building: Cultivated a relationship of care and support and Hospitality    Chaplaincy Outcomes Achieved: Identified meaningful connections, Relational resources utilized and Verbally processed emotions    Spiritual Coping Strategies Utilized:   Connectedness  Nurse referral  Freeman Baum spoke with spouse, son and daughter   provided support and availability for spiritual and emotional support   provided hospitality  Family expressed gratitude for the visit

## 2021-05-22 NOTE — DISCHARGE SUMMARY
Discharge Summary - Radha Reyez 61 y o  male MRN: [id number]    Unit/Bed#: ICU 09 Encounter: [id number] PCP: MINNIE Jiang    Admission Date:   Admission Orders (From admission, onward)     Ordered        05/21/21 1449  Inpatient Admission  Once                     Admitting Diagnosis: Intracranial hemorrhage ([address] ) [I62 9]    HPI: Per Dr Luis Fernando Cano on admission "Radha Reyez is a 61 y o  male, on dual anti-platelet therapy, who presents, via EMS as a level A transfer, due to a fixed-dilated left pupil, large acute and hyper acute left subdural hematoma with midline shift  Per EMS, patient was walking his dog when he was witnessed to collapse  EMS was called and he was brought to outside hospital  Due to his unresponsiveness, he was intubated using etomidate and succinylcholine for RSI  He was subsequently transferred and hypertonics were administered  Per report, patient received DDAVP as reversal "    Procedures Performed:   Orders Placed This Encounter   Procedures    Central Line       Summary of Hospital Course: Patient was received in the trauma bay already intubated and maintained on mechanical ventilation  He had received DDAVP and mannitol enroute  His pupils were dilated and fixed  He had a CTA and was taken directly to the OR  In the OR, Neurosurgery determined injury to be non-survivable and the case was aborted  Patient was brought up to the ICU  Family was notified after extensive search to find a contact and patient's wife, son and daughter came in to the hospital and were at bedside  Isabella Hartmann was declared brain dead at 02:55 by Dr Mitch Calabrese  Gift of Life at bedside and proceeding with discussions with the family pertaining to possible organ donation  Significant Findings, Care, Treatment and Services Provided: Cta Head And Neck With And Without Contrast    Result Date: 5/21/2021  Impression: 1    Grossly unchanged large left hemispheric subdural hematoma with significant left-to-right midline shift and uncal herniation  Grossly stable enlarged right atrium and temporal horn  2   Suggested subarachnoid hemorrhage in the left greater than right sylvian fissures and suprasellar cistern, similar to prior exam   New blood within 4th ventricle and right quadrigeminal cistern  3   Inferior left frontal lobe hypodensity suggesting developing ischemia  4   Medial displacement of peripheral left MCA vasculature  Elongated contrast enhancement along the margin of subdural hematoma and cortex in the left frontal lobe may indicate active bleed versus venous blush  Underlying vascular malformation is not excluded  No obvious aneurysm  5   Right-sided deviation of patent anterior cerebral arteries  6   No hemodynamically significant stenosis of cervical carotid and vertebral arteries  7   Pansinusitis  I personally discussed this study with Dr Philip Centeno on 2021 at 12:50 PM  Workstation performed: [id number]     X-ray Chest 1 View Portable    Result Date: 2021  Impression: Typical appearance of recently placed endotracheal tube and nasogastric tube No acute cardiopulmonary disease  Workstation performed: [id number]     Xr Trauma Multiple    Result Date: 2021  Impression: No acute cardiopulmonary disease within limitations of supine imaging  Workstation performed: [id number]     Ct Stroke Alert Brain    Result Date: 2021  Impression: Large acute left hemispheric subdural hematoma with questionable subarachnoid hemorrhage  Significant mass effect with subfalcine and uncal herniation  Dilated temporal horn and atria of the right lateral ventricle    Findings were directly discussed with Dr Irma Vernon on 2021 11:15 AM  Also reviewed with Chay Yoder at 11:28 AM  Workstation performed: [id number]       Complications: none    Disposition:      Final Diagnosis: subdural hemorrhage         Condition at Time of Death:          Death Note:    INPATIENT DEATH NOTE  Yoselin Toribio 61 y o  male MRN: [id number]  Unit/Bed#: ICU 09 Encounter: [id number]      Brain Death [facility] 2021  Protocol followed with single provider and apnea confirmation  PHYSICAL EXAM:  Unresponsive to noxious stimuli, Spontaneous respirations absent, Pupillary light reflex absent, Corneal blink reflex absent and all brain stem reflexes absent  Medical Examiner notification criteria:  Patient  within 24 hours of arrival to hospital and Any type of trauma   Medical Examiner's office notified?:  Yes   Medical Examiner accepted case?:  No  Name of Medical Examiner:      Autopsy Options:  Gift of Life - Donation intended    Physician/Resident responsible for completing Discharge Summary:  [facility]    Family at bedside  GOL at bedside      Pedro Hwang MD FACS  Trauma, Surgical Critical Care, Acute Care Surgery

## 2021-05-22 NOTE — RESPIRATORY THERAPY NOTE
resp care      05/22/21 1004   Respiratory Assessment   Resp Comments recruitment manuever 30 peep for 30 sec done, peep then decreased to 20 for 5 sec and patient not tolerating and then peep dfecreased to 10 per Azul Clark from GOL

## 2021-05-22 NOTE — RESPIRATORY THERAPY NOTE
RT Ventilator Management Note  Jarek Mock 61 y o  male MRN: [id number]  Unit/Bed#: ICU 09 Encounter: [id number]      Daily Screen     No data found in the last 10 encounters  Physical Exam:   Assessment Type: (P) Assess only  General Appearance: (P) Unresponsive  Respiratory Pattern: (P) Assisted  Chest Assessment: (P) Chest expansion symmetrical  Bilateral Breath Sounds: (P) Clear  Cough: (P) None  Suction: (P) ET Tube  O2 Device: (P) Ventilator      Resp Comments: (P) Pt  remains on CMV/VC mode  Will continue to monitor pt  per protocol

## 2021-05-22 NOTE — RESPIRATORY THERAPY NOTE
resp care      05/22/21 1015   Respiratory Assessment   Resp Comments peep decreased to 5 and fio2 increased to 100% per GOL Rosetta   Additional Assessments   Pulse 96   Respirations 13   SpO2 (!) 88 %

## 2021-05-22 NOTE — PROGRESS NOTES
[id number]  Pt  With very slight corneal reflex, strong cough, no response to pain in all four extremities  Pupils fixed and dilated as documented on previous shift  Temp rising via shultz temp  Probe  Heart rate bradycardic, then tachcardic  Breathing over A/C rate of14 on vent  Sats good on 40%-500-A/C 14 -6 PEEP  Initially pt  Very hypertensive, requiring several doses of Hydralazine  OGT draining coffee ground drainage  At 2200 neuro exam, pt  Had no corneal reflex and was exhibiting some decerebrate posturing of upper extremities, spontaneously  2400- Pt  Became hypotensive 60's/40's requiring 2 liter boluses of Isolyte and ultimately was started on Luc in order to obtain MAP>65  Luc drip was unsuccessful in raising BP and in addition, Levo was added while waiting for Vaso drip  No cough noted during suctioning, pt  Not overbreathing vent  Caloric test performed by Dr Arabella Parsons, followed by Apnea test by resp  Therapist and Dr Jourdan Whitmore  CO2 71 on ABG after approx  6 min  Pt  Placed back on vent  Pt  Declared brain dead at [phone]  [facility] assumed care of pt  After discussion with family, as pt  Was designated organ donor on 's license and family wanted to honor his wishes   notified due to death of pt  Within 24 hrs  Of admission  She arrived at bedside to obtain photographs and evaluate for possible autopsy  's office will sign death certificate  Organ donation will proceed

## 2021-05-22 NOTE — RESPIRATORY THERAPY NOTE
RT Ventilator Management Note  Michael Adkins 61 y o  male MRN: [id number]  Unit/Bed#: ICU 09 Encounter: [id number]      Daily Screen       5/22/2021  0741             Patient safety screen outcome[de-identified]  Failed    Not Ready for Weaning due to[de-identified]  Underline problem not resolved            Physical Exam:   Assessment Type: (P) Assess only  General Appearance: (P) Unresponsive  Respiratory Pattern: (P) Assisted  Chest Assessment: (P) Chest expansion symmetrical  Bilateral Breath Sounds: (P) Clear, Diminished  Cough: None  Suction: (P) ET Tube  O2 Device: Ventilator      Resp Comments: (P) Pt  currently [facility] pt  No changes at this time  Will adjust settings as needed

## 2021-05-22 NOTE — RESPIRATORY THERAPY NOTE
resp care      05/22/21 1000   Respiratory Assessment   Resp Comments VT increased to 600 per [facility] Heater from [facility]   Additional Assessments   Pulse 94   Respirations 14   SpO2 93 %

## 2021-05-22 NOTE — RESPIRATORY THERAPY NOTE
RT Ventilator Management Note  Natalee Fong 61 y o  male MRN: [id number]  Unit/Bed#: ICU 09 Encounter: [id number]      Daily Screen       5/22/2021  0741 5/22/2021  1045          Patient safety screen outcome[de-identified]  Failed  Failed      Not Ready for Weaning due to[de-identified]  Underline problem not resolved  --              Physical Exam:   Assessment Type: Assess only  General Appearance: Unresponsive  Respiratory Pattern: Assisted  Chest Assessment: Chest expansion symmetrical  Bilateral Breath Sounds: Clear, Diminished  Cough: None  Suction: ET Tube  O2 Device: Ventilator      Resp Comments: fio2 decreased back to 40% at this time , will continue to monitor

## 2021-05-22 NOTE — PROGRESS NOTES
Critical Care Note:    Seen and examined serially from 1900 until 60 [phone] when time of death declared  In this window, hemodynamic changes support progression and completion of brain herniation  Supportive measures throughout this time to ensure gas exchange,optimize perfusion and prevent dysrhythmia  Examination also progresses to complete absence of any brain stem reflexes or ability to trigger ventilatory drive  Formal protocol followed and brain death declared concurrently with completion of apnea test 0255  Multiple family members, including spouse, son, daughter present throughout for explanation and  providing clear direction to proceed with organ donation  [facility] at bedside, assuming care post ROYAL Diaz MD FACS  Trauma, Surgical Critical Care, Acute Care Surgery     Critical Care Time/direct bedside care:  78 minutes exclusive of procedures/teaching/etc

## 2021-05-23 ENCOUNTER — ANESTHESIA (INPATIENT)
Dept: PERIOP | Facility: HOSPITAL | Age: 66
DRG: 064 | End: 2021-05-23
Payer: COMMERCIAL

## 2021-05-23 VITALS
HEART RATE: 120 BPM | DIASTOLIC BLOOD PRESSURE: 78 MMHG | BODY MASS INDEX: 31.08 KG/M2 | HEIGHT: 75 IN | WEIGHT: 250 LBS | TEMPERATURE: 99 F | OXYGEN SATURATION: 93 % | RESPIRATION RATE: 16 BRPM | SYSTOLIC BLOOD PRESSURE: 123 MMHG

## 2021-05-23 LAB
ALBUMIN SERPL BCP-MCNC: 3 G/DL (ref 3.5–5)
ALP SERPL-CCNC: 109 U/L (ref 46–116)
ALT SERPL W P-5'-P-CCNC: 31 U/L (ref 12–78)
AMYLASE SERPL-CCNC: 28 IU/L (ref 25–115)
ANION GAP SERPL CALCULATED.3IONS-SCNC: 6 MMOL/L (ref 4–13)
AST SERPL W P-5'-P-CCNC: 66 U/L (ref 5–45)
BACTERIA UR QL AUTO: ABNORMAL /HPF
BASOPHILS # BLD MANUAL: 0 THOUSAND/UL (ref 0–0.1)
BASOPHILS NFR MAR MANUAL: 0 % (ref 0–1)
BILIRUB SERPL-MCNC: 0.74 MG/DL (ref 0.2–1)
BILIRUB UR QL STRIP: NEGATIVE
BUN SERPL-MCNC: 12 MG/DL (ref 5–25)
BURR CELLS BLD QL SMEAR: PRESENT
CALCIUM ALBUM COR SERPL-MCNC: 10.1 MG/DL (ref 8.3–10.1)
CALCIUM SERPL-MCNC: 9.3 MG/DL (ref 8.3–10.1)
CHLORIDE SERPL-SCNC: 107 MMOL/L (ref 100–108)
CK MB SERPL-MCNC: 37.5 NG/ML (ref 0–5)
CK MB SERPL-MCNC: 9.9 % (ref 0–2.5)
CK SERPL-CCNC: 377 U/L (ref 39–308)
CLARITY UR: CLEAR
CO2 SERPL-SCNC: 24 MMOL/L (ref 21–32)
COLOR UR: ABNORMAL
CREAT SERPL-MCNC: 1.12 MG/DL (ref 0.6–1.3)
EOSINOPHIL # BLD MANUAL: 0 THOUSAND/UL (ref 0–0.4)
EOSINOPHIL NFR BLD MANUAL: 0 % (ref 0–6)
ERYTHROCYTE [DISTWIDTH] IN BLOOD BY AUTOMATED COUNT: 14.4 % (ref 11.6–15.1)
GFR SERPL CREATININE-BSD FRML MDRD: 69 ML/MIN/1.73SQ M
GGT SERPL-CCNC: 59 U/L (ref 5–85)
GLUCOSE SERPL-MCNC: 198 MG/DL (ref 65–140)
GLUCOSE UR STRIP-MCNC: ABNORMAL MG/DL
HCT VFR BLD AUTO: 51.3 % (ref 36.5–49.3)
HGB BLD-MCNC: 16.8 G/DL (ref 12–17)
HGB UR QL STRIP.AUTO: ABNORMAL
HYALINE CASTS #/AREA URNS LPF: ABNORMAL /LPF
KETONES UR STRIP-MCNC: ABNORMAL MG/DL
LDH SERPL-CCNC: 257 U/L (ref 81–234)
LEUKOCYTE ESTERASE UR QL STRIP: NEGATIVE
LIPASE SERPL-CCNC: 30 U/L (ref 73–393)
LYMPHOCYTES # BLD AUTO: 0 % (ref 14–44)
LYMPHOCYTES # BLD AUTO: 0 THOUSAND/UL (ref 0.6–4.47)
MAGNESIUM SERPL-MCNC: 2.2 MG/DL (ref 1.6–2.6)
MCH RBC QN AUTO: 29.6 PG (ref 26.8–34.3)
MCHC RBC AUTO-ENTMCNC: 32.7 G/DL (ref 31.4–37.4)
MCV RBC AUTO: 90 FL (ref 82–98)
MONOCYTES # BLD AUTO: 0.42 THOUSAND/UL (ref 0–1.22)
MONOCYTES NFR BLD: 2 % (ref 4–12)
NEUTROPHILS # BLD MANUAL: 19.94 THOUSAND/UL (ref 1.85–7.62)
NEUTS SEG NFR BLD AUTO: 96 % (ref 43–75)
NITRITE UR QL STRIP: NEGATIVE
NON-SQ EPI CELLS URNS QL MICRO: ABNORMAL /HPF
NRBC BLD AUTO-RTO: 0 /100 WBCS
PH UR STRIP.AUTO: 5.5 [PH]
PHOSPHATE SERPL-MCNC: 1.2 MG/DL (ref 2.3–4.1)
PLATELET # BLD AUTO: 238 THOUSANDS/UL (ref 149–390)
PLATELET BLD QL SMEAR: ADEQUATE
PMV BLD AUTO: 9.7 FL (ref 8.9–12.7)
POIKILOCYTOSIS BLD QL SMEAR: PRESENT
POTASSIUM SERPL-SCNC: 3.7 MMOL/L (ref 3.5–5.3)
PROT SERPL-MCNC: 7.1 G/DL (ref 6.4–8.2)
PROT UR STRIP-MCNC: ABNORMAL MG/DL
RBC # BLD AUTO: 5.68 MILLION/UL (ref 3.88–5.62)
RBC #/AREA URNS AUTO: ABNORMAL /HPF
RBC MORPH BLD: PRESENT
SODIUM SERPL-SCNC: 137 MMOL/L (ref 136–145)
SP GR UR STRIP.AUTO: 1.02 (ref 1–1.03)
TROPONIN I SERPL-MCNC: 4.71 NG/ML
UROBILINOGEN UR QL STRIP.AUTO: 1 E.U./DL
VARIANT LYMPHS # BLD AUTO: 2 %
WBC # BLD AUTO: 20.77 THOUSAND/UL (ref 4.31–10.16)
WBC #/AREA URNS AUTO: ABNORMAL /HPF

## 2021-05-23 PROCEDURE — 94760 N-INVAS EAR/PLS OXIMETRY 1: CPT

## 2021-05-23 PROCEDURE — 94003 VENT MGMT INPAT SUBQ DAY: CPT

## 2021-05-23 RX ORDER — ROCURONIUM BROMIDE 10 MG/ML
INJECTION, SOLUTION INTRAVENOUS AS NEEDED
Status: DISCONTINUED | OUTPATIENT
Start: 2021-05-23 | End: 2021-05-23

## 2021-05-23 RX ORDER — HEPARIN SODIUM 1000 [USP'U]/ML
INJECTION, SOLUTION INTRAVENOUS; SUBCUTANEOUS AS NEEDED
Status: DISCONTINUED | OUTPATIENT
Start: 2021-05-23 | End: 2021-05-23

## 2021-05-23 RX ORDER — SODIUM CHLORIDE, SODIUM LACTATE, POTASSIUM CHLORIDE, CALCIUM CHLORIDE 600; 310; 30; 20 MG/100ML; MG/100ML; MG/100ML; MG/100ML
INJECTION, SOLUTION INTRAVENOUS CONTINUOUS PRN
Status: DISCONTINUED | OUTPATIENT
Start: 2021-05-23 | End: 2021-05-23

## 2021-05-23 RX ORDER — SODIUM CHLORIDE 9 MG/ML
INJECTION, SOLUTION INTRAVENOUS CONTINUOUS PRN
Status: DISCONTINUED | OUTPATIENT
Start: 2021-05-23 | End: 2021-05-23

## 2021-05-23 RX ORDER — MANNITOL 250 MG/ML
INJECTION, SOLUTION INTRAVENOUS AS NEEDED
Status: DISCONTINUED | OUTPATIENT
Start: 2021-05-23 | End: 2021-05-23

## 2021-05-23 RX ADMIN — PHENYLEPHRINE HYDROCHLORIDE 100 MCG: 10 INJECTION INTRAVENOUS at 05:22

## 2021-05-23 RX ADMIN — PHENYLEPHRINE HYDROCHLORIDE 100 MCG: 10 INJECTION INTRAVENOUS at 05:14

## 2021-05-23 RX ADMIN — PHENYLEPHRINE HYDROCHLORIDE 100 MCG: 10 INJECTION INTRAVENOUS at 05:27

## 2021-05-23 RX ADMIN — SODIUM CHLORIDE, SODIUM LACTATE, POTASSIUM CHLORIDE, AND CALCIUM CHLORIDE: .6; .31; .03; .02 INJECTION, SOLUTION INTRAVENOUS at 04:23

## 2021-05-23 RX ADMIN — PHENYLEPHRINE HYDROCHLORIDE 100 MCG: 10 INJECTION INTRAVENOUS at 05:26

## 2021-05-23 RX ADMIN — PHENYLEPHRINE HYDROCHLORIDE 100 MCG: 10 INJECTION INTRAVENOUS at 05:32

## 2021-05-23 RX ADMIN — SODIUM CHLORIDE, SODIUM LACTATE, POTASSIUM CHLORIDE, AND CALCIUM CHLORIDE: .6; .31; .03; .02 INJECTION, SOLUTION INTRAVENOUS at 05:07

## 2021-05-23 RX ADMIN — ROCURONIUM BROMIDE 50 MG: 50 INJECTION, SOLUTION INTRAVENOUS at 04:30

## 2021-05-23 RX ADMIN — PHENYLEPHRINE HYDROCHLORIDE 100 MCG: 10 INJECTION INTRAVENOUS at 05:23

## 2021-05-23 RX ADMIN — PHENYLEPHRINE HYDROCHLORIDE 100 MCG: 10 INJECTION INTRAVENOUS at 05:12

## 2021-05-23 RX ADMIN — PHENYLEPHRINE HYDROCHLORIDE 100 MCG: 10 INJECTION INTRAVENOUS at 05:18

## 2021-05-23 RX ADMIN — PHENYLEPHRINE HYDROCHLORIDE 100 MCG: 10 INJECTION INTRAVENOUS at 05:42

## 2021-05-23 RX ADMIN — PHENYLEPHRINE HYDROCHLORIDE 100 MCG: 10 INJECTION INTRAVENOUS at 05:25

## 2021-05-23 RX ADMIN — MANNITOL 12.5 G: 12.5 INJECTION, SOLUTION INTRAVENOUS at 04:44

## 2021-05-23 RX ADMIN — SODIUM CHLORIDE, SODIUM LACTATE, POTASSIUM CHLORIDE, AND CALCIUM CHLORIDE: .6; .31; .03; .02 INJECTION, SOLUTION INTRAVENOUS at 05:47

## 2021-05-23 RX ADMIN — PHENYLEPHRINE HYDROCHLORIDE 100 MCG: 10 INJECTION INTRAVENOUS at 05:46

## 2021-05-23 RX ADMIN — INSULIN LISPRO 2 UNITS: 100 INJECTION, SOLUTION INTRAVENOUS; SUBCUTANEOUS at 00:13

## 2021-05-23 RX ADMIN — PHENYLEPHRINE HYDROCHLORIDE 100 MCG: 10 INJECTION INTRAVENOUS at 05:36

## 2021-05-23 RX ADMIN — PHENYLEPHRINE HYDROCHLORIDE 100 MCG: 10 INJECTION INTRAVENOUS at 05:11

## 2021-05-23 RX ADMIN — MANNITOL 12.5 G: 12.5 INJECTION, SOLUTION INTRAVENOUS at 04:48

## 2021-05-23 RX ADMIN — PHENYLEPHRINE HYDROCHLORIDE 100 MCG: 10 INJECTION INTRAVENOUS at 05:21

## 2021-05-23 RX ADMIN — PHENYLEPHRINE HYDROCHLORIDE 100 MCG: 10 INJECTION INTRAVENOUS at 05:19

## 2021-05-23 RX ADMIN — HEPARIN SODIUM 30000 UNITS: 1000 INJECTION INTRAVENOUS; SUBCUTANEOUS at 05:45

## 2021-05-23 RX ADMIN — PHENYLEPHRINE HYDROCHLORIDE 100 MCG: 10 INJECTION INTRAVENOUS at 05:38

## 2021-05-23 RX ADMIN — PHENYLEPHRINE HYDROCHLORIDE 20 MCG/MIN: 10 INJECTION INTRAVENOUS at 05:37

## 2021-05-23 RX ADMIN — PHENYLEPHRINE HYDROCHLORIDE 100 MCG: 10 INJECTION INTRAVENOUS at 05:24

## 2021-05-23 RX ADMIN — ROCURONIUM BROMIDE 50 MG: 50 INJECTION, SOLUTION INTRAVENOUS at 05:18

## 2021-05-23 RX ADMIN — SODIUM CHLORIDE: 9 INJECTION, SOLUTION INTRAVENOUS at 05:12

## 2021-05-23 RX ADMIN — PHENYLEPHRINE HYDROCHLORIDE 100 MCG: 10 INJECTION INTRAVENOUS at 05:45

## 2021-05-23 RX ADMIN — PHENYLEPHRINE HYDROCHLORIDE 100 MCG: 10 INJECTION INTRAVENOUS at 05:44

## 2021-05-23 RX ADMIN — PHENYLEPHRINE HYDROCHLORIDE 100 MCG: 10 INJECTION INTRAVENOUS at 05:40

## 2021-05-23 RX ADMIN — PHENYLEPHRINE HYDROCHLORIDE 100 MCG: 10 INJECTION INTRAVENOUS at 05:17

## 2021-05-23 RX ADMIN — MANNITOL 12.5 G: 12.5 INJECTION, SOLUTION INTRAVENOUS at 04:47

## 2021-05-23 RX ADMIN — PHENYLEPHRINE HYDROCHLORIDE 100 MCG: 10 INJECTION INTRAVENOUS at 05:09

## 2021-05-23 RX ADMIN — MANNITOL 12.5 G: 12.5 INJECTION, SOLUTION INTRAVENOUS at 04:53

## 2021-05-23 RX ADMIN — NICARDIPINE HYDROCHLORIDE 3 MG/HR: 2.5 INJECTION, SOLUTION INTRAVENOUS at 01:37

## 2021-05-23 NOTE — ANESTHESIA PREPROCEDURE EVALUATION
Procedure:  RECOVERY ORGAN (N/A Abdomen)    Relevant Problems   PULMONARY   (+) Smoking      Other   (+) SDH (subdural hematoma) (HCC)        Physical Exam    Airway  Comment: Intubated           Dental       Cardiovascular  Rate: abnormal,     Pulmonary  Pulmonary exam normal     Other Findings        Anesthesia Plan  ASA Score- 6     Anesthesia Type- general with ASA Monitors  Additional Monitors:   Airway Plan: ETT  Plan Factors-    Chart reviewed  Existing labs reviewed  Patient summary reviewed  Patient is a current smoker  Patient did not smoke on day of surgery  Induction- inhalational and intravenous  Postoperative Plan-     Informed Consent- Plan/risks discussed with: Consent obtained from family by [facility]  I personally reviewed this patient with the CRNA  Discussed and agreed on the Anesthesia Plan with the CRNA  Rohit Michaels

## 2021-05-23 NOTE — ANESTHESIA POSTPROCEDURE EVALUATION
Post-Op Assessment Note             Comments: [facility] case, end of life at 0978        No complications documented      BP      Temp      Pulse    Resp      SpO2

## 2021-05-24 NOTE — UTILIZATION REVIEW
Initial Clinical Review    Admission: Date/Time/Statement:   Admission Orders (From admission, onward)     Ordered        05/21/21 1449  Inpatient Admission  Once                   Orders Placed This Encounter   Procedures    Inpatient Admission     Standing Status:   Standing     Number of Occurrences:   1     Order Specific Question:   Level of Care     Answer:   Critical Care [15]     Order Specific Question:   Estimated length of stay     Answer:   More than 2 Midnights     Order Specific Question:   Certification     Answer:   I certify that inpatient services are medically necessary for this patient for a duration of greater than two midnights  See H&P and MD Progress Notes for additional information about the patient's course of treatment  ED Arrival Information     Expected Arrival Acuity Means of Arrival Escorted By Service Admission Type    5/21/2021 5/21/2021 12:21 - Ambulance SLETS DEPARTMENT [facility]) Critical Care/ICU Urgent    Arrival Complaint    Intracranial Hemorrhage        Initial Presentation: 61 y o  male,  with unknown PMH  on dual anti-platelet therapy, who presents, via EMS as a level A transfer, due to a fixed-dilated left pupil, large acute and hyper acute left subdural hematoma with midline shift  Per EMS, patient was walking his dog when he was witnessed to collapse  EMS was called and he was brought to  [facility] ED  Due to his unresponsiveness, he was intubated and was  transferred to [facility]  and hypertonics were administered  He  received DDAVP as reversal   CT head showed large acute left hemispheric subdural hematoma with questionable subarachnoid hemorrhage  Significant mass effect  He is admitted inpatient to [facility]  crtical care for neurosurgical evaluation  Trauma- critical care consult- 5/21 - 60 yo m with hx of CAD s/p sent, on dual antiplatlet therapy   Presents with large acute and hyperacute left subdural hematoma with midline shift, deteriorating GCS, and fixed pupils  Non- survivable brain injury with loss of neuro exam and brainstem reflexes   Level 2 cods while awaiting family contact  GOL to evaluate  Neurosurgery - 5/21 -  Pt with large acute  And hyperacute left subdural hematoma with midline shift, deteriorating GCS  aqnd fixed puplils  He was intubated  And transferred to B  Imaging showed extensive left  Sided holohemisphere subdural hemorrhage with hyperacute features and subarachnoid hemorrhoid   Concern for may be preceding intracranial event with possible possible left-sided MCA aneurysm with then extensive large left-sided subdural  OR was set up for possibel intervention  However on clinical exam and imaging findings indicate potential recovery extremely poor and dismal  Decision was not to proceed with any neurosurgical intervention  Futile in the face of established transtentorial herniaton with fixed dilated pupils  Plan supportive and comfort care measures         Date:5/22/2021   Day 2:  Pt seen serial examinations done  Until 0255 when time of death declared  Gift of Life  Assuming care post time of Death  Brain death pronounced 5/22 @ 02:55  Summary of Hospital Course: Patient was received in the trauma bay already intubated and maintained on mechanical ventilation  He had received DDAVP and mannitol enroute  His pupils were dilated and fixed  He had a CTA and was taken directly to the OR  In the OR, Neurosurgery determined injury to be non-survivable and the case was aborted  Patient was brought up to the ICU  Family was notified after extensive search to find a contact and patient's wife, son and daughter came in to the hospital and were at bedside  Sarbjit Goerge was declared brain dead at 02:55 by Dr Kevon Cerna   G[facility] Life at bedside and proceeding with discussions with the family pertaining to possible organ donation         ED Triage Vitals   Temperature Pulse Respirations Blood Pressure SpO2   05/21/21 1225 05/21/21 1225 05/21/21 1225 05/21/21 1225 05/21/21 1225   (!) 95 7 °F (35 4 °C) 58 16 164/80 98 %      Temp Source Heart Rate Source Patient Position - Orthostatic VS BP Location FiO2 (%)   05/21/21 1225 05/21/21 1257 05/21/21 1225 -- --   Tympanic Monitor Lying        Pain Score       --                 Wt Readings from Last 1 Encounters:   05/21/21 113 kg (250 lb)     Additional Vital Signs:   Date/Time  Temp  Pulse  Resp  BP  MAP (mmHg)  Arterial Line BP  MAP  SpO2  O2 Device  Patient Position - Orthostatic VS   05/23/21 0321  --  --  --  --  --  --  --  93 %  --  --   05/23/21 0300  --  120Abnormal   16  123/78  93  112/60  76 mmHg  93 %  --  --   05/23/21 0200  99 °F (37 2 °C)  122Abnormal   16  136/76  97  122/64  236 mmHg  92 %  --  --   05/23/21 0100  98 6 °F (37 °C)  118Abnormal   16  140/77  96  122/64  82 mmHg  91 %  --  --   05/23/21 0000  99 °F (37 2 °C)  118Abnormal   16  134/73  95  116/62  80 mmHg  92 %  --  --   05/22/21 2300  99 °F (37 2 °C)  120Abnormal   16  140/75  96  124/66  84 mmHg  95 %  --  --   05/22/21 2251  --  --  --  --  --  --  --  92 %  --  --   05/22/21 2200  99 3 °F (37 4 °C)  122Abnormal   16  155/82  105  132/70  90 mmHg  92 %  --  --   05/22/21 2100  100 4 °F (38 °C)  126Abnormal   16  171/83Abnormal   113  142/76  98 mmHg  92 %  --  --   05/22/21 2000  100 °F (37 8 °C)  126Abnormal   15  195/93Abnormal   132  168/84  112 mmHg  93 %  --  --   05/22/21 1908  99 3 °F (37 4 °C)  --  --  --  --  --  --  --  --  --   05/22/21 1903  --  --  --  --  --  --  --  97 %  --  --   05/22/21 1900  99 °F (37 2 °C)  106Abnormal   14  213/107Abnormal   153  214/100  146 mmHg  94 %  --  --   05/22/21 1820  97 9 °F (36 6 °C)  102  13  --  --  178/86  122 mmHg  95 %  --  --   05/22/21 1810  97 5 °F (36 4 °C)  94  13  164/83  117  158/76  106 mmHg  95 %  --  --   05/22/21 1800  97 5 °F (36 4 °C)  122Abnormal   17  --  --  174/84  114 mmHg  94 %  --  --   05/22/21 1750  97 2 °F (36 2 °C)Abnormal   118Abnormal   17  --  -- 160/80  106 mmHg  94 %  --  --   05/22/21 1745  96 8 °F (36 °C)Abnormal   116Abnormal   18  --  --  158/78  106 mmHg  94 %  --  --   05/22/21 1740  88 5 °F (31 4 °C)Abnormal   114Abnormal   17  --  --  154/78  102 mmHg  94 %  --  --   05/22/21 1730  91 4 °F (33 °C)Abnormal   112Abnormal   18  --  --  146/74  100 mmHg  94 %  --  --   05/22/21 1720  96 4 °F (35 8 °C)Abnormal   110Abnormal   18  --  --  146/74  98 mmHg  95 %  --  --   05/22/21 1715  96 4 °F (35 8 °C)Abnormal   108Abnormal   14  185/103Abnormal   134  146/74  100 mmHg  95 %  --  --   05/22/21 1710  96 4 °F (35 8 °C)Abnormal   106Abnormal   15  --  --  160/80  108 mmHg  95 %  --  --   05/22/21 1703  --  --  --  193/96Abnormal   --  --  --  --  --  --   05/22/21 1700  96 4 °F (35 8 °C)Abnormal   104  13  188/105Abnormal   138  190/94  132 mmHg  95 %  --  --   05/22/21 1610  96 8 °F (36 °C)Abnormal   96  13  --  --  162/82  114 mmHg  95 %  --  --   05/22/21 1600  96 8 °F (36 °C)Abnormal   96  13  --  --  154/80  108 mmHg  95 %  --  --   05/22/21 1550  97 2 °F (36 2 °C)Abnormal   96  20  --  --  162/86  116 mmHg  93 %  --  --   05/22/21 1545  97 2 °F (36 2 °C)Abnormal   96  13  --  --  --  106 mmHg  94 %  --  --   05/22/21 1540  96 8 °F (36 °C)Abnormal   98  14  --  --  --  --  94 %  --  --   05/22/21 1536  --  --  --  --  --  --  --  94 %  --  --   05/22/21 1500  --  96  13  139/81  101  136/72  96 mmHg  94 %  --  --   05/22/21 1415  --  94  14  --  --  144/78  106 mmHg  96 %  --  --   05/22/21 1400  --  94  13  197/106Abnormal   145  184/98  134 mmHg  97 %  --  --   05/22/21 1345  --  92  13  --  --  156/84  112 mmHg  95 %  --  --   05/22/21 1330  --  90  14  166/89  124  148/80  108 mmHg  94 %  --  --   05/22/21 1315  --  92  14  --  --  146/78  106 mmHg  94 %  --  --   05/22/21 1300  96 3 °F (35 7 °C)Abnormal   94  14  155/91  112  140/76  102 mmHg  94 %  --  --   05/22/21 1245  --  96  13  --  --  142/76  102 mmHg  94 %  --  --   05/22/21 1230  --  100 13  153/78  109  134/72  96 mmHg  94 %  --  --   05/22/21 1220  --  104  13  156/85  102  134/74  96 mmHg  94 %  --  --   05/22/21 1215  --  104  13  --  --  --  272 mmHg  94 %  --  --   05/22/21 1210  --  104  13  180/103Abnormal   134  --  272 mmHg  94 %  --  --   05/22/21 1200  --  104  13  176/89Abnormal   126  170/90  120 mmHg  94 %  --  --   05/22/21 1150  --  104  13  --  --  164/96  120 mmHg  94 %  --  --   05/22/21 1140  --  102  16  --  --  154/82  110 mmHg  97 %  --  --   05/22/21 1130  --  100  13  201/101Abnormal   136  214/110  152 mmHg  97 %  --  --   05/22/21 1120  --  94  14  --  --  160/86  112 mmHg  95 %  --  --   05/22/21 1110  --  94  14  --  --  156/84  110 mmHg  93 %  --  --   05/22/21 1100  --  94  13  161/93  123  154/84  110 mmHg  94 %  --  --   05/22/21 1045  97 7 °F (36 5 °C)  90  13  --  --  146/86  110 mmHg  96 %  --  --   05/22/21 1030  --  90  14  142/79  104  140/78  100 mmHg  95 %  --  --   05/22/21 1020  --  94  14  --  --  158/84  110 mmHg  94 %  --  --   05/22/21 1015  --  96  13  110/70  83  140/80  100 mmHg  88 %Abnormal   --  --   05/22/21 1010  --  96  13  --  --  122/74  90 mmHg  89 %Abnormal   --  --   05/22/21 1000  --  94  14  136/81  101  126/70  90 mmHg  93 %  --  --   05/22/21 0945  --  96  14  --  --  134/72  92 mmHg  93 %  --  --   05/22/21 0930  --  98  14  136/80  103  132/74  94 mmHg  93 %  --  --   05/22/21 0915  --  100  15  --  --  146/80  104 mmHg  93 %  --  --   05/22/21 0900  98 1 °F (36 7 °C)  98  16  180/91Abnormal   134  162/92  118 mmHg  98 %  --  --   05/22/21 0845  --  94  13  --  --  162/86  116 mmHg  97 %  --  --   05/22/21 0830  --  92  14  163/90  121  138/76  100 mmHg  98 %  --  --   05/22/21 0815  --  90  13  --  --  166/88  118 mmHg  97 %  --  --   05/22/21 0800  98 4 °F (36 9 °C)  90  13  181/94Abnormal   133  168/88  118 mmHg  94 %  --  --   05/22/21 0745  --  88  16  --  --  146/80  104 mmHg  94 %  --  --   05/22/21 0730  --  88  17  154/87 113  152/82  110 mmHg  94 %  --  --   05/22/21 0715  --  86  17  --  --  138/78  100 mmHg  93 %  --  --   05/22/21 0700  98 6 °F (37 °C)  84  15  158/76  112  148/82  108 mmHg  94 %  --  --   05/22/21 0600  98 8 °F (37 1 °C)  90  15  179/87Abnormal   127  172/92  124 mmHg  94 %  --  --   05/22/21 0500  --  84  15  150/82  113  138/76  100 mmHg  95 %  --  --   05/22/21 0400  --  90  15  135/85  104  140/82  106 mmHg  94 %  --  --   05/22/21 0300  --  96  15  81/53Abnormal   64  88/52  66 mmHg  99 %  --  --   05/22/21 0200  --  100  14  80/53Abnormal   68  76/46  58 mmHg  95 %  --  --   05/22/21 0100  --  132Abnormal   14  63/41Abnormal   46  56/36  44 mmHg  94 %  --  --   05/22/21 0000  103 6 °F (39 8 °C)Abnormal   140Abnormal   17  87/55Abnormal   74  102/54  68 mmHg  95 %  --  --   05/21/21 2300  --  116Abnormal   16  133/62  83  144/58  78 mmHg  95 %  --  --   05/21/21 2257  --  --  --  --  --  --  --  95 %  --  --   05/21/21 2245  --  --  --  284/133Abnormal   --  --  --  --  --  --   05/21/21 2203  --  --  --  205/72Abnormal   --  --  --  --  --  --   05/21/21 2200  --  100  26Abnormal   174/66Abnormal   95  198/70  94 mmHg  98 %  --  --   05/21/21 2100  --  70  23Abnormal   164/67  91  218/66  94 mmHg  99 %  --  --   05/21/21 2000  100 8 °F (38 2 °C)Abnormal   60  22  157/68  94  202/56  84 mmHg  99 %  --  --   05/21/21 1945  --  --  --  244/67Abnormal   --  --  --  --  --  --   05/21/21 1923  --  --  --  --  --  --  --  99 %  --  --   05/21/21 1900  100 6 °F (38 1 °C)Abnormal   46Abnormal   19  159/68  104  198/58  90 mmHg  99 %  --  --   05/21/21 1800  100 °F (37 8 °C)  48Abnormal   18  114/59  79  164/50  74 mmHg  100 %  --  --   05/21/21 1730  --  50Abnormal   18  127/49Abnormal   71  158/52  74 mmHg  99 %  --  --   05/21/21 1700  --  50Abnormal   17  120/52  73  156/50  74 mmHg  100 %  --  --   05/21/21 1630  --  46Abnormal   16  136/58  93  174/52  76 mmHg  100 %  --  --   05/21/21 1600  98 5 °F (36 9 °C) 48Abnormal   17  110/51  66  --  --  98 %  --  --   05/21/21 1548  --  --  --  --  --  --  --  99 %  --  --   05/21/21 1500  97 °F (36 1 °C)Abnormal   46Abnormal   16  126/54  74  --  --  99 %  --  --   05/21/21 1430  --  46Abnormal   16  144/64  86  --  --  98 %  --  --   05/21/21 1400  96 6 °F (35 9 °C)Abnormal   48Abnormal   17  152/68  96  --  --  99 %  --  --   05/21/21 1345  --  52Abnormal   13  154/75  101  --  --  100 %  --  --   05/21/21 1305  --  --  --  --  --  --  --  100 %  --   --         Pertinent Labs/Diagnostic Test Results:   Results from last 7 days   Lab Units 05/21/21  1049   SARS-COV-2  Negative     Results from last 7 days   Lab Units 05/22/21  2330 05/22/21  1756 05/22/21  1151 05/22/21  0422 05/22/21 0252 05/21/21  1224   WBC Thousand/uL 20 77* 20 08* 17 87* 21 04*  --    < > 16 19*   HEMOGLOBIN g/dL 16 8 16 7 16 0 15 6  --    < > 16 2   I STAT HEMOGLOBIN g/dl  --   --   --   --  16 7   < >  --    HEMATOCRIT % 51 3* 51 7* 49 7* 47 6  --    < > 50 7*   HEMATOCRIT, ISTAT %  --   --   --   --  49   < >  --    PLATELETS Thousands/uL 238 232 233 275  --    < > 256   NEUTROS ABS Thousands/µL  --  18 48* 14 53*  --   --   --  12 59*    < > = values in this interval not displayed           Results from last 7 days   Lab Units 05/22/21  2330 05/22/21  1756 05/22/21  1605 05/22/21  1151 05/22/21  0828 05/22/21  0422  05/21/21 2010 05/21/21  1638   SODIUM mmol/L 137 138 138 137 141  --   --  143 142   POTASSIUM mmol/L 3 7 3 8 3 9 3 4* 3 3*  --   --  3 8 3 6   CHLORIDE mmol/L 107 109* 109* 105 109*  --   --  109* 107   CO2 mmol/L 24 21 22 26 25  --   --  25 27   CO2, I-STAT   --   --   --   --   --   --    < >  --   --    ANION GAP mmol/L 6 8 7 6 7  --   --  9 8   BUN mg/dL 12 11 12 13 15  --   --  16 15   CREATININE mg/dL 1 12 0 93 0 85 0 86 0 88  --   --  1 06 0 89   EGFR ml/min/1 73sq m 69 86 92 92 91  --   --  74 90   CALCIUM mg/dL 9 3 9 0 8 8 8 8 8 4  --   --  9 0 8 9   CALCIUM, IONIZED mmol/L 1 21  --   --   --  1 12 1 08*  --  1 09* 1 10*   MAGNESIUM mg/dL 2 2  --  2 2  --  2 3  --   --  2 2 2 1   PHOSPHORUS mg/dL 1 2*  --  2 1*  --  2 4  --   --  2 4 3 4    < > = values in this interval not displayed       Results from last 7 days   Lab Units 05/22/21  2330 05/22/21  1756 05/22/21  1151 05/22/21  0828 05/21/21 2010   AST U/L 66* 79* 68* 56* 25   ALT U/L 31 33 29 28 30   ALK PHOS U/L 109 113 111 107 116   TOTAL PROTEIN g/dL 7 1 7 1 6 5 6 4 6 8   ALBUMIN g/dL 3 0* 3 2* 3 1* 3 0* 3 5   TOTAL BILIRUBIN mg/dL 0 74 1 41* 1 33* 1 28* 0 92   BILIRUBIN DIRECT mg/dL  --  0 57*  --  0 40* 0 30*     Results from last 7 days   Lab Units 05/22/21  1754 05/22/21  1242 05/22/21  0118 05/21/21  1806   POC GLUCOSE mg/dl 149* 115 107 100     Results from last 7 days   Lab Units 05/22/21  2330 05/22/21  1756 05/22/21  1605 05/22/21  1151 05/22/21  0828 05/21/21 2010 05/21/21  1638 05/21/21  1224 05/21/21  1049   GLUCOSE RANDOM mg/dL 198* 165* 148* 135 119 119 115 114 112         Results from last 7 days   Lab Units 05/22/21  1151   HEMOGLOBIN A1C % 5 8*   EAG mg/dl 120     Results from last 7 days   Lab Units 05/22/21  2332 05/22/21  1928 05/22/21  1756   PH ART  7 345* 7 296* 7 362   PCO2 ART mm Hg 41 2 46 4* 38 7   PO2 ART mm Hg 244 1* 287 7* 82 3   HCO3 ART mmol/L 22 0 22 1 21 5*   BASE EXC ART mmol/L -3 5 -4 6 -3 5   O2 CONTENT ART mL/dL 24 3* 26 4* 23 3*   O2 HGB, ARTERIAL % 98 4* 98 6* 95 3   ABG SOURCE  Line, Arterial Line, Arterial Line, Arterial         Results from last 7 days   Lab Units 05/22/21  0252 05/21/21  1233 05/21/21  1044   PH, PEG I-STAT   --  7 362 7 369   PCO2, PEG ISTAT mm HG  --  44 6 49 4   PO2, PEG ISTAT mm HG  --  44 0 225 0*   HCO3, PEG ISTAT mmol/L  --  25 4 28 5   I STAT BASE EXC mmol/L 2 0 2   I STAT O2 SAT % 89* 78 100*   ISTAT PH ART  7 255*  --   --    I STAT ART PCO2 mm HG 71 1*  --   --    I STAT ART PO2 mm HG 67 0*  --   --    I STAT ART HCO3 mmol/L 31 6*  --   --      Results from last 7 days   Lab Units 05/22/21  2330 05/22/21  1756 05/22/21  1151   CK TOTAL U/L 377* 557* 643*   CK MB INDEX % 9 9* 8 7* 6 4*   CK MB ng/mL 37 5* 48 4* 41 4*     Results from last 7 days   Lab Units 05/22/21  2330 05/22/21  1756 05/22/21  1605 05/22/21  1151 05/22/21  0828 05/22/21  0755 05/21/21  1049   TROPONIN I ng/mL 4 71* 6 68* 6 82* 6 85* 6 84* 2 86* <0 02         Results from last 7 days   Lab Units 05/22/21  1756 05/22/21  0828 05/21/21  2010   PROTIME seconds 14 7* 14 8* 13 1   INR  1 15 1 16 0 99   PTT seconds 46* 36 29     Results from last 7 days   Lab Units 05/22/21  0828   TSH 3RD GENERATON uIU/mL 2 040     Results from last 7 days   Lab Units 05/22/21  2330 05/22/21  1756 05/22/21  1151   LIPASE u/L 30* 28* 31*   AMYLASE IU/L 28 29 28       Results from last 7 days   Lab Units 05/22/21  2348 05/22/21  0924 05/21/21  1901   CLARITY UA  Clear Cloudy Turbid   COLOR UA  Dk Yellow Yellow Red   SPEC GRAV UA  1 022 1 016 >1 045*   PH UA  5 5 8 0 Interference-unable to analyze*   GLUCOSE UA mg/dl 100 (1/10%)* Negative Interference- unable to analyze   KETONES UA mg/dl Trace* Trace* Interference- unable to analyze*   BLOOD UA  Large* Large* Interference- unable to analyze*   PROTEIN UA mg/dl 100 (2+)* Trace* Interference- unable to analyze*   NITRITE UA  Negative Negative Interference- unable to analyze   BILIRUBIN UA  Negative Negative Interference- unable to analyze*   UROBILINOGEN UA E U /dl 1 0 0 2 Interference-unable to analyze   LEUKOCYTES UA  Negative Trace* Interference- unable to analyze*   WBC UA /hpf 2-4 4-10* Field obscured, unable to enumerate*   RBC UA /hpf Innumerable* Innumerable* Innumerable*   BACTERIA UA /hpf None Seen None Seen Field obscured, unable to enumerate*   EPITHELIAL CELLS WET PREP /hpf None Seen None Seen Field obscured, unable to enumerate*       Results from last 7 days   Lab Units 05/22/21  1143   GRAM STAIN RESULT  1+ Polys*  2+ Gram positive cocci in pairs*     CT stroke alert brain   (05/21 1130)   Large acute left hemispheric subdural hematoma with questionable subarachnoid hemorrhage  Significant mass effect with subfalcine and uncal herniation  Dilated temporal horn and atria of the right lateral ventricle  Ct Chest - 5/22 - 1  Bilateral predominantly lower lobe posterior airspace disease right greater than left possibly representative of aspiration pneumonia  Mild patchy posterior airspace disease seen in the right upper lobe  2  Trace left effusion  3  No pneumothorax  4  Coronary artery calcification  CXR 5/22 - Tubes and lines as noted  Medial right base small infiltrate and or slight atelectasis  CTA Head &  neck - 5/21 - 1   Grossly unchanged large left hemispheric subdural hematoma with significant left-to-right midline shift and uncal herniation   Grossly stable enlarged right atrium and temporal horn  2   Suggested subarachnoid hemorrhage in the left greater than right sylvian fissures and suprasellar cistern, similar to prior exam   New blood within 4th ventricle and right quadrigeminal cistern  3   Inferior left frontal lobe hypodensity suggesting developing ischemia  4   Medial displacement of peripheral left MCA vasculature   Elongated contrast enhancement along the margin of subdural hematoma and cortex in the left frontal lobe may indicate active bleed versus venous blush   Underlying vascular malformation is not   excluded   No obvious aneurysm  5   Right-sided deviation of patent anterior cerebral arteries  6   No hemodynamically significant stenosis of cervical carotid and vertebral arteries  7   Pansinusitis  CXR 5/21 - no acute  Ct Brain - 5/21 - Large acute left hemispheric subdural hematoma with questionable subarachnoid hemorrhage  Significant mass effect with subfalcine and uncal herniation  Dilated temporal horn and atria of the right lateral ventricle         ECG  5/21 -   Previous ECG:  Unavailable    Interpretation: normal       ECG rate:  83    ECG rate assessment: normal      Rhythm: sinus rhythm      Ectopy: none      QRS axis:  Normal    QRS intervals:  Normal    Conduction: normal      ST segments:  Non-specific    Elevation:  III    T waves: normal    Comments: qtc 434       ED Treatment:   Medication Administration from 05/21/2021 1155 to 05/21/2021 1254       Date/Time Order Dose Route Action Comments     05/21/2021 1240 levETIRAcetam (KEPPRA) 1,000 mg in sodium chloride 0 9 % 100 mL IVPB 1,000 mg Intravenous New Bag      05/21/2021 1227 propofol (DIPRIVAN) 200 MG/20ML bolus injection 5 mcg/kg/min Intravenous New Bag      05/21/2021 1230 fentanyl citrate (PF) 100 MCG/2ML 100 mcg Intravenous Given      05/21/2021 1248 iohexol (OMNIPAQUE) 350 MG/ML injection (MULTI-DOSE) 100 mL 100 mL Intravenous Given         No past medical history on file  Present on Admission:   SDH (subdural hematoma) (HCC)      Admitting Diagnosis: Intracranial hemorrhage (HCC) [I62 9]  Age/Sex: 61 y o  male     Admission Orders:  Scheduled Medications:   Ancef 1000 mg iv once 5/22  Fortaz 1000 mg iv q12 - 5/22x 2   pepcid 20 mg iv q12  Hydralazine 10 mg iv once - 5/21 x 2   Humalog  Ss coverage - 5/23 x 1   Keppra 1000 mg iv once 5/21 x 1  Isolyte 1 L Bolus - 5/22x 2   NSS 500ml Bolus 5/22 x 1      levothyroxine 200 mcg in sodium chloride 0 9 % 500 mL IVPB   Rate: 100 mL/hr Dose: 40 mcg/hr  Freq: Titrated Route: IV  Last Dose: Stopped (05/22/21 1831)  Start: 05/22/21 0200 End: 05/23/21 1129        multi-electrolyte (PLASMALYTE-A/ISOLYTE-S PH 7 4) IV solution   Rate: 75 mL/hr Dose: 75 mL/hr  Freq: Continuous Route: IV  Last Dose: Stopped (05/22/21 0820)  Start: 05/21/21 1500 End: 05/22/21 0652  niCARdipine (CARDENE) 25 mg (STANDARD CONCENTRATION) in sodium chloride 0 9% 250 mL   Rate:  mL/hr Dose: 1-15 mg/hr  Freq: Titrated Route: IV  Last Dose: Stopped (05/23/21 0311)  Start: 05/22/21 1945 End: 05/23/21 1129  norepinephrine (LEVOPHED) 4 mg (STANDARD CONCENTRATION) IV in sodium chloride 0 9% 250 mL   Rate: 3 8-112 5 mL/hr Dose: 1-30 mcg/min  Freq: Titrated Route: IV  Last Dose: Stopped (05/22/21 1223)  Start: 05/21/21 1530 End: 05/23/21 1129  phenylephrine (MARCELL-SYNEPHRINE) 50 mg (STANDARD CONCENTRATION) in sodium chloride 0 9% 250 mL   Rate: 7 5-54 mL/hr Dose:  mcg/min  Freq: Titrated Route: IV  Last Dose: Stopped (05/22/21 0801)  Start: 05/22/21 0100 End: 05/23/21 1129  sodium chloride infusion 0 45 %   Rate: 100 mL/hr Dose: 100 mL/hr  Freq: Continuous Route: IV  Indications of Use: IV Hydration  Last Dose: 100 mL/hr (05/22/21 2045)  Start: 05/22/21 0700 End: 05/23/21 1129  vasopressin (PITRESSIN) 20 Units in sodium chloride 0 9 % 100 mL infusion   Rate: 12 mL/hr Dose: 0 04 Units/min  Freq: Continuous Route: IV  Last Dose: Stopped (05/22/21 1405)  Start: 05/22/21 0130 End: 05/23/21 1129    Tylenol  Rectal Supp - 5/21 x 1 - 5/22 x 1   Fentanyl 100 mcg 5/21 x 1   Hydralazine 10 mg iv prn 5/21 x 1 - 5/22 x 1           Network Utilization Review Department  ATTENTION: Please call with any questions or concerns to [phone] and carefully listen to the prompts so that you are directed to the right person  All voicemails are confidential   Farrel Bodily all requests for admission clinical reviews, approved or denied determinations and any other requests to dedicated fax number below belonging to the campus where the patient is receiving treatment   List of dedicated fax numbers for the Facilities:  [address] DENIALS (Administrative/Medical Necessity) [phone]   [address] (Maternity/NICU/Pediatrics) 261 [facility] [place] [phone] [facility] [phone] [phone]

## 2021-05-25 LAB
BACTERIA BRONCH AEROBE CULT: ABNORMAL
GRAM STN SPEC: ABNORMAL
GRAM STN SPEC: ABNORMAL

## 2021-05-25 NOTE — UTILIZATION REVIEW
Notification of Discharge   This is a Notification of Discharge from our facility [address]  Please be advised that this patient has been discharge from our facility  Below you will find the admission and discharge date and time including the patients disposition  UTILIZATION REVIEW CONTACT:  Jose Ramon Cyr  Utilization   Network Utilization Review Department  Phone: [phone] x carefully listen to the prompts  All voicemails are confidential   Email: [email or web address]     PHYSICIAN ADVISORY SERVICES:  FOR [facility]IL[facility] REVIEW - MEDICAL NECESSITY DENIAL  Phone: [phone]  Fax: [phone]  Email: [email or web address]     PRESENTATION DATE: 2021 12:21 PM  OBERVATION ADMISSION DATE:   INPATIENT ADMISSION DATE: 21 1331   DISCHARGE DATE: 2021  8:44 AM  DISPOSITION:        IMPORTANT INFORMATION:  Send all requests for admission clinical reviews, approved or denied determinations and any other requests to dedicated fax number below belonging to the campus where the patient is receiving treatment   List of dedicated fax numbers:  [address] DENIALS (Administrative/Medical Necessity) [phone]   [address]  (Maternity/NICU/Pediatrics) [phone]   Blanca Ferguson [phone]   [facility] [phone]   [facility]  [phone]   Eduardoshiralaureronen Patel [facility] [address] [place] [address] [phone]   [facility]  [phone]   [address] [facility]  [address] [facility] [address] [facility] [phone]

## (undated) DEVICE — POOLE SUCTION HANDLE: Brand: CARDINAL HEALTH

## (undated) DEVICE — DRAPE SHEET X-LG

## (undated) DEVICE — TRU-CUT NEEDLE BIOPSY SOFT TISSUE 14G X 15CM: Brand: TRU-CUT

## (undated) DEVICE — BETHLEHEM UNIVERSAL SPINE, KIT: Brand: CARDINAL HEALTH

## (undated) DEVICE — MEDI-VAC YANK SUCT HNDL W/TPRD BULBOUS TIP: Brand: CARDINAL HEALTH

## (undated) DEVICE — TUBING SUCTION 5MM X 12 FT

## (undated) DEVICE — UMBILICAL TAPE: Brand: DEROYAL

## (undated) DEVICE — SUT PROLENE 4-0 RB-1/RB-1 36 IN 8557H

## (undated) DEVICE — SHROUD KIT ADULT DISP

## (undated) DEVICE — INSTRUMENT 874-445 ML 9X11MMDP CTTR 5MM: Brand: MEDTRONIC REUSABLE INSTRUMENT

## (undated) DEVICE — ROSEBUD DISSECTORS: Brand: DEROYAL

## (undated) DEVICE — SPONGE PVP SCRUB WING STERILE

## (undated) DEVICE — PENCIL ELECTROSURG E-Z CLEAN -0035H

## (undated) DEVICE — HEAVY DUTY TABLE COVER: Brand: CONVERTORS

## (undated) DEVICE — ELECTRODE BLADE MOD E-Z CLEAN 2.5IN 6.4CM -0012M

## (undated) DEVICE — PROXIMATE RELOADABLE LINEAR CUTTER WITH SAFETY LOCK-OUT, 75MM: Brand: PROXIMATE

## (undated) DEVICE — PLASTIC ADHESIVE BANDAGE: Brand: CURITY

## (undated) DEVICE — SAW BLADE STERNUM EXTENDED 531

## (undated) DEVICE — SUT SILK 2-0 18 IN A185H

## (undated) DEVICE — SUT ETHIBOND 2 LR/LR 30 IN X496T

## (undated) DEVICE — SWABSTCK, BENZOIN TINCTURE, 1/PK, STRL: Brand: APLICARE

## (undated) DEVICE — BONE WAX WHITE: Brand: BONE WAX WHITE

## (undated) DEVICE — TOWEL SET X-RAY

## (undated) DEVICE — 3M™ IOBAN™ 2 ANTIMICROBIAL INCISE DRAPE 6651EZ: Brand: IOBAN™ 2

## (undated) DEVICE — INTENDED FOR TISSUE SEPARATION, AND OTHER PROCEDURES THAT REQUIRE A SHARP SURGICAL BLADE TO PUNCTURE OR CUT.: Brand: BARD-PARKER SAFETY BLADES SIZE 15, STERILE

## (undated) DEVICE — INTENDED FOR TISSUE SEPARATION, AND OTHER PROCEDURES THAT REQUIRE A SHARP SURGICAL BLADE TO PUNCTURE OR CUT.: Brand: BARD-PARKER SAFETY BLADES SIZE 10, STERILE

## (undated) DEVICE — SUT VICRYL PLUS 3-0 RB-1 CR/8 18 IN VCP713D

## (undated) DEVICE — CHLORAPREP HI-LITE 26ML ORANGE

## (undated) DEVICE — BETHLEHEM MAJOR GENERAL PACK: Brand: CARDINAL HEALTH

## (undated) DEVICE — SNAP KOVER: Brand: UNBRANDED

## (undated) DEVICE — GLOVE INDICATOR PI UNDERGLOVE SZ 8.5 BLUE

## (undated) DEVICE — PROXIMATE LINEAR CUTTER RELOAD, BLUE, 75MM: Brand: PROXIMATE

## (undated) DEVICE — TIBURON SPLIT SHEET: Brand: CONVERTORS

## (undated) DEVICE — PROXIMATE PLUS MD MULTI-DIRECTIONAL RELEASE SKIN STAPLERS CONTAINS 35 STAINLESS STEEL STAPLES APPROXIMATE CLOSED DIMENSIONS: 6.9MM X 3.9MM WIDE: Brand: PROXIMATE

## (undated) DEVICE — GLOVE SRG BIOGEL 8

## (undated) DEVICE — SUT SILK 0 30 IN A306H

## (undated) DEVICE — PREP SURGICAL PURPREP 26ML

## (undated) DEVICE — TOOL 14MH30 LEGEND 14CM 3MM: Brand: MIDAS REX ™

## (undated) DEVICE — ABDOMINAL PAD: Brand: DERMACEA

## (undated) DEVICE — HEMOSTATIC MATRIX SURGIFLO 8ML W/THROMBIN

## (undated) DEVICE — DRAPE MICROSCOPE OPMI PENTERO

## (undated) DEVICE — INTENDED FOR TISSUE SEPARATION, AND OTHER PROCEDURES THAT REQUIRE A SHARP SURGICAL BLADE TO PUNCTURE OR CUT.: Brand: BARD-PARKER ® CARBON RIB-BACK BLADES

## (undated) DEVICE — LIGHT HANDLE COVER SLEEVE DISP BLUE STELLAR

## (undated) DEVICE — DRILL BIT 7080510 11 MM DRILL BIT S